# Patient Record
Sex: FEMALE | Race: WHITE | NOT HISPANIC OR LATINO | Employment: FULL TIME | URBAN - METROPOLITAN AREA
[De-identification: names, ages, dates, MRNs, and addresses within clinical notes are randomized per-mention and may not be internally consistent; named-entity substitution may affect disease eponyms.]

---

## 2017-07-05 ENCOUNTER — ALLSCRIPTS OFFICE VISIT (OUTPATIENT)
Dept: OTHER | Facility: OTHER | Age: 27
End: 2017-07-05

## 2017-07-07 LAB — PAP (HISTORICAL): NORMAL

## 2017-11-14 ENCOUNTER — ALLSCRIPTS OFFICE VISIT (OUTPATIENT)
Dept: OTHER | Facility: OTHER | Age: 27
End: 2017-11-14

## 2017-11-14 DIAGNOSIS — N92.6 IRREGULAR MENSTRUATION: ICD-10-CM

## 2017-11-21 ENCOUNTER — LAB CONVERSION - ENCOUNTER (OUTPATIENT)
Dept: OTHER | Facility: OTHER | Age: 27
End: 2017-11-21

## 2017-11-21 LAB
ESTRADIOL LEVEL (HISTORICAL): 93 PG/ML
PROGESTERONE LEVEL (HISTORICAL): 7.9 NG/ML

## 2017-11-21 NOTE — PROGRESS NOTES
Assessment  1  Irregular menses (626 4) (N92 6)    Plan  Irregular menses    · (1) ANTI-MULLERIAN HORMONE; Status:Active; Requested DOS:[id number];    Perform:[facility]; SZR:[id number]; Ordered; For:Irregular menses; Ordered By:Missy Townsend;   · (1) CBC/PLT/DIFF; Status:Active; Requested OBO:[id number];    Perform:[facility]; SFC:[id number]; Ordered; For:Irregular menses; Ordered By:Missy Townsend;   · (1) ESTRADIOL; Status:Active; Requested NIX:[id number];    Perform:[facility]; BUA:[id number]; Ordered; For:Irregular menses; Ordered By:Missy Townsend;   · (1) ESTROGEN; Status:Active; Requested MFE:[id number];    Perform:[facility]; IBI:[id number]; Ordered; For:Irregular menses; Ordered By:Missy Townsend;   · (1) [address]; Status:Active; Requested KPE:[id number];    Perform:[facility]; MRL:[id number]; Ordered; For:Irregular menses; Ordered By:Missy Townsend;   · (1) LH (LEUTINIZING HORMONE); Status:Active; Requested CIK:[id number];    Perform:[facility]; YHC:[id number]; Ordered; For:Irregular menses; Ordered By:Missy Townsend;   · (1) PROGESTERONE; Status:Active; Requested GQP:[id number];    Perform:[facility]; DQC:[id number]; Ordered; For:Irregular menses; Ordered By:Missy Townsend;   · (1) PROGESTERONE; Status:Active; Requested CDR:[id number];    Perform:[facility]; NPF:[id number]; Ordered; For:Irregular menses; Ordered By:Missy Townsend;   · (1) PROLACTIN; Status:Active; Requested VIX:[id number];    Perform:[facility]; TPB:[id number]; Ordered; For:Irregular menses; Ordered By:Missy Townsend;   · (1) T4, FREE; Status:Active; Requested FSR:[id number];    Perform:[facility]; IRH:[id number]; Ordered; For:Irregular menses; Ordered By:Missy Townsend;   · (1) TSH; Status:Active; Requested BOK:[id number];    Perform:[facility]; [id number]; Ordered;menses; Ordered By:Missy Townsend;     Discussion/Summary  Discussion Summary:   Pt will plan day 21 and day 3 labs to eval due to some flucuations in her cycle  If still no pregnancy by Abdias Dawn of next year will then plan [facility] PNV  Counseling Documentation With Imm: total time of encounter was 20 minutes-- and-- 100 minutes was spent counseling  GYN Discussion and Summary:                    Infertility--  Goals and Barriers: The patient has the current Goals: Pregnancy  The patent has the current Barriers: None  Patient's Capacity to Self-Care: Patient is able to Self-Care  Chief Complaint  Chief Complaint Free Text Note Form: Pt for discussion of fertility      History of Present Illness  HPI: Pt for discussion of pregnancy  Has been trying for pregnancy since  and has not yet been successful  Since birth of child has not been using any BC at all  Periods have been pretty normal and in the last few months seem slightly more irregular, coming closer to q 30-31 days as opposed to q 28 days  Bleeds x 7 days  Having IC regularly using alla to track cycles  Does seem to have signs of ovulation w thicker mucous  Has IC every other day from day 7 through second week have pregnancy w  w last delivery  2 mths to conceive w last pregnancy  Review of Systems   Female ROS: no pelvic pain,-- no pelvic pressure,-- no dysmenorrhea,-- no menorrhagia,-- periods are regular,-- regular length of periods,-- no dysuria,-- no bladder pain,-- urine not cloudy-- and-- no urinary loss of control  Focused-Female:  Constitutional: no fever-- and-- no chills  Cardiovascular: no chest pain-- and-- no palpitations  Respiratory: no shortness of breath  Breasts: no breast pain-- and-- no breast lump  Gastrointestinal: no abdominal pain-- and-- no constipation  Genitourinary: no dysuria  Integumentary: no rashes  Active Problems  1  Obesity (278 00) (E66 9)   2  Visit for routine gyn exam () (Z01 419)    Past Medical History    1   History of 1st trimester screening (V28 89) (Z36 9)   2  History of Abnormal placenta, antepartum (656 73) (O43 109)   3  History of Encounter for fetal anatomic survey (V28 81) (Z36 89)   4  History of Encounter for screening for risk of pre-term labor (V28 82) (Z36 86)   5  History of depression (V11 8) (Z86 59)   6  History of Maternal morbid obesity, antepartum (649 13,278 01) (O99 210,E66 01)   7  History of Obesity complicating pregnancy, childbirth, or puerperium, antepartum (649 13,278 00) (O99 210,E66 9)    Surgical History  1  History of  Section    Family History  Mother    1  No pertinent family history  Father    2  No pertinent family history    Social History   · Feels safe at home   ·    · Never a smoker   · No illicit drug use   · Social alcohol use (Z78 9)    Current Meds   1  No Reported Medications Recorded    Allergies  1  Doxycycline Hyclate CAPS  2  No Known Environmental Allergies   3  No Known Food Allergies    Vitals  Vital Signs    Recorded: [id number] 46:19KK   Systolic 417   Diastolic 80   BP CUFF SIZE Large   Height 6 ft    Weight 290 lb    BMI Calculated 39 33   BSA Calculated 2 49       Physical Exam   Constitutional  General appearance: No acute distress, well appearing and well nourished  Skin  Palpation of skin and subcutaneous tissue: Normal    Psychiatric  Orientation to person, place, and time: Normal    Mood and affect: Normal        Attending Note  Collaborating Physician Note: Collaborating Physician: I discussed the case with the Advanced Practitioner and reviewed the note,-- I supervised the Advanced Practitioner-- and-- I agree with the Advanced Practitioner note        Signatures   Electronically signed by : Anthony Nichols, [facility]; 2017  2:01PM EST                       (Author)    Electronically signed by : ERUM Garcia ; 2017  7:00PM EST                       (Author)

## 2017-11-29 ENCOUNTER — LAB CONVERSION - ENCOUNTER (OUTPATIENT)
Dept: OTHER | Facility: OTHER | Age: 27
End: 2017-11-29

## 2017-11-29 LAB
ESTRADIOL LEVEL (HISTORICAL): 39 PG/ML
LUTEINIZING HORMONE (HISTORICAL): 4.6 MIU/ML

## 2017-12-01 ENCOUNTER — LAB CONVERSION - ENCOUNTER (OUTPATIENT)
Dept: OTHER | Facility: OTHER | Age: 27
End: 2017-12-01

## 2017-12-01 LAB
ANTI-MULLERIAN HORMONE (AMH) (HISTORICAL): 0.48 NG/ML
BASOPHILS # BLD AUTO: 0.5 %
BASOPHILS # BLD AUTO: 42 CELLS/UL (ref 0–200)
DEPRECATED RDW RBC AUTO: 12.7 % (ref 11–15)
EOSINOPHIL # BLD AUTO: 183 CELLS/UL (ref 15–500)
EOSINOPHIL # BLD AUTO: 2.2 %
ESTRADIOL LEVEL (HISTORICAL): 39 PG/ML
ESTROGEN LEVEL (HISTORICAL): 179.2 PG/ML
FSH (HISTORICAL): 9.7 MIU/ML
HCT VFR BLD AUTO: 37.8 % (ref 35–45)
HGB BLD-MCNC: 12.5 G/DL (ref 11.7–15.5)
LUTEINIZING HORMONE (HISTORICAL): 4.6 MIU/ML
LYMPHOCYTES # BLD AUTO: 2473 CELLS/UL (ref 850–3900)
LYMPHOCYTES # BLD AUTO: 29.8 %
MCH RBC QN AUTO: 28.6 PG (ref 27–33)
MCHC RBC AUTO-ENTMCNC: 33.1 G/DL (ref 32–36)
MCV RBC AUTO: 86.5 FL (ref 80–100)
MONOCYTES # BLD AUTO: 523 CELLS/UL (ref 200–950)
MONOCYTES (HISTORICAL): 6.3 %
NEUTROPHILS # BLD AUTO: 5080 CELLS/UL (ref 1500–7800)
NEUTROPHILS # BLD AUTO: 61.2 %
PLATELET # BLD AUTO: 311 THOUSAND/UL (ref 140–400)
PMV BLD AUTO: 11.6 FL (ref 7.5–12.5)
RBC # BLD AUTO: 4.37 MILLION/UL (ref 3.8–5.1)
WBC # BLD AUTO: 8.3 THOUSAND/UL (ref 3.8–10.8)

## 2018-01-12 VITALS
SYSTOLIC BLOOD PRESSURE: 118 MMHG | DIASTOLIC BLOOD PRESSURE: 80 MMHG | HEIGHT: 72 IN | WEIGHT: 290 LBS | BODY MASS INDEX: 39.28 KG/M2

## 2018-01-14 VITALS
SYSTOLIC BLOOD PRESSURE: 130 MMHG | DIASTOLIC BLOOD PRESSURE: 72 MMHG | BODY MASS INDEX: 39.55 KG/M2 | HEIGHT: 72 IN | WEIGHT: 292 LBS

## 2018-03-21 ENCOUNTER — TELEPHONE (OUTPATIENT)
Dept: OBGYN CLINIC | Facility: CLINIC | Age: 28
End: 2018-03-21

## 2018-04-09 ENCOUNTER — TELEPHONE (OUTPATIENT)
Dept: OBGYN CLINIC | Facility: CLINIC | Age: 28
End: 2018-04-09

## 2018-12-11 ENCOUNTER — ANNUAL EXAM (OUTPATIENT)
Dept: OBGYN CLINIC | Facility: CLINIC | Age: 28
End: 2018-12-11
Payer: COMMERCIAL

## 2018-12-11 VITALS
DIASTOLIC BLOOD PRESSURE: 80 MMHG | WEIGHT: 276 LBS | BODY MASS INDEX: 37.38 KG/M2 | SYSTOLIC BLOOD PRESSURE: 130 MMHG | HEIGHT: 72 IN

## 2018-12-11 DIAGNOSIS — Z01.419 ENCOUNTER FOR ANNUAL ROUTINE GYNECOLOGICAL EXAMINATION: Primary | ICD-10-CM

## 2018-12-11 PROCEDURE — 99395 PREV VISIT EST AGE 18-39: CPT | Performed by: OBSTETRICS & GYNECOLOGY

## 2018-12-11 NOTE — PROGRESS NOTES
Assessment/Plan   Diagnoses and all orders for this visit:    Encounter for annual routine gynecological examination   No pap done since normal pap last year    Discussion    Reviewed with patient normal exam today  Reviewed monthly SBEs  Encourage safe sexual practices; STD testing done today  Contraception  - pt attempting pregnancy - discussed starting PNV  All questions have been answered to her satisfaction  RTO for annul or sooner if needed    Subjective     Patient is here for her annual exam   She is doing well without complaints  She is having Q monthly menses for 28 days lasting 7 days with normal flow  Her last Pap smear was in 2017 which was normal   She is currently attempting pregnancy and not using anything for birth control  She is in a monogamous relationship and declines any STD testing  Review of Systems   Constitutional: Negative for activity change and appetite change  Gastrointestinal: Negative for abdominal pain  Genitourinary: Negative for pelvic pain  All other systems reviewed and are negative  OB History      Para Term  AB Living    1 1            SAB TAB Ectopic Multiple Live Births                       Past Medical History:   Diagnosis Date    Abnormal placenta, antepartum     resolved 17    Depression     Maternal morbid obesity, antepartum ([address] )     resolved 22 obeity complicating pregnancy, child birth or puerperium, antepartum       Past Surgical History:   Procedure Laterality Date     SECTION  2015       Family History   Problem Relation Age of Onset    No Known Problems Mother     No Known Problems Father        Social History     Social History    Marital status: /Civil Union     Spouse name: N/A    Number of children: N/A    Years of education: N/A     Occupational History    Not on file       Social History Main Topics    Smoking status: Never Smoker    Smokeless tobacco: Never Used    Alcohol use Yes      Comment: social    Drug use: No    Sexual activity: Not on file     Other Topics Concern    Not on file     Social History Narrative    Feels safe at home           No current outpatient prescriptions on file  Allergies   Allergen Reactions    Doxycycline        Objective   Vitals:    12/11/18 1604   BP: 130/80   Weight: 125 kg (276 lb)   Height: 6' (1 829 m)     Physical Exam   Constitutional: She is oriented to person, place, and time  She appears well-developed and well-nourished  HENT:   Head: Normocephalic and atraumatic  Neck: Normal range of motion  Neck supple  Cardiovascular: Normal rate, regular rhythm and normal heart sounds  No murmur heard  Pulmonary/Chest: Effort normal and breath sounds normal  No respiratory distress  She has no wheezes  Right breast exhibits no inverted nipple, no mass, no nipple discharge, no skin change and no tenderness  Left breast exhibits no inverted nipple, no mass, no nipple discharge, no skin change and no tenderness  Breasts are symmetrical    Abdominal: Soft  Bowel sounds are normal  She exhibits no distension  There is no tenderness  There is no rebound and no guarding  Genitourinary: Vagina normal  There is no rash on the right labia  There is no rash on the left labia  Uterus is not enlarged and not tender  Cervix exhibits no motion tenderness and no discharge  Right adnexum displays no mass, no tenderness and no fullness  Left adnexum displays no mass, no tenderness and no fullness  No vaginal discharge found  Musculoskeletal: Normal range of motion  Neurological: She is alert and oriented to person, place, and time  She has normal reflexes  Skin: Skin is warm and dry  Psychiatric: She has a normal mood and affect   Her behavior is normal  Judgment and thought content normal

## 2018-12-15 LAB — THIN PREP CVX: NORMAL

## 2019-01-12 ENCOUNTER — OFFICE VISIT (OUTPATIENT)
Dept: OBGYN CLINIC | Facility: CLINIC | Age: 29
End: 2019-01-12

## 2019-01-12 VITALS
BODY MASS INDEX: 36.84 KG/M2 | SYSTOLIC BLOOD PRESSURE: 122 MMHG | DIASTOLIC BLOOD PRESSURE: 80 MMHG | HEIGHT: 72 IN | WEIGHT: 272 LBS

## 2019-01-12 DIAGNOSIS — R87.615 ENCOUNTER FOR REPEAT PAPANICOLAOU SMEAR OF CERVIX DUE TO PREVIOUS UNSATISFACTORY RESULTS: Primary | ICD-10-CM

## 2019-01-12 PROCEDURE — REPAP: Performed by: PHYSICIAN ASSISTANT

## 2019-01-12 RX ORDER — ASCORBIC ACID, CHOLECALCIFEROL, .ALPHA.-TOCOPHEROL ACETATE, DL-, PYRIDOXINE, FOLIC ACID, CYANOCOBALAMIN, CALCIUM, FERROUS FUMARATE, MAGNESIUM, DOCONEXENT 85; 200; 10; 25; 1; 12; 140; 27; 45; 300 [IU]/1; [IU]/1; [IU]/1; [IU]/1; MG/1; UG/1; MG/1; MG/1; MG/1; MG/1
CAPSULE, GELATIN COATED ORAL
COMMUNITY

## 2019-01-12 NOTE — PROGRESS NOTES
Assessment/Plan   Diagnoses and all orders for this visit:    Encounter for repeat Papanicolaou smear of cervix due to previous unsatisfactory results  -     GP PAP (RFLX HPV PLUS WHEN ASCUS)    Discussion  RTO for APE in 1 year or sooner if needed    Subjective     HPI   Jaxson Ziegler is a 33 y o  female who presents for a repap no charge  Patient had an APE with 18 - at that time it was documented between the patient and provider that a pap was not indicated, however, a pap results came back unsatisfactory since it was likely not done at APE  When called to review, patient desired pap to be done due to history of HRHPV so appointment scheduled  LMP - ; Periods are reg q 28 days and last 7 days; Last Pap - 17 - WNL  History of abnormal Pap smear: yes h/o HRHPV    Review of Systems    The following portions of the patient's history were reviewed and updated as appropriate: allergies, current medications, past family history, past medical history, past social history, past surgical history and problem list          OB History      Para Term  AB Living    1 1            SAB TAB Ectopic Multiple Live Births                       Past Medical History:   Diagnosis Date    Abnormal placenta, antepartum     resolved 17    Depression     Maternal morbid obesity, antepartum ([address] )     resolved  obeity complicating pregnancy, child birth or puerperium, antepartum       Past Surgical History:   Procedure Laterality Date     SECTION  2015       Family History   Problem Relation Age of Onset    No Known Problems Mother     No Known Problems Father        Social History     Social History    Marital status: /Civil Union     Spouse name: N/A    Number of children: N/A    Years of education: N/A     Occupational History    Not on file       Social History Main Topics    Smoking status: Never Smoker    Smokeless tobacco: Never Used    Alcohol use Yes Comment: social    Drug use: No    Sexual activity: Not on file     Other Topics Concern    Not on file     Social History Narrative    Feels safe at home             Current Outpatient Prescriptions:     Prenat w/o Y-UT-Xssvlis-FA-DHA (PNV-DHA) 27-0 6-0 4-300 MG CAPS, Take by mouth, Disp: , Rfl:     Allergies   Allergen Reactions    Doxycycline        Objective   Vitals:    01/12/19 1119   BP: 122/80   BP Location: Left arm   Patient Position: Sitting   Cuff Size: Large   Weight: 123 kg (272 lb)   Height: 6' (1 829 m)     Physical Exam   Constitutional: She appears well-developed and well-nourished  No distress  Genitourinary: Vagina normal  There is no rash, tenderness or lesion on the right labia  There is no rash, tenderness or lesion on the left labia  Cervix exhibits no discharge and no friability  No erythema, tenderness or bleeding in the vagina  No foreign body in the vagina  No vaginal discharge found  Skin: She is not diaphoretic  Psychiatric: She has a normal mood and affect  Her behavior is normal    Vitals reviewed

## 2019-01-16 LAB — THIN PREP CVX: NORMAL

## 2019-02-16 ENCOUNTER — OFFICE VISIT (OUTPATIENT)
Dept: URGENT CARE | Facility: CLINIC | Age: 29
End: 2019-02-16
Payer: COMMERCIAL

## 2019-02-16 VITALS
HEART RATE: 85 BPM | TEMPERATURE: 98.9 F | DIASTOLIC BLOOD PRESSURE: 79 MMHG | SYSTOLIC BLOOD PRESSURE: 134 MMHG | WEIGHT: 277 LBS | RESPIRATION RATE: 16 BRPM | HEIGHT: 72 IN | BODY MASS INDEX: 37.52 KG/M2

## 2019-02-16 DIAGNOSIS — J02.0 STREP PHARYNGITIS: Primary | ICD-10-CM

## 2019-02-16 LAB — S PYO AG THROAT QL: NEGATIVE

## 2019-02-16 PROCEDURE — 87070 CULTURE OTHR SPECIMN AEROBIC: CPT | Performed by: PHYSICIAN ASSISTANT

## 2019-02-16 PROCEDURE — G0382 LEV 3 HOSP TYPE B ED VISIT: HCPCS | Performed by: PHYSICIAN ASSISTANT

## 2019-02-16 RX ORDER — AMOXICILLIN 500 MG/1
500 TABLET, FILM COATED ORAL 2 TIMES DAILY
Qty: 20 TABLET | Refills: 0 | Status: SHIPPED | OUTPATIENT
Start: 2019-02-16 | End: 2019-02-26

## 2019-02-16 NOTE — PROGRESS NOTES
[facility]        NAME: Trey Hightower is a 26 y o  female  : 1990    MRN: [id number]  DATE: 2019  TIME: 3:36 PM    Assessment and Plan   Strep pharyngitis [J02 0]  1  Strep pharyngitis  POCT rapid strepA    amoxicillin (AMOXIL) 500 MG tablet         Patient Instructions   Rapid strep performed in office-  Will send specimen for cultures  Prescription sent to the pharmacy for amoxicillin-use as directed  History and physical exam findings consistent with strep pharyngitis  Saltwater gargles a saline nasal spray, cool mist humidifier, Tylenol/ibuprofen as needed for fever or pain  Follow up with PCP in 3-5 days  Proceed to  ER if symptoms worsen  Chief Complaint     Chief Complaint   Patient presents with    Sore Throat     started  2 days ago, laryngitis, PND         History of Present Illness   The patient is 79-year-old female who presents with a sore throat for approximately 3-4 days  Positive sore throat with difficulty swallowing  Negative lip or tongue swelling  Positive nasal and sinus congestion without sinus pressure pain  Negative cough  Negative shortness of breath or wheezing  Low-grade fever without chills  Negative abdominal pain, nausea, vomiting or diarrhea  Negative myalgias  HPI    Review of Systems   Review of Systems   Constitutional: Positive for fever  Negative for activity change, chills and fatigue  HENT: Positive for congestion, sinus pressure and sore throat  Negative for ear discharge, ear pain, facial swelling, mouth sores, postnasal drip, rhinorrhea, sinus pain, sneezing and trouble swallowing  Eyes: Negative for pain, discharge, redness and itching  Respiratory: Negative for apnea, cough, chest tightness, shortness of breath, wheezing and stridor  Cardiovascular: Negative for chest pain  Gastrointestinal: Negative for abdominal distention, abdominal pain, diarrhea, nausea and vomiting     Genitourinary: Negative for difficulty urinating  Musculoskeletal: Negative for arthralgias and myalgias  Skin: Negative for pallor and rash  Allergic/Immunologic: Negative  Neurological: Negative for dizziness, light-headedness and headaches  Hematological: Negative  Psychiatric/Behavioral: Negative  All other systems reviewed and are negative  Current Medications       Current Outpatient Medications:     Prenat w/o W-HE-Ybjvygr-FA-DHA (PNV-DHA) 27-0 6-0 4-300 MG CAPS, Take by mouth, Disp: , Rfl:     amoxicillin (AMOXIL) 500 MG tablet, Take 1 tablet (500 mg total) by mouth 2 (two) times a day for 10 days, Disp: 20 tablet, Rfl: 0    Current Allergies     Allergies as of 2019 - Reviewed 2019   Allergen Reaction Noted    Doxycycline  2015            The following portions of the patient's history were reviewed and updated as appropriate: allergies, current medications, past family history, past medical history, past social history, past surgical history and problem list      Past Medical History:   Diagnosis Date    Abnormal placenta, antepartum     resolved 17    Depression     Maternal morbid obesity, antepartum [address] [address]     resolved  obeity complicating pregnancy, child birth or puerperium, antepartum       Past Surgical History:   Procedure Laterality Date     SECTION  2015       Family History   Problem Relation Age of Onset    No Known Problems Mother     No Known Problems Father          Medications have been verified  Objective   /79 (Patient Position: Sitting)   Pulse 85   Temp 98 9 °F (37 2 °C) (Temporal)   Resp 16   Ht 6' (1 829 m)   Wt 126 kg (277 lb)   BMI 37 57 kg/m²        Physical Exam     Physical Exam   Constitutional: She is oriented to person, place, and time  She appears well-developed and well-nourished  She appears ill  No distress  HENT:   Head: Normocephalic and atraumatic     Right Ear: Hearing, tympanic membrane, external ear and ear canal normal  No drainage or tenderness  Tympanic membrane is not perforated, not erythematous, not retracted and not bulging  No middle ear effusion  No decreased hearing is noted  Left Ear: Hearing, tympanic membrane, external ear and ear canal normal  No drainage or tenderness  Tympanic membrane is not perforated, not erythematous, not retracted and not bulging  No middle ear effusion  No decreased hearing is noted  Nose: Rhinorrhea present  No mucosal edema or septal deviation  Right sinus exhibits no maxillary sinus tenderness and no frontal sinus tenderness  Left sinus exhibits no maxillary sinus tenderness and no frontal sinus tenderness  Mouth/Throat: Uvula is midline and mucous membranes are normal  No oral lesions  No dental abscesses or uvula swelling  Oropharyngeal exudate, posterior oropharyngeal edema and posterior oropharyngeal erythema present  No tonsillar abscesses  Eyes: Pupils are equal, round, and reactive to light  Conjunctivae and EOM are normal    Neck: Trachea normal, normal range of motion and full passive range of motion without pain  Neck supple  No JVD present  No edema and no erythema present  No thyromegaly present  Cardiovascular: Normal rate, regular rhythm, S1 normal, S2 normal, normal heart sounds, intact distal pulses and normal pulses  No murmur heard  Pulmonary/Chest: Effort normal and breath sounds normal  No accessory muscle usage or stridor  No respiratory distress  She has no decreased breath sounds  She has no wheezes  Abdominal: Soft  Normal appearance and bowel sounds are normal  She exhibits no distension  There is no hepatosplenomegaly  There is no tenderness  Musculoskeletal: Normal range of motion  She exhibits no edema  Neurological: She is alert and oriented to person, place, and time  Skin: Skin is warm, dry and intact  No abrasion, no lesion and no rash noted  She is not diaphoretic  No cyanosis or erythema  Nails show no clubbing  Psychiatric: She has a normal mood and affect  Her speech is normal and behavior is normal    Nursing note and vitals reviewed

## 2019-02-16 NOTE — PATIENT INSTRUCTIONS
Rapid strep performed in office-  Will send specimen for cultures  Prescription sent to the pharmacy for amoxicillin-use as directed  History and physical exam findings consistent with strep pharyngitis  Saltwater gargles a saline nasal spray, cool mist humidifier, Tylenol/ibuprofen as needed for fever or pain  Follow up with PCP in 3-5 days  Proceed to  ER if symptoms worsen  Strep Throat   AMBULATORY CARE:   Strep throat  is a throat infection caused by bacteria  It is easily spread from person to person  Common symptoms include the following:   · Sore, red, and swollen throat    · Fever and headache     · Upset stomach, abdominal pain, or vomiting    · White or yellow patches or blisters in the back of your throat    · Tender, swollen lumps on the sides of your neck or jaw    · Throat pain when you swallow  Call 911 for any of the following:   · You have trouble breathing  Seek care immediately if:   · You have new symptoms like a bad headache, stiff neck, chest pain, or vomiting  · You are drooling because you cannot swallow your spit  Contact your healthcare provider if:   · You have a fever  · You have a rash or ear pain  · You have green, yellow-brown, or bloody mucus when you cough or blow your nose  · You are unable to drink anything  · You have questions or concerns about your condition or care  Treatment for strep throat  may include antibiotic medicine to treat your strep throat  You should feel better within 2 to 3 days after you start antibiotics  You may return to work or school 24 hours after you start antibiotics  Manage strep throat:   · Use lozenges, ice, soft foods, or popsicles  to soothe your throat  · Drink juice, milk shakes, or soup  if your throat is too sore to eat solid food  Drinking liquids can also help prevent dehydration  · Gargle with salt water  Mix ¼ teaspoon salt in a glass of warm water and gargle   This may help reduce swelling in your throat  · Do not smoke  Nicotine and other chemicals in cigarettes and cigars can cause lung damage and make your symptoms worse  Ask your healthcare provider for information if you currently smoke and need help to quit  E-cigarettes or smokeless tobacco still contain nicotine  Talk to your healthcare provider before you use these products  Prevent the spread of strep throat:   · Wash your hands often  Use soap and water  Wash your hands after you use the bathroom, change a child's diapers, or sneeze  Wash your hands before you prepare or eat food  · Do not share food or drinks  Replace your toothbrush after you have taken antibiotics for 24 hours  Follow up with your healthcare provider as directed:  Write down your questions so you remember to ask them during your visits  © 2017 [address] [employer] Information is for End User's use only and may not be sold, redistributed or otherwise used for commercial purposes  All illustrations and images included in CareNotes® are the copyrighted property of [employer]  or Adithya Cordero  The above information is an  only  It is not intended as medical advice for individual conditions or treatments  Talk to your doctor, nurse or pharmacist before following any medical regimen to see if it is safe and effective for you

## 2019-02-18 LAB — BACTERIA THROAT CULT: NORMAL

## 2019-05-08 ENCOUNTER — TELEPHONE (OUTPATIENT)
Dept: OBGYN CLINIC | Facility: CLINIC | Age: 29
End: 2019-05-08

## 2019-05-30 ENCOUNTER — TELEPHONE (OUTPATIENT)
Dept: OBGYN CLINIC | Facility: CLINIC | Age: 29
End: 2019-05-30

## 2019-06-07 ENCOUNTER — INITIAL PRENATAL (OUTPATIENT)
Dept: OBGYN CLINIC | Facility: CLINIC | Age: 29
End: 2019-06-07

## 2019-06-07 VITALS
WEIGHT: 291 LBS | BODY MASS INDEX: 39.42 KG/M2 | SYSTOLIC BLOOD PRESSURE: 128 MMHG | DIASTOLIC BLOOD PRESSURE: 72 MMHG | HEIGHT: 72 IN

## 2019-06-07 DIAGNOSIS — Z34.81 PRENATAL CARE, SUBSEQUENT PREGNANCY, FIRST TRIMESTER: Primary | ICD-10-CM

## 2019-06-07 PROCEDURE — PNV: Performed by: PHYSICIAN ASSISTANT

## 2019-06-07 RX ORDER — MULTIVIT-MIN/IRON/FOLIC ACID/K 18-600-40
CAPSULE ORAL
COMMUNITY

## 2019-06-28 DIAGNOSIS — Z3A.11 11 WEEKS GESTATION OF PREGNANCY: Primary | ICD-10-CM

## 2019-06-30 LAB — GLUCOSE 1H P 50 G GLC PO SERPL-MCNC: 96 MG/DL

## 2019-07-08 ENCOUNTER — ROUTINE PRENATAL (OUTPATIENT)
Dept: OBGYN CLINIC | Facility: CLINIC | Age: 29
End: 2019-07-08

## 2019-07-08 VITALS — SYSTOLIC BLOOD PRESSURE: 112 MMHG | DIASTOLIC BLOOD PRESSURE: 68 MMHG | BODY MASS INDEX: 39.3 KG/M2 | WEIGHT: 289.8 LBS

## 2019-07-08 DIAGNOSIS — Z3A.12 PREGNANCY WITH 12 COMPLETED WEEKS GESTATION: Primary | ICD-10-CM

## 2019-07-08 PROCEDURE — PNV: Performed by: OBSTETRICS & GYNECOLOGY

## 2019-07-08 NOTE — PROGRESS NOTES
Patient reports no fm, no n/v, headache, cramping, bleeding, loss of fluid, edema, dom violence, or smoking  sun pnv has pnc Wednesday, rubella and varicella immune, those are only labs seen from fertility, will order ob panel without those  Return in 4 weeks or sooner as needed

## 2019-07-09 ENCOUNTER — DOCUMENTATION (OUTPATIENT)
Dept: PERINATAL CARE | Facility: CLINIC | Age: 29
End: 2019-07-09

## 2019-07-10 ENCOUNTER — ROUTINE PRENATAL (OUTPATIENT)
Dept: PERINATAL CARE | Facility: CLINIC | Age: 29
End: 2019-07-10
Payer: COMMERCIAL

## 2019-07-10 VITALS
HEIGHT: 72 IN | BODY MASS INDEX: 39.68 KG/M2 | HEART RATE: 93 BPM | SYSTOLIC BLOOD PRESSURE: 134 MMHG | WEIGHT: 293 LBS | DIASTOLIC BLOOD PRESSURE: 86 MMHG

## 2019-07-10 DIAGNOSIS — O34.211 MATERNAL CARE DUE TO LOW TRANSVERSE UTERINE SCAR FROM PREVIOUS CESAREAN DELIVERY: Primary | ICD-10-CM

## 2019-07-10 DIAGNOSIS — Z34.81 PRENATAL CARE, SUBSEQUENT PREGNANCY, FIRST TRIMESTER: ICD-10-CM

## 2019-07-10 DIAGNOSIS — Z3A.13 13 WEEKS GESTATION OF PREGNANCY: ICD-10-CM

## 2019-07-10 DIAGNOSIS — Z36.82 ENCOUNTER FOR NUCHAL TRANSLUCENCY TESTING: ICD-10-CM

## 2019-07-10 DIAGNOSIS — O99.210 OBESITY AFFECTING PREGNANCY, ANTEPARTUM: ICD-10-CM

## 2019-07-10 PROCEDURE — 76813 OB US NUCHAL MEAS 1 GEST: CPT | Performed by: OBSTETRICS & GYNECOLOGY

## 2019-07-10 PROCEDURE — 76817 TRANSVAGINAL US OBSTETRIC: CPT | Performed by: OBSTETRICS & GYNECOLOGY

## 2019-07-10 PROCEDURE — 99201 PR OFFICE OUTPATIENT NEW 10 MINUTES: CPT | Performed by: OBSTETRICS & GYNECOLOGY

## 2019-07-10 NOTE — PROGRESS NOTES
A transvaginal ultrasound was performed  Sonographer note on use of High Level Disinfection Process (Trophon) for transvaginal probe# 20used, serial [id number]    Laurel Randolph RDMS

## 2019-07-12 ENCOUNTER — ROUTINE PRENATAL (OUTPATIENT)
Dept: OBGYN CLINIC | Facility: CLINIC | Age: 29
End: 2019-07-12

## 2019-07-12 ENCOUNTER — HOSPITAL ENCOUNTER (EMERGENCY)
Facility: HOSPITAL | Age: 29
Discharge: HOME | End: 2019-07-12
Attending: EMERGENCY MEDICINE | Admitting: EMERGENCY MEDICINE
Payer: COMMERCIAL

## 2019-07-12 VITALS
BODY MASS INDEX: 38.74 KG/M2 | WEIGHT: 286 LBS | OXYGEN SATURATION: 97 % | RESPIRATION RATE: 18 BRPM | TEMPERATURE: 97.6 F | HEART RATE: 100 BPM | DIASTOLIC BLOOD PRESSURE: 84 MMHG | SYSTOLIC BLOOD PRESSURE: 162 MMHG | HEIGHT: 72 IN

## 2019-07-12 VITALS — SYSTOLIC BLOOD PRESSURE: 128 MMHG | BODY MASS INDEX: 39.33 KG/M2 | WEIGHT: 290 LBS | DIASTOLIC BLOOD PRESSURE: 64 MMHG

## 2019-07-12 DIAGNOSIS — Z34.81 PRENATAL CARE, SUBSEQUENT PREGNANCY, FIRST TRIMESTER: Primary | ICD-10-CM

## 2019-07-12 DIAGNOSIS — O46.90 VAGINAL BLEEDING DURING PREGNANCY: Primary | ICD-10-CM

## 2019-07-12 LAB
BACTERIA UR QL AUTO: ABNORMAL /HPF
BILIRUB UR QL STRIP: NEGATIVE
CLARITY UR: CLEAR
COLOR UR: YELLOW
COLOR, POC: NORMAL
GLUCOSE UR STRIP-MCNC: NEGATIVE MG/DL
HGB UR QL STRIP.AUTO: ABNORMAL
KETONES UR STRIP-MCNC: NEGATIVE MG/DL
LEUKOCYTE ESTERASE UR QL STRIP: NEGATIVE
NITRITE UR QL STRIP: NEGATIVE
NON-SQ EPI CELLS URNS QL MICRO: ABNORMAL /HPF
PH UR STRIP.AUTO: 7 [PH] (ref 4.5–8)
PROT UR STRIP-MCNC: NEGATIVE MG/DL
RBC #/AREA URNS AUTO: ABNORMAL /HPF
SP GR UR STRIP.AUTO: 1.01 (ref 1–1.03)
UROBILINOGEN UR QL STRIP.AUTO: 0.2 E.U./DL
WBC #/AREA URNS AUTO: ABNORMAL /HPF

## 2019-07-12 PROCEDURE — 87086 URINE CULTURE/COLONY COUNT: CPT

## 2019-07-12 PROCEDURE — 99283 EMERGENCY DEPT VISIT LOW MDM: CPT

## 2019-07-12 PROCEDURE — 87077 CULTURE AEROBIC IDENTIFY: CPT

## 2019-07-12 PROCEDURE — PNV: Performed by: PHYSICIAN ASSISTANT

## 2019-07-12 PROCEDURE — 99283 EMERGENCY DEPT VISIT LOW MDM: CPT | Performed by: EMERGENCY MEDICINE

## 2019-07-12 PROCEDURE — 87186 SC STD MICRODIL/AGAR DIL: CPT

## 2019-07-12 PROCEDURE — 81001 URINALYSIS AUTO W/SCOPE: CPT

## 2019-07-12 NOTE — PROGRESS NOTES
Problem Visit: Patient seen follow up from ER this AM  Reports this morning noticed bright red bleeding when she wiped that was continuing  Went to ER where continued to have small amount of bleeding  Reports FHR was 127 in ED  Patient concerned with how low heart rate is  Reports mild cramping  Patient had ultrasound at [facility] on Tuesday which revealed no signs of [facility]  Did state they had difficulty visualizing and had to do transvaginal ultrasound  No FM, N/V, HA, LOF, edema, domestic violence, or smoking  Tolerating PNV  Speculum exam: Small amount of brown discharge in vaginal vault  No active bleeding at cervix, cervix closed  FHR: 131, Good fetal movement seen on ultrasound  Reassured patient normal -160  Reviewed with patient bleeding likely secondary to transvaginal ultrasound  Continue to monitor call office if continues or becomes bright red again  Continue routine prenatal care  Return to office for scheduled ob check

## 2019-07-12 NOTE — ED ATTENDING ATTESTATION
Vira Wade MD, saw and evaluated the patient  All available labs and X-rays were ordered by me or the resident and have been reviewed by myself  I discussed the patient with the resident / non-physician and agree with the resident's / non-physician practitioner's findings and plan as documented in the resident's / non-physician practicitioner's note, except where noted  At this point, I agree with the current assessment done in the ED  I was present during key portions of all procedures performed unless otherwise stated  Chief Complaint   Patient presents with    Vaginal Bleeding - Pregnant     13 weeks pregnant, experiencing spotting, no clots or cramping      this is a 76-year-old female who is  at approximately 13 weeks and 3 days gestation  She states that she was doing okay until today when she started to have spotting  Denies any fevers chills nausea vomiting chest pain shortness of breath urinary symptoms including burning itching pain blood frequency  Denies diarrhea or constipation  No sick contacts  No complications during the pregnancy so far  She has had a normal ultrasound as well in the past   She has an IUP already confirm  Denies AV lifting  No blood thinners  She is O-positive  PE:  Vitals:    19 0439   BP: 162/84   BP Location: Right arm   Pulse: 100   Resp: 18   Temp: 97 6 °F (36 4 °C)   TempSrc: Oral   SpO2: 97%   Weight: 130 kg (286 lb)   Height: 6' (1 829 m)   General: VSS, NAD, awake, alert  Well-nourished, well-developed  Appears stated age  Speaking normally in full sentences  Head: Normocephalic, atraumatic, nontender  Eyes: PERRL, EOM-I  No diplopia  No hyphema  No subconjunctival hemorrhages  Symmetrical lids  ENT: Atraumatic external nose and ears  MMM  No malocclusion  No stridor  Normal phonation  No drooling  Normal swallowing  Neck: Symmetric, trachea midline  No JVD    CV: RRR  +S1/S2  No murmurs or gallops  Peripheral pulses +2 throughout  No chest wall tenderness  Lungs:   Unlabored No retractions  CTAB, lungs sounds equal bilateral    No tachypnea  Abd: +BS, soft, NT/ND    MSK:   FROM   Back:   No rashes  Skin: Dry, intact  Neuro: AAOx3, GCS 15, CN II-XII grossly intact  Motor grossly intact  Psychiatric/Behavioral: Appropriate mood and affect   Exam: deferred  A:  - Vaginal bleeding  P:  -  I did a bedside ultrasound with the resident  There is an IUP present  Active fetal movement  Gestational heart rate approximately 128  Will check a urine for infection  Likely discharge home if negative  Reassurance  Pelvic rest   Follow up Ob  - 13 point ROS was performed and all are normal unless stated in the history above  - Nursing note reviewed  Vitals reviewed  - Orders placed by myself and/or advanced practitioner / resident     - Previous chart was reviewed  - No language barrier    - History obtained from patient  - There are no limitations to the history obtained  - Critical care time: Not applicable for this patient  Addendum:  - patient seen by OBGYN resident at her insistence  - OBGYN in agreement with my workup + plan + RTER precautions  - DC-ed without any problems  - patient refused pelvic exam      Final Diagnosis:  1   Vaginal bleeding during pregnancy           Medications - No data to display  No orders to display     Orders Placed This Encounter   Procedures    Urine culture    Urine Microscopic    POCT urinalysis dipstick     Labs Reviewed   ED URINE MACROSCOPIC - Abnormal       Result Value Ref Range Status    Color, UA Yellow   Final    Clarity, UA Clear   Final    pH, UA 7 0  4 5 - 8 0 Final    Leukocytes, UA Negative  Negative Final    Nitrite, UA Negative  Negative Final    Protein, UA Negative  Negative mg/dl Final    Glucose, UA Negative  Negative mg/dl Final    Ketones, UA Negative  Negative mg/dl Final    Urobilinogen, UA 0 2  0 2, 1 0 E U /dl E U /dl Final    Bilirubin, UA Negative Negative Final    Blood, UA Large (*) Negative Final    Specific [place], UA 1 015  1 003 - 1 030 Final    Narrative:     CLINITEK RESULT   POCT URINALYSIS DIPSTICK - Normal    Color, UA See chart   Final   URINE CULTURE   URINE MICROSCOPIC     Time reflects when diagnosis was documented in both MDM as applicable and the Disposition within this note     Time User Action Codes Description Comment    7/12/2019  5:59 AM Moris Sanon Add [O46 90] Vaginal bleeding during pregnancy       ED Disposition     ED Disposition Condition Date/Time Comment    Discharge Good Fri Jul 12, 2019  5:59 AM Girish Lieevelyn discharge to home/self care  Follow-up Information     Follow up With Specialties Details Why Contact Info Additional Information    David Cat MD Obstetrics and Gynecology, Obstetrics, Gynecology Call  For [facility] follow-up as scheduled [facility] 99 [id number]       [address] [place] [address] Emergency Department Emergency Medicine Go to  If symptoms worsen [address]  [phone]  ED, [address] [place] [address], [phone]        Patient's Medications   Discharge Prescriptions    No medications on file     No discharge procedures on file  Prior to Admission Medications   Prescriptions Last Dose Informant Patient Reported? Taking? Cholecalciferol (VITAMIN D) 2000 units CAPS 7/11/2019 at Unknown time Self Yes Yes   Sig: Take by mouth   Prenat w/o G-OU-Emvvrhj-FA-DHA (PNV-DHA) 27-0 6-0 4-300 MG CAPS 7/11/2019 at Unknown time Self Yes Yes   Sig: Take by mouth      Facility-Administered Medications: None       Portions of the record may have been created with voice recognition software  Occasional wrong word or "sound a like" substitutions may have occurred due to the inherent limitations of voice recognition software  Read the chart carefully and recognize, using context, where substitutions have occurred      Electronically signed by:  Ayden Garcias

## 2019-07-12 NOTE — ED PROVIDER NOTES
History  Chief Complaint   Patient presents with    Vaginal Bleeding - Pregnant     13 weeks pregnant, experiencing spotting, no clots or cramping     HPI   This is a 59-year-old woman  who is 13 weeks pregnant and presents to the emergency department for vaginal bleeding  Patient has had a small amount of vaginal spotting beginning tonight  She does not have any amanda blood accumulating on her pads, but has noticed quarter-sized spots of blood on toilet paper with wiping  She is not having any abdominal or pelvic pain  She already has a documented intrauterine pregnancy by ultrasound  She has no fever, dysuria, increased urinary frequency  No dizziness or lightheadedness  There were no complications with her previous pregnancy other than an unplanned   She has had 2 other episodes of similar spotting during this pregnancy but otherwise no complications  She is blood type O-positive  Prior to Admission Medications   Prescriptions Last Dose Informant Patient Reported? Taking? Cholecalciferol (VITAMIN D) 2000 units CAPS 2019 at Unknown time Self Yes Yes   Sig: Take by mouth   Prenat w/o K-NQ-Hsxrxud-FA-DHA (PNV-DHA) 27-0 6-0 4-300 MG CAPS 2019 at Unknown time Self Yes Yes   Sig: Take by mouth      Facility-Administered Medications: None       Past Medical History:   Diagnosis Date    Abnormal placenta, antepartum     resolved 17    Depression     Maternal morbid obesity, antepartum ([address] )     resolved 8/3/65 obeity complicating pregnancy, child birth or puerperium, antepartum    Varicella     varicella vaccine       Past Surgical History:   Procedure Laterality Date     SECTION  2015       Family History   Problem Relation Age of Onset    No Known Problems Mother     Hypertension Father     Celiac disease Sister      I have reviewed and agree with the history as documented      Social History     Tobacco Use    Smoking status: Never Smoker    Smokeless tobacco: Never Used   Substance Use Topics    Alcohol use: Not Currently     Comment: social    Drug use: No        Review of Systems   Constitutional: Negative for chills and fever  Respiratory: Negative for shortness of breath  Cardiovascular: Negative for chest pain  Gastrointestinal: Negative for abdominal pain, nausea and vomiting  Genitourinary: Positive for vaginal bleeding  Negative for dysuria, pelvic pain, vaginal discharge and vaginal pain  Neurological: Negative for dizziness and light-headedness  All other systems reviewed and are negative  Physical Exam  ED Triage Vitals   Temperature Pulse Respirations Blood Pressure SpO2   07/12/19 0439 07/12/19 0439 07/12/19 0439 07/12/19 0439 07/12/19 0439   97 6 °F (36 4 °C) 100 18 162/84 97 %      Temp Source Heart Rate Source Patient Position - Orthostatic VS BP Location FiO2 (%)   07/12/19 0439 07/12/19 0439 07/12/19 0439 07/12/19 0439 --   Oral Monitor Lying Right arm       Pain Score       07/12/19 0440       No Pain             Orthostatic Vital Signs  Vitals:    07/12/19 0439   BP: 162/84   Pulse: 100   Patient Position - Orthostatic VS: Lying       Physical Exam   Constitutional: She is oriented to person, place, and time  She appears well-developed and well-nourished  No distress  HENT:   Head: Normocephalic and atraumatic  Eyes: Pupils are equal, round, and reactive to light  Conjunctivae and EOM are normal  No scleral icterus  Neck: Normal range of motion  Neck supple  Cardiovascular: Normal rate and regular rhythm  Exam reveals no gallop and no friction rub  No murmur heard  Pulmonary/Chest: Breath sounds normal  She has no wheezes  She has no rales  Abdominal: Soft  She exhibits no distension  There is no tenderness  There is no rebound and no guarding  Musculoskeletal: Normal range of motion  She exhibits no edema or tenderness  Lymphadenopathy:     She has no cervical adenopathy     Neurological: She is alert and oriented to person, place, and time  No cranial nerve deficit or sensory deficit  She exhibits normal muscle tone  Skin: Skin is warm and dry  She is not diaphoretic  No erythema  No pallor  Psychiatric: She has a normal mood and affect  Her behavior is normal    Nursing note and vitals reviewed  Offered patient pelvic exam, but declined    ED Medications  Medications - No data to display    Diagnostic Studies  Results Reviewed     Procedure Component Value Units Date/Time    Urine Microscopic [[id number]] Collected:  07/12/19 0522    Lab Status: In process Specimen:  Urine, Clean Catch Updated:  07/12/19 0525    Urine culture [[id number]] Collected:  07/12/19 0522    Lab Status:   In process Specimen:  Urine, Clean Catch Updated:  07/12/19 0525    POCT urinalysis dipstick [[id number]]  (Normal) Resulted:  07/12/19 0518    Lab Status:  Final result Updated:  07/12/19 0523     Color, UA See chart    ED Urine Macroscopic [[id number]]  (Abnormal) Collected:  07/12/19 0522    Lab Status:  Final result Specimen:  Urine Updated:  07/12/19 0518     Color, UA Yellow     Clarity, UA Clear     pH, UA 7 0     Leukocytes, UA Negative     Nitrite, UA Negative     Protein, UA Negative mg/dl      Glucose, UA Negative mg/dl      Ketones, UA Negative mg/dl      Urobilinogen, UA 0 2 E U /dl      Bilirubin, UA Negative     Blood, UA Large     Specific [place], UA 1 015    Narrative:       CLINITEK RESULT                 No orders to display         Procedures  Procedures        ED Course         MDM  Number of Diagnoses or Management Options  Vaginal bleeding during pregnancy: new and requires workup     Amount and/or Complexity of Data Reviewed  Discussion of test results with the performing providers: yes  Decide to obtain previous medical records or to obtain history from someone other than the patient: yes  Obtain history from someone other than the patient: yes  Review and summarize past medical records: yes  Discuss the patient with other providers: yes  Independent visualization of images, tracings, or specimens: yes    Patient Progress  Patient progress: resolved     57-year-old woman presents for vaginal spotting 13 weeks pregnant  Patient has normal vital signs, no significant findings on physical exam   Bedside ultrasound demonstrates intrauterine pregnancy with positive fetal movement and heart rate approximately 130  Will check urine to rule out infection  Patient was discussed with and evaluated by the OB resident Dr Corey Andres for discharge  Return precautions  Follow up with Ob as scheduled  Disposition  Final diagnoses:   Vaginal bleeding during pregnancy     Time reflects when diagnosis was documented in both MDM as applicable and the Disposition within this note     Time User Action Codes Description Comment    7/12/2019  5:59 AM Regulo Guerra Add [O46 90] Vaginal bleeding during pregnancy       ED Disposition     ED Disposition Condition Date/Time Comment    Discharge Good Fri Jul 12, 2019  5:59 AM Ziggy aSms discharge to home/self care  Follow-up Information     Follow up With Specialties Details Why Contact Info Additional Information    Ceefrino Rodriguez MD Obstetrics and Gynecology, Obstetrics, Gynecology Call  For [facility] follow-up as scheduled [address] [place] [phone]       [address] [place] [address] Emergency Department Emergency Medicine Go to  If symptoms worsen [address]  [phone]  ED, [address] [place] [address], [phone]          Patient's Medications   Discharge Prescriptions    No medications on file     No discharge procedures on file  ED Provider  Attending physically available and evaluated Ziggy Sams I managed the patient along with the ED Attending      Electronically Signed by         Gianni Schmitt MD  07/12/19 7007

## 2019-07-15 LAB
BACTERIA UR CULT: ABNORMAL

## 2019-07-18 ENCOUNTER — TELEPHONE (OUTPATIENT)
Dept: OBGYN CLINIC | Facility: CLINIC | Age: 29
End: 2019-07-18

## 2019-07-18 ENCOUNTER — TELEPHONE (OUTPATIENT)
Dept: PERINATAL CARE | Facility: CLINIC | Age: 29
End: 2019-07-18

## 2019-07-18 ENCOUNTER — HOSPITAL ENCOUNTER (EMERGENCY)
Facility: HOSPITAL | Age: 29
Discharge: HOME | End: 2019-07-18
Attending: EMERGENCY MEDICINE | Admitting: EMERGENCY MEDICINE
Payer: COMMERCIAL

## 2019-07-18 VITALS
DIASTOLIC BLOOD PRESSURE: 83 MMHG | OXYGEN SATURATION: 100 % | RESPIRATION RATE: 18 BRPM | SYSTOLIC BLOOD PRESSURE: 135 MMHG | TEMPERATURE: 98.9 F | WEIGHT: 293 LBS | HEART RATE: 83 BPM | BODY MASS INDEX: 40.25 KG/M2

## 2019-07-18 DIAGNOSIS — R82.90 URINE ABNORMALITY: Primary | ICD-10-CM

## 2019-07-18 LAB
ALBUMIN SERPL BCP-MCNC: 3.8 G/DL (ref 3.5–5)
ALP SERPL-CCNC: 46 U/L (ref 46–116)
ALT SERPL W P-5'-P-CCNC: 17 U/L (ref 12–78)
ANION GAP SERPL CALCULATED.3IONS-SCNC: 5 MMOL/L (ref 4–13)
AST SERPL W P-5'-P-CCNC: 10 U/L (ref 5–45)
BACTERIA UR QL AUTO: ABNORMAL /HPF
BASOPHILS # BLD AUTO: 0.03 THOUSANDS/ΜL (ref 0–0.1)
BASOPHILS NFR BLD AUTO: 0 % (ref 0–1)
BILIRUB SERPL-MCNC: 0.48 MG/DL (ref 0.2–1)
BILIRUB UR QL STRIP: NEGATIVE
BUN SERPL-MCNC: 6 MG/DL (ref 5–25)
CALCIUM SERPL-MCNC: 8.9 MG/DL (ref 8.3–10.1)
CHLORIDE SERPL-SCNC: 106 MMOL/L (ref 100–108)
CLARITY UR: CLEAR
CO2 SERPL-SCNC: 24 MMOL/L (ref 21–32)
COLOR UR: YELLOW
COLOR, POC: NORMAL
CREAT SERPL-MCNC: 0.48 MG/DL (ref 0.6–1.3)
EOSINOPHIL # BLD AUTO: 0.16 THOUSAND/ΜL (ref 0–0.61)
EOSINOPHIL NFR BLD AUTO: 2 % (ref 0–6)
ERYTHROCYTE [DISTWIDTH] IN BLOOD BY AUTOMATED COUNT: 12.3 % (ref 11.6–15.1)
GFR SERPL CREATININE-BSD FRML MDRD: 132 ML/MIN/1.73SQ M
GLUCOSE SERPL-MCNC: 84 MG/DL (ref 65–140)
GLUCOSE UR STRIP-MCNC: NEGATIVE MG/DL
HCT VFR BLD AUTO: 37.6 % (ref 34.8–46.1)
HGB BLD-MCNC: 13 G/DL (ref 11.5–15.4)
HGB UR QL STRIP.AUTO: ABNORMAL
HYALINE CASTS #/AREA URNS LPF: ABNORMAL /LPF
IMM GRANULOCYTES # BLD AUTO: 0.05 THOUSAND/UL (ref 0–0.2)
IMM GRANULOCYTES NFR BLD AUTO: 1 % (ref 0–2)
KETONES UR STRIP-MCNC: NEGATIVE MG/DL
LEUKOCYTE ESTERASE UR QL STRIP: NEGATIVE
LYMPHOCYTES # BLD AUTO: 2.11 THOUSANDS/ΜL (ref 0.6–4.47)
LYMPHOCYTES NFR BLD AUTO: 25 % (ref 14–44)
MCH RBC QN AUTO: 29.6 PG (ref 26.8–34.3)
MCHC RBC AUTO-ENTMCNC: 34.6 G/DL (ref 31.4–37.4)
MCV RBC AUTO: 86 FL (ref 82–98)
MONOCYTES # BLD AUTO: 0.59 THOUSAND/ΜL (ref 0.17–1.22)
MONOCYTES NFR BLD AUTO: 7 % (ref 4–12)
NEUTROPHILS # BLD AUTO: 5.41 THOUSANDS/ΜL (ref 1.85–7.62)
NEUTS SEG NFR BLD AUTO: 65 % (ref 43–75)
NITRITE UR QL STRIP: NEGATIVE
NON-SQ EPI CELLS URNS QL MICRO: ABNORMAL /HPF
NRBC BLD AUTO-RTO: 0 /100 WBCS
PH UR STRIP.AUTO: 7 [PH] (ref 4.5–8)
PLATELET # BLD AUTO: 243 THOUSANDS/UL (ref 149–390)
PMV BLD AUTO: 10.8 FL (ref 8.9–12.7)
POTASSIUM SERPL-SCNC: 3.4 MMOL/L (ref 3.5–5.3)
PROT SERPL-MCNC: 7.5 G/DL (ref 6.4–8.2)
PROT UR STRIP-MCNC: NEGATIVE MG/DL
RBC # BLD AUTO: 4.39 MILLION/UL (ref 3.81–5.12)
RBC #/AREA URNS AUTO: ABNORMAL /HPF
SODIUM SERPL-SCNC: 135 MMOL/L (ref 136–145)
SP GR UR STRIP.AUTO: 1.01 (ref 1–1.03)
UROBILINOGEN UR QL STRIP.AUTO: 0.2 E.U./DL
WBC # BLD AUTO: 8.35 THOUSAND/UL (ref 4.31–10.16)
WBC #/AREA URNS AUTO: ABNORMAL /HPF

## 2019-07-18 PROCEDURE — 36415 COLL VENOUS BLD VENIPUNCTURE: CPT | Performed by: EMERGENCY MEDICINE

## 2019-07-18 PROCEDURE — 87181 SC STD AGAR DILUTION PER AGT: CPT | Performed by: EMERGENCY MEDICINE

## 2019-07-18 PROCEDURE — 87186 SC STD MICRODIL/AGAR DIL: CPT | Performed by: EMERGENCY MEDICINE

## 2019-07-18 PROCEDURE — 87086 URINE CULTURE/COLONY COUNT: CPT | Performed by: EMERGENCY MEDICINE

## 2019-07-18 PROCEDURE — 85025 COMPLETE CBC W/AUTO DIFF WBC: CPT | Performed by: EMERGENCY MEDICINE

## 2019-07-18 PROCEDURE — 99283 EMERGENCY DEPT VISIT LOW MDM: CPT

## 2019-07-18 PROCEDURE — 81001 URINALYSIS AUTO W/SCOPE: CPT

## 2019-07-18 PROCEDURE — 87077 CULTURE AEROBIC IDENTIFY: CPT | Performed by: EMERGENCY MEDICINE

## 2019-07-18 PROCEDURE — 99283 EMERGENCY DEPT VISIT LOW MDM: CPT | Performed by: EMERGENCY MEDICINE

## 2019-07-18 PROCEDURE — 80053 COMPREHEN METABOLIC PANEL: CPT | Performed by: EMERGENCY MEDICINE

## 2019-07-18 NOTE — TELEPHONE ENCOUNTER
----- Message from Arabella Dudley RN sent at 7/17/2019 11:50 AM EDT -----  Regarding: FW: Non-Urgent Medical Question  Contact: [phone]      ----- Message -----  From: Disha Parisi  Sent: 7/17/2019  10:44 AM EDT  To: [address] Clinical  Subject: Non-Urgent Medical Question                      I have a question about EXTERNAL ORDER PANEL resulted on 7/16/19, 10:11 AM     What is the website to find the test results

## 2019-07-18 NOTE — TELEPHONE ENCOUNTER
Spoke with pt concerning her question about results on my chart   Pt receptive and verbalizes understanding

## 2019-07-18 NOTE — DISCHARGE INSTRUCTIONS
Return to the emergency department if you develop any fevers chills burning upon urination or any new or worrisome symptoms  Please call your OBGYN for follow-up next week

## 2019-07-18 NOTE — TELEPHONE ENCOUNTER
Spoke with pt and provided her with the results of the part 1 Sequential Screen  Part 2 explained, pt receptive and declines questions  TRF mailed

## 2019-07-18 NOTE — TELEPHONE ENCOUNTER
Pt called crying, was contacted by hospital, has pseudomonas in urine  No medication available that is safe in pregnancy outside of hospital  Hospital would like Gladys Gimenez to return to have outpatient treatment, in hospital  Pt just wanted to be reassured this is correct treatment, I spoke with Yohan Anna, that this bacteria will not harm baby and its common to have treated this way, to follow hospital instruction and return to register in ER  Patient feels better knowing this and will call us with update

## 2019-07-18 NOTE — RESULT ENCOUNTER NOTE
Called patient and left message  Jhonatan advise pt to return as she is pregnant and has pseudomonas in urine  No outpatient antibiotics available to treat with while pregnant

## 2019-07-18 NOTE — TELEPHONE ENCOUNTER
----- Message from Adrianne Pugh MD sent at 2019 11:00 AM EDT -----  Vahe Urias  RN staff, I've reviewed this Sequential Part 1 result which is normal, can you call her regarding this result? Thank you    Adrianne Pugh MD

## 2019-07-18 NOTE — LETTER
07/18/19  Geradine Riser  1990    Thank you for completing Part 1 of your Sequential Screen  To obtain a complete test result, please complete blood work for Part 2 Sequential Screen between the weeks of 7/29/2019 to 8/12/2019  Based on your insurance coverage, please use one of the following locations  Call our office for any questions at [phone]      [address]   [address] [facility], [facility], [address]    Phone: [address] [facility] [address]  [address] [facility], [address]    Phone: [address] [place], [address] [facility]  Phone: [phone] [facility] [facility], [address]   Phone: [phone] (*ask for lab)    [address]  [address] [facility], [facility], [address] [place] [address]  Phone: [phone]  Hours: Monday-Friday 6a-6p, Saturday 7a-12p    [address] [facility],[address]  [address]  [facility] [address], [address] [facility]; [place], [address]   Phone: [address]  [address] [facility] [address]   Phone: [phone]    Sincerely,    Quyen Henriquez RN

## 2019-07-18 NOTE — RESULT ENCOUNTER NOTE
Pt returned call  Advised to return to ED for treatment for pseudomonas in urine while 14 weeks pregnant

## 2019-07-19 ENCOUNTER — TELEPHONE (OUTPATIENT)
Dept: OBGYN CLINIC | Facility: CLINIC | Age: 29
End: 2019-07-19

## 2019-07-19 ENCOUNTER — HOSPITAL ENCOUNTER (INPATIENT)
Facility: HOSPITAL | Age: 29
LOS: 5 days | Discharge: HOME | DRG: 833 | End: 2019-07-24
Attending: OBSTETRICS & GYNECOLOGY | Admitting: OBSTETRICS & GYNECOLOGY
Payer: COMMERCIAL

## 2019-07-19 DIAGNOSIS — N39.0 PSEUDOMONAS URINARY TRACT INFECTION: Primary | ICD-10-CM

## 2019-07-19 DIAGNOSIS — B96.5 PSEUDOMONAS URINARY TRACT INFECTION: Primary | ICD-10-CM

## 2019-07-19 DIAGNOSIS — Z3A.13 13 WEEKS GESTATION OF PREGNANCY: ICD-10-CM

## 2019-07-19 DIAGNOSIS — N30.01 ACUTE CYSTITIS WITH HEMATURIA: Primary | ICD-10-CM

## 2019-07-19 PROBLEM — Z3A.14 14 WEEKS GESTATION OF PREGNANCY: Status: ACTIVE | Noted: 2019-07-10

## 2019-07-19 LAB
BASOPHILS # BLD AUTO: 0.04 THOUSANDS/ΜL (ref 0–0.1)
BASOPHILS NFR BLD AUTO: 0 % (ref 0–1)
EOSINOPHIL # BLD AUTO: 0.19 THOUSAND/ΜL (ref 0–0.61)
EOSINOPHIL NFR BLD AUTO: 2 % (ref 0–6)
ERYTHROCYTE [DISTWIDTH] IN BLOOD BY AUTOMATED COUNT: 12.3 % (ref 11.6–15.1)
HCT VFR BLD AUTO: 33.8 % (ref 34.8–46.1)
HGB BLD-MCNC: 11.5 G/DL (ref 11.5–15.4)
IMM GRANULOCYTES # BLD AUTO: 0.06 THOUSAND/UL (ref 0–0.2)
IMM GRANULOCYTES NFR BLD AUTO: 1 % (ref 0–2)
LYMPHOCYTES # BLD AUTO: 2.51 THOUSANDS/ΜL (ref 0.6–4.47)
LYMPHOCYTES NFR BLD AUTO: 27 % (ref 14–44)
MCH RBC QN AUTO: 29.3 PG (ref 26.8–34.3)
MCHC RBC AUTO-ENTMCNC: 34 G/DL (ref 31.4–37.4)
MCV RBC AUTO: 86 FL (ref 82–98)
MONOCYTES # BLD AUTO: 0.66 THOUSAND/ΜL (ref 0.17–1.22)
MONOCYTES NFR BLD AUTO: 7 % (ref 4–12)
NEUTROPHILS # BLD AUTO: 5.79 THOUSANDS/ΜL (ref 1.85–7.62)
NEUTS SEG NFR BLD AUTO: 63 % (ref 43–75)
NRBC BLD AUTO-RTO: 0 /100 WBCS
PLATELET # BLD AUTO: 215 THOUSANDS/UL (ref 149–390)
PMV BLD AUTO: 11 FL (ref 8.9–12.7)
RBC # BLD AUTO: 3.93 MILLION/UL (ref 3.81–5.12)
WBC # BLD AUTO: 9.25 THOUSAND/UL (ref 4.31–10.16)

## 2019-07-19 PROCEDURE — 85025 COMPLETE CBC W/AUTO DIFF WBC: CPT | Performed by: OBSTETRICS & GYNECOLOGY

## 2019-07-19 PROCEDURE — 99218 PR INITIAL OBSERVATION CARE/DAY 30 MINUTES: CPT | Performed by: OBSTETRICS & GYNECOLOGY

## 2019-07-19 RX ORDER — ACETAMINOPHEN 325 MG/1
650 TABLET ORAL EVERY 6 HOURS PRN
Status: DISCONTINUED | OUTPATIENT
Start: 2019-07-19 | End: 2019-07-24 | Stop reason: HOSPADM

## 2019-07-19 RX ORDER — SODIUM CHLORIDE, SODIUM LACTATE, POTASSIUM CHLORIDE, CALCIUM CHLORIDE 600; 310; 30; 20 MG/100ML; MG/100ML; MG/100ML; MG/100ML
125 INJECTION, SOLUTION INTRAVENOUS CONTINUOUS
Status: DISCONTINUED | OUTPATIENT
Start: 2019-07-19 | End: 2019-07-20

## 2019-07-19 RX ORDER — MELATONIN
2000 DAILY
Status: DISCONTINUED | OUTPATIENT
Start: 2019-07-20 | End: 2019-07-24 | Stop reason: HOSPADM

## 2019-07-19 RX ADMIN — SODIUM CHLORIDE, SODIUM LACTATE, POTASSIUM CHLORIDE, AND CALCIUM CHLORIDE 125 ML/HR: .6; .31; .03; .02 INJECTION, SOLUTION INTRAVENOUS at 18:03

## 2019-07-19 RX ADMIN — CEFEPIME HYDROCHLORIDE 2000 MG: 2 INJECTION, POWDER, FOR SOLUTION INTRAVENOUS at 20:16

## 2019-07-19 NOTE — TELEPHONE ENCOUNTER
Pt's mother called to update us on daughter  ER doctor is going to incubate bacteria prior to treating  ER dr is going to update patient on Monday, if bacteria level is higher than 5000, they will keep her over night and treat her intravenously

## 2019-07-19 NOTE — ED PROVIDER NOTES
History  Chief Complaint   Patient presents with    Possible UTI     pt was intructed to come to ED for treatment of urine infection during pregnancy       26year old female 14 weeks p/w abnormal urine culture  Was seen 5 days ago for vb which abated  Urinalysis showed + pseudomonas/proteus  Asymptomatic but because pregnancy brought back in to treat  Denies f/c dysuria  Maybe increased frequency but probably more related to pregnancy  No other c/o  Denies n/v/d  A/P:  Bacteruria  Discussed with OB (brii) and ID (Toby) recommend because 39 cfu of pseudomonas and proteus and asymptomatic, and mixed contaminatts recommend recheck clean catch and if positive brign back to treat  Patient agreeable to plan  Return precautions for sx of UTI  Prior to Admission Medications   Prescriptions Last Dose Informant Patient Reported? Taking? Cholecalciferol (VITAMIN D) 2000 units CAPS 2019 at Unknown time Self Yes Yes   Sig: Take by mouth   Prenat w/o A-IT-Llxauqs-FA-DHA (PNV-DHA) 27-0 6-0 4-300 MG CAPS 2019 at Unknown time Self Yes Yes   Sig: Take by mouth      Facility-Administered Medications: None       Past Medical History:   Diagnosis Date    Abnormal placenta, antepartum     resolved 17    Depression     Maternal morbid obesity, antepartum ([address] )     resolved 19 obeity complicating pregnancy, child birth or puerperium, antepartum    Varicella     varicella vaccine       Past Surgical History:   Procedure Laterality Date     SECTION  2015       Family History   Problem Relation Age of Onset    No Known Problems Mother     Hypertension Father     Celiac disease Sister      I have reviewed and agree with the history as documented      Social History     Tobacco Use    Smoking status: Never Smoker    Smokeless tobacco: Never Used   Substance Use Topics    Alcohol use: Not Currently     Comment: social    Drug use: No        Review of Systems    Physical Exam  Physical Exam   Constitutional: She is oriented to person, place, and time  She appears well-developed and well-nourished  No distress  HENT:   Head: Normocephalic and atraumatic  Nose: Nose normal    Eyes: Pupils are equal, round, and reactive to light  Conjunctivae and EOM are normal  No scleral icterus  Neck: Normal range of motion  Neck supple  No JVD present  No tracheal deviation present  No thyromegaly present  Cardiovascular: Normal rate, regular rhythm, normal heart sounds and intact distal pulses  Exam reveals no gallop and no friction rub  Pulmonary/Chest: Effort normal and breath sounds normal  No respiratory distress  She has no wheezes  She has no rales  She exhibits no tenderness  Abdominal: Soft  Bowel sounds are normal  She exhibits no distension and no mass  There is no tenderness  There is no rebound and no guarding  No hernia  Musculoskeletal: Normal range of motion  She exhibits no edema, tenderness or deformity  Neurological: She is alert and oriented to person, place, and time  She has normal reflexes  No cranial nerve deficit  Coordination normal    Skin: Skin is warm and dry  She is not diaphoretic  No erythema  Psychiatric: She has a normal mood and affect  Her behavior is normal    Nursing note and vitals reviewed        Vital Signs  ED Triage Vitals   Temperature Pulse Respirations Blood Pressure SpO2   07/18/19 1121 07/18/19 1113 07/18/19 1113 07/18/19 1113 07/18/19 1113   98 9 °F (37 2 °C) 83 18 135/83 100 %      Temp Source Heart Rate Source Patient Position - Orthostatic VS BP Location FiO2 (%)   07/18/19 1121 07/18/19 1113 07/18/19 1113 07/18/19 1113 --   Oral Monitor Sitting Left arm       Pain Score       07/18/19 1113       No Pain           Vitals:    07/18/19 1113   BP: 135/83   Pulse: 83   Patient Position - Orthostatic VS: Sitting         Visual Acuity      ED Medications  Medications - No data to display    Diagnostic Studies  Results Reviewed Procedure Component Value Units Date/Time    Urine culture [[id number]]  (Abnormal) Collected:  07/18/19 1256    Lab Status:  Preliminary result Specimen:  Urine, Clean Catch Updated:  07/19/19 1355     Urine Culture 80,000-89,000 cfu/ml Pseudomonas aeruginosa      <10,000 cfu/ml     Urine Microscopic [[id number]]  (Abnormal) Collected:  07/18/19 1256    Lab Status:  Final result Specimen:  Urine, Clean Catch Updated:  07/18/19 1320     RBC, UA 2-4 /hpf      WBC, UA None Seen /hpf      Epithelial Cells None Seen /hpf      Bacteria, UA Occasional /hpf      Hyaline Casts, UA None Seen /lpf     POCT urinalysis dipstick [[id number]]  (Normal) Resulted:  07/18/19 1251    Lab Status:  Final result Specimen:  Urine Updated:  07/18/19 1257     Color, UA -    ED Urine Macroscopic [[id number]]  (Abnormal) Collected:  07/18/19 1256    Lab Status:  Final result Specimen:  Urine Updated:  07/18/19 1251     Color, UA Yellow     Clarity, UA Clear     pH, UA 7 0     Leukocytes, UA Negative     Nitrite, UA Negative     Protein, UA Negative mg/dl      Glucose, UA Negative mg/dl      Ketones, UA Negative mg/dl      Urobilinogen, UA 0 2 E U /dl      Bilirubin, UA Negative     Blood, UA Small     Specific Tina, UA 1 010    Narrative:       CLINITEK RESULT    Comprehensive metabolic panel [[id number]]  (Abnormal) Collected:  07/18/19 1140    Lab Status:  Final result Specimen:  Blood from Arm, Right Updated:  07/18/19 1209     Sodium 135 mmol/L      Potassium 3 4 mmol/L      Chloride 106 mmol/L      CO2 24 mmol/L      ANION GAP 5 mmol/L      BUN 6 mg/dL      Creatinine 0 48 mg/dL      Glucose 84 mg/dL      Calcium 8 9 mg/dL      AST 10 U/L      ALT 17 U/L      Alkaline Phosphatase 46 U/L      Total Protein 7 5 g/dL      Albumin 3 8 g/dL      Total Bilirubin 0 48 mg/dL      eGFR 132 ml/min/1 73sq m     Narrative:       [facility] guidelines for Chronic Kidney Disease (CKD):     Stage 1 with normal or high GFR (GFR > 90 mL/min/1 73 square meters)    Stage 2 Mild CKD (GFR = 60-89 mL/min/1 73 square meters)    Stage 3A Moderate CKD (GFR = 45-59 mL/min/1 73 square meters)    Stage 3B Moderate CKD (GFR = 30-44 mL/min/1 73 square meters)    Stage 4 Severe CKD (GFR = 15-29 mL/min/1 73 square meters)    Stage 5 End Stage CKD (GFR <15 mL/min/1 73 square meters)  Note: GFR calculation is accurate only with a steady state creatinine    CBC and differential [[id number]] Collected:  07/18/19 1140    Lab Status:  Final result Specimen:  Blood from Arm, Right Updated:  07/18/19 1154     WBC 8 35 Thousand/uL      RBC 4 39 Million/uL      Hemoglobin 13 0 g/dL      Hematocrit 37 6 %      MCV 86 fL      MCH 29 6 pg      MCHC 34 6 g/dL      RDW 12 3 %      MPV 10 8 fL      Platelets 013 Thousands/uL      nRBC 0 /100 WBCs      Neutrophils Relative 65 %      Immat GRANS % 1 %      Lymphocytes Relative 25 %      Monocytes Relative 7 %      Eosinophils Relative 2 %      Basophils Relative 0 %      Neutrophils Absolute 5 41 Thousands/µL      Immature Grans Absolute 0 05 Thousand/uL      Lymphocytes Absolute 2 11 Thousands/µL      Monocytes Absolute 0 59 Thousand/µL      Eosinophils Absolute 0 16 Thousand/µL      Basophils Absolute 0 03 Thousands/µL                  No orders to display              Procedures  Procedures       ED Course                               MDM  Number of Diagnoses or Management Options  Urine abnormality: new and requires workup     Amount and/or Complexity of Data Reviewed  Clinical lab tests: reviewed and ordered  Tests in the medicine section of CPT®: reviewed and ordered  Discuss the patient with other providers: yes        Disposition  Final diagnoses:   Urine abnormality     Time reflects when diagnosis was documented in both MDM as applicable and the Disposition within this note     Time User Action Codes Description Comment    7/18/2019 12:42 PM Brian Guzman Add [R82 90] Urine abnormality       ED Disposition ED Disposition Condition Date/Time Comment    Discharge Stable Thu Jul 18, 2019 12:42 PM Olya Paula discharge to home/self care  Follow-up Information    None         Discharge Medication List as of 7/18/2019 12:44 PM      CONTINUE these medications which have NOT CHANGED    Details   Cholecalciferol (VITAMIN D) 2000 units CAPS Take by mouth, Historical Med      Prenat w/o S-CQ-Njtjfow-FA-DHA (PNV-DHA) 27-0 6-0 4-300 MG CAPS Take by mouth, Historical Med           No discharge procedures on file      ED Provider  Electronically Signed by           Carlos Heard DO  07/19/19 4654

## 2019-07-19 NOTE — TELEPHONE ENCOUNTER
Patient went to ER yesterday  [facility] er called and have bacteria in urine  They want her to go to the ER for treatment  Patient wanted to make us aware

## 2019-07-19 NOTE — H&P
History & Physical - OB/GYN   Lola Pay 26 y o  female MRN: [id number]  Unit/Bed#: [facility] 915-01 Encounter: [id number]    19 y o   at 14w2d weeks by LMP  She is a patient of Bushra Moody and Qi    Chief complaint:  I'm upset that I'm here    HPI:  Contractions:  no  Fetal movement:  yes  Vaginal bleeding:   no  Leaking of fluid:  no    Deryl Beck is a 31yo  at 14w2d who presents today for inpatient treatment of a Pseudomonas urinary tract infection  Pt was evaluated for vaginal spotting in the emergency department on 19  At that time, a urine culture was drawn that revealed 30-39K colonies of Pseudomonas aeruginosa, as well as 70-79K colonies of Proteus mirabilis  Pt was notified and instructed to return for repeat urine culture  Pt represented on  for repeat urine culture, which was positive for 80-89K colonies of Pseudomonas aeruginosa  Pt was notified of these results, and was instructed to present to [facility] for inpatient antibiotic administration  Currently, pt is tearful but otherwise reports feeling well  She is concerned about antibiotic management, and what that means for her pregnancy  ROS:  She denies fevers, chills, night sweats  Denies CP, SOB, cough, palpitations  Denies nausea, emesis, diarrhea, constipation  She has no obstetrical complaints including no further vaginal bleeding, no cramping or contractions, and no leaking of fluid  Reports good fetal movement despite this early gestational age  No changes in her discharge  Denies changes in her urinary habits  Denies changes in the color or odor of her urine  She does report urgency, but also states that this has been her baseline throughout pregnancy, and that when she does urinate, she voids moderate to large amounts  Denies dysuria, burning, or irritation      Pregnancy Complications:  Patient Active Problem List   Diagnosis    Obesity affecting pregnancy, antepartum    Maternal care due to low transverse uterine scar from previous  delivery    14 weeks gestation of pregnancy    Prenatal care, subsequent pregnancy, first trimester    Pseudomonas urinary tract infection       PMH:  Past Medical History:   Diagnosis Date    Abnormal placenta, antepartum     resolved 17    Depression     Maternal morbid obesity, antepartum ([address] )     resolved 89 obeity complicating pregnancy, child birth or puerperium, antepartum    Varicella     varicella vaccine       PSH:  Past Surgical History:   Procedure Laterality Date     SECTION  2015       Social Hx:  , presents today with her mother    OB Hx:  OB History    Para Term  AB Living   2 1 1 0 0 1   SAB TAB Ectopic Multiple Live Births   0 0 0 0 1      # Outcome Date GA Lbr Chan/2nd Weight Sex Delivery Anes PTL Lv   2 Current            1 Term 09/12/15 39w0d  3090 g (6 lb 13 oz) F CS-LTranv EPI  XAVIER      Complications: Fetal Intolerance      Name: Turon       Meds:  No current facility-administered medications on file prior to encounter  Current Outpatient Medications on File Prior to Encounter   Medication Sig Dispense Refill    Cholecalciferol (VITAMIN D) 2000 units CAPS Take by mouth      Prenat w/o K-OT-Vfxobuw-FA-DHA (PNV-DHA) 27-0 6-0 4-300 MG CAPS Take by mouth         Allergies: Allergies   Allergen Reactions    Doxycycline Hives       Physical Exam:  /90   Pulse 90   Resp 18   Ht 6' (1 829 m)   Wt 136 kg (300 lb)   LMP 04/10/2019   SpO2 99%   BMI 40 69 kg/m²       Gen: AaOx3, NAD but tearful upon arrival, pleasant, cooperative  Card: RRR, no murmurs, rubs, or gallops  Pulm: CTAB, no wheezes, rales, or rhonchi  Good inspiratory effort  Abd: Soft, nontender, nondistended  Obese  : No CVA tenderness     Extremities: No edema, nontender, Negative Sarah's bilaterally    Membranes: Intact    Assessment:   26 y o   at 14w2d weeks, with Pseudomonas urinary tract infection    Plan:   Pseudomonas UTI:  - Discussed briefly with infectious disease; will start Cefepime 2g IV q8hr with plan for 5 days of antibiotic therapy  - ID to see pt tomorrow  - WBC upon arrival 9 25  - Vital signs stable, cont to monitor    IUP at 14w:  - Cont prenatal vitamin    FEN:  - Regular diet  - LR @ 125cc/hr    DVT Ppx:   - SCDs bilaterally  - Encourage ambulation    Discussed with Dr Clarissa Mcadams,   OB/GYN, PGY4  7/19/2019, 9:39 PM

## 2019-07-20 PROCEDURE — 99223 1ST HOSP IP/OBS HIGH 75: CPT | Performed by: INTERNAL MEDICINE

## 2019-07-20 PROCEDURE — 99231 SBSQ HOSP IP/OBS SF/LOW 25: CPT | Performed by: OBSTETRICS & GYNECOLOGY

## 2019-07-20 RX ADMIN — CEFEPIME HYDROCHLORIDE 2000 MG: 2 INJECTION, POWDER, FOR SOLUTION INTRAVENOUS at 10:48

## 2019-07-20 RX ADMIN — VITAMIN D, TAB 1000IU (100/BT) 2000 UNITS: 25 TAB at 09:05

## 2019-07-20 RX ADMIN — CEFEPIME HYDROCHLORIDE 2000 MG: 2 INJECTION, POWDER, FOR SOLUTION INTRAVENOUS at 02:43

## 2019-07-20 RX ADMIN — CEFEPIME HYDROCHLORIDE 2000 MG: 2 INJECTION, POWDER, FOR SOLUTION INTRAVENOUS at 17:56

## 2019-07-20 RX ADMIN — PRENATAL VIT W/ FE FUMARATE-FA TAB 27-0.8 MG 1 TABLET: 27-0.8 TAB at 09:05

## 2019-07-20 RX ADMIN — SODIUM CHLORIDE, SODIUM LACTATE, POTASSIUM CHLORIDE, AND CALCIUM CHLORIDE 125 ML/HR: .6; .31; .03; .02 INJECTION, SOLUTION INTRAVENOUS at 02:43

## 2019-07-20 NOTE — SOCIAL WORK
Initial interview:     CM met with the patient to review the CM role and discuss possible dc needs  Pt lives with her  and their 3 yo Dtr in a 2 story home in [place], [place]  [address]  2nd floor bedroom and bathroom on each level  Pt is independent, works full-time and drives  No DME  No hx of VNA  or IP rehab  Pt denied drug, etoh or mental illness history  No POA or LW  Prescriptions are filled at [facility] in [facility]  Main contact:    Jessenia Mendiola [phone]  Mother Gordon Cheatham [phone], [phone]    DC Plan - home via family  Pt denied any other dc needs  CM reviewed d/c planning process including the following: identifying help at home, patient preference for d/c planning needs, Discharge Lounge, [facility] Meds to Bed program, availability of treatment team to discuss questions or concerns patient and/or family may have regarding understanding medications and recognizing signs and symptoms once discharged  CM also encouraged patient to follow up with all recommended appointments after discharge  Patient advised of importance for patient and family to participate in managing patients medical well being

## 2019-07-20 NOTE — QUICK NOTE
Reviewed with patient urine culture findings  Discussed possible treatment and consult with Infectious disease  Reviewed possible pass to go home for awhile tomorrow for family event  Patient doing well with pregnancy, no further bleeding  Some emotional sensitivity  Vitals:    07/19/19 1600   BP: 145/90   Pulse: 90   Resp: 18   SpO2: 99%     FHT by ultrasound 170  Good fetal movement  Await further information from ID as well as evaluation for any possible alternate or outpatient methods for treatment  Continue current care and iv cefapime

## 2019-07-20 NOTE — CONSULTS
Consultation - Infectious Disease   Carlos Hillman 26 y o  female MRN: [id number]  Unit/Bed#: [facility] 915-01 Encounter: [id number]      IMPRESSION & RECOMMENDATIONS:   1  Asymptomatic bacteriuria in pregnancy with Pseudomonas  Patient presents with repeating cultures positive for Pseudomonas and increasing colony count  She has no symptoms at this time  The patient unfortunately is 14 weeks pregnant and so is at increased risk for progressing upper urinary tract infection this is the presence of bacteriuria  Her vitals are stable and labs are stable  Susceptibilities noted  Continue patient on IV cefepime 2 g q 8 hours  Continue to trend fever curve/vitals  Repeat chemistry tomorrow  Will obtain fosfomycin susceptibilities  Additional care as per primary  Will plan to treat for total of 5 days  Oral antibiotic options limited based on this organism  2  Intrauterine pregnancy at 14 weeks  Patient appears to be doing well otherwise during the course of the 2nd pregnancy  Ongoing follow-up and care as per primary  3  Morbid obesity  Patient noted with BMI of 40  Ongoing care as per primary service  Above plan discussed in detail with the patient  Above plan discussed in detail with resident yesterday  ID consult service will continue to follow  HISTORY OF PRESENT ILLNESS:  Reason for Consult:  Asymptomatic bacteriuria in pregnancy    HPI: Carlos Hillman is a 26y o  year old female the patient is a 79-year-old female who is 14 weeks pregnant who presents for urine cultures that are persistently positive with increasing colony count for Pseudomonas  The patient has no symptoms to report  She denies any dysuria, back pain, nausea, vomiting, chest pain, shortness of breath  She denies any significant lower extremity edema  She denies any further issues during her pregnancy thus far  Patient at baseline is morbidly obese but has no other significant past medical history otherwise    She has had previous Caesarean section  She reports allergy to doxycycline  She has no other prosthetic material in her body  At this time she is currently tolerating cefepime without development of diarrhea or rash  She is tolerating p o  No other acute events were noted overnight on chart review  Patient is currently afebrile without leukocytosis  Urine cultures now with Pseudomonas as the predominant organism  No imaging obtained  Patient's other vitals are stable  No acute findings on labs  Patient has no other new complaints this morning  We are consulted at this time for further assistance in management given this patient's presentation of asymptomatic bacteriuria being 14 weeks pregnant  REVIEW OF SYSTEMS:  A complete 12 point system-based review of systems is negative other than that noted in the HPI      PAST MEDICAL HISTORY:  Past Medical History:   Diagnosis Date    Abnormal placenta, antepartum     resolved 17    Depression     Maternal morbid obesity, antepartum ([address] )     resolved 57 obeity complicating pregnancy, child birth or puerperium, antepartum    Varicella     varicella vaccine     Past Surgical History:   Procedure Laterality Date     SECTION  2015       FAMILY HISTORY:  Non-contributory    SOCIAL HISTORY:  Social History   Social History     Substance and Sexual Activity   Alcohol Use Not Currently    Comment: social     Social History     Substance and Sexual Activity   Drug Use No     Social History     Tobacco Use   Smoking Status Never Smoker   Smokeless Tobacco Never Used       ALLERGIES:  Allergies   Allergen Reactions    Doxycycline Hives       MEDICATIONS:  All current active medications have been reviewed    PHYSICAL EXAM:  Temp:  [98 6 °F (37 °C)] 98 6 °F (37 °C)  HR:  [73-93] 74  Resp:  [18-20] 20  BP: (136-145)/(80-90) 141/83  SpO2:  [98 %-99 %] 98 %  Temp (24hrs), Av 6 °F (37 °C), Min:98 6 °F (37 °C), Max:98 6 °F (37 °C)  Current: Temperature: 98 6 °F (37 °C)    Intake/Output Summary (Last 24 hours) at 7/20/2019 1028  Last data filed at 7/20/2019 0601  Gross per 24 hour   Intake 1591 66 ml   Output 2400 ml   Net -808  34 ml       General Appearance:  Appearing well, nontoxic, and in no distress   Head:  Normocephalic, without obvious abnormality, atraumatic   Eyes:  Conjunctiva pink and sclera anicteric, both eyes   Nose: Nares normal, mucosa normal, no drainage   Throat: Oropharynx moist without lesions   Neck: Supple, symmetrical, no adenopathy, no tenderness/mass/nodules   Back:   Symmetric, no curvature, ROM normal, no CVA tenderness; no spinal or paraspinal muscle tenderness to palpation   Lungs:   Clear to auscultation bilaterally, respirations unlabored   Chest Wall:  No tenderness or deformity   Heart:  RRR; no murmur, rub or gallop   Abdomen:   Soft, non-tender, non-distended, positive bowel sounds; specifically no suprapubic tenderness on exam      Extremities: No cyanosis, clubbing or edema   Skin: No rashes or lesions  No draining wounds noted  Lymph nodes: Cervical, supraclavicular nodes normal   Neurologic: Alert and oriented times 3, extremity strength 5/5 and symmetric       LABS, IMAGING, & OTHER STUDIES:  Lab Results:  I have personally reviewed pertinent labs  Results from last 7 days   Lab Units 07/19/19 2033 07/18/19  1140   WBC Thousand/uL 9 25 8 35   HEMOGLOBIN g/dL 11 5 13 0   PLATELETS Thousands/uL 215 243     Results from last 7 days   Lab Units 07/18/19  1140   POTASSIUM mmol/L 3 4*   CHLORIDE mmol/L 106   CO2 mmol/L 24   BUN mg/dL 6   CREATININE mg/dL 0 48*   EGFR ml/min/1 73sq m 132   CALCIUM mg/dL 8 9   AST U/L 10   ALT U/L 17   ALK PHOS U/L 46     Results from last 7 days   Lab Units 07/18/19  1256   URINE CULTURE  80,000-89,000 cfu/ml Pseudomonas aeruginosa*  <10,000 cfu/ml        Imaging Studies:   I have personally reviewed pertinent imaging study reports and images in PACS        Other Studies:   I have personally reviewed pertinent reports

## 2019-07-20 NOTE — UTILIZATION REVIEW
This is a Notification of Observation Care Status to our facility [place] [address]  Be advised that this patient was admitted to our facility under Observation Status  Please contact the Utilization Review Department where the patient is receiving care services for additional admission information  Place of Service Code: 25  Place of Service Name: [facility]  CPT Code for Observation: CPT Abida Stewart / CPT 02642    Presentation Date & Time: 7/19/2019  3:44 PM  Observation Admission Date & Time:   Discharge Date & Time: No discharge date for patient encounter  Discharge Disposition (if discharged): Home/Self Care  Attending Physician: Taniya Martinez [[id number]]   Attending Physician:  ERUM Martinez  Specialty- Obstetrics and Gynecology  [facility] ID- [id number]  [address] [place] [address], [address] [place] [address]  Phone 1: [phone]  Fax: [phone]  Admission Orders (From admission, onward)    Ordered        07/19/19 1757  Place in Observation  Once             Facility: [address])  Network Utilization Review Department  Phone: [phone]; Fax [phone]  [email or web address]  ATTENTION: Please call with any questions or concerns to [phone]  and carefully listen to the prompts so that you are directed to the right person  Send all requests for admission clinical reviews, approved or denied determinations and any other requests to fax [phone]   All voicemails are confidential

## 2019-07-20 NOTE — PLAN OF CARE
Problem: PAIN - ADULT  Goal: Verbalizes/displays adequate comfort level or baseline comfort level  Description  Interventions:  - Encourage patient to monitor pain and request assistance  - Assess pain using appropriate pain scale  - Administer analgesics based on type and severity of pain and evaluate response  - Implement non-pharmacological measures as appropriate and evaluate response  - Consider cultural and social influences on pain and pain management  - Notify physician/advanced practitioner if interventions unsuccessful or patient reports new pain  Outcome: Progressing     Problem: INFECTION - ADULT  Goal: Absence or prevention of progression during hospitalization  Description  INTERVENTIONS:  - Assess and monitor for signs and symptoms of infection  - Monitor lab/diagnostic results  - Monitor all insertion sites, i e  indwelling lines, tubes, and drains  - Monitor endotracheal (as able) and nasal secretions for changes in amount and color  - [place] appropriate cooling/warming therapies per order  - Administer medications as ordered  - Instruct and encourage patient and family to use good hand hygiene technique  - Identify and instruct in appropriate isolation precautions for identified infection/condition  Outcome: Progressing  Goal: Absence of fever/infection during neutropenic period  Description  INTERVENTIONS:  - Monitor WBC  - Implement neutropenic guidelines  Outcome: Progressing     Problem: SAFETY ADULT  Goal: Patient will remain free of falls  Description  INTERVENTIONS:  - Assess patient frequently for physical needs  -  Identify cognitive and physical deficits and behaviors that affect risk of falls    -  [place] fall precautions as indicated by assessment   - Educate patient/family on patient safety including physical limitations  - Instruct patient to call for assistance with activity based on assessment  - Modify environment to reduce risk of injury  - Consider OT/PT consult to assist with strengthening/mobility  Outcome: Progressing  Goal: Maintain or return to baseline ADL function  Description  INTERVENTIONS:  -  Assess patient's ability to carry out ADLs; assess patient's baseline for ADL function and identify physical deficits which impact ability to perform ADLs (bathing, care of mouth/teeth, toileting, grooming, dressing, etc )  - Assess/evaluate cause of self-care deficits   - Assess range of motion  - Assess patient's mobility; develop plan if impaired  - Assess patient's need for assistive devices and provide as appropriate  - Encourage maximum independence but intervene and supervise when necessary  ¯ Involve family in performance of ADLs  ¯ Assess for home care needs following discharge   ¯ Request OT consult to assist with ADL evaluation and planning for discharge  ¯ Provide patient education as appropriate  Outcome: Progressing  Goal: Maintain or return mobility status to optimal level  Description  INTERVENTIONS:  - Assess patient's baseline mobility status (ambulation, transfers, stairs, etc )    - Identify cognitive and physical deficits and behaviors that affect mobility  - Identify mobility aids required to assist with transfers and/or ambulation (gait belt, sit-to-stand, lift, walker, cane, etc )  - [place] fall precautions as indicated by assessment  - Record patient progress and toleration of activity level on Mobility SBAR; progress patient to next Phase/Stage  - Instruct patient to call for assistance with activity based on assessment  - Request Rehabilitation consult to assist with strengthening/weightbearing, etc   Outcome: Progressing     Problem: DISCHARGE PLANNING  Goal: Discharge to home or other facility with appropriate resources  Description  INTERVENTIONS:  - Identify barriers to discharge w/patient and caregiver  - Arrange for needed discharge resources and transportation as appropriate  - Identify discharge learning needs (meds, wound care, etc )  - Arrange for interpretive services to assist at discharge as needed  - Refer to Case Management Department for coordinating discharge planning if the patient needs post-hospital services based on physician/advanced practitioner order or complex needs related to functional status, cognitive ability, or social support system  Outcome: Progressing     Problem: Knowledge Deficit  Goal: Patient/family/caregiver demonstrates understanding of disease process, treatment plan, medications, and discharge instructions  Description  Complete learning assessment and assess knowledge base    Interventions:  - Provide teaching at level of understanding  - Provide teaching via preferred learning methods  Outcome: Progressing

## 2019-07-20 NOTE — UTILIZATION REVIEW
Initial Clinical Review    Admission: Date/Time/Statement: 19 @ 1757  Orders Placed This Encounter   Procedures    Place in Observation     Standing Status:   Standing     Number of Occurrences:   1     Order Specific Question:   Admitting Physician     Answer:   Blanca Mondragon [[id number]]     Order Specific Question:   Level of Care     Answer:   Med Surg [[id number]]       Assessment/Plan: 26year old female, presented as a direct medical admission from home via car  Admitted as OBSERVATION due to need for treatment of a Pseudomonas urinary tract infection   at 14w2d  Pt was evaluated for vaginal spotting in the emergency department on 19  At that time, a urine culture was drawn that revealed 30-39K colonies of Pseudomonas aeruginosa, as well as 70-79K colonies of Proteus mirabilis  Pt was notified and instructed to return for repeat urine culture     Pt represented on  for repeat urine culture, which was positive for 80-89K colonies of Pseudomonas aeruginosa  Pt was notified of these results, and was instructed to present to [facility] for inpatient antibiotic administration  Plan:   Discussed briefly with infectious disease; will start Cefepime 2g IV q8hr with plan for 5 days of antibiotic therapy  ID to see pt tomorrow  WBC upon arrival 9 25  Vital signs stable, cont to monitor      2019  UTI: continue Cefepime 2 g IV q8hr x 5 days, follow-up ID consultation  Routine obstetric care  LR IVF, clear liquid diet  DVT PPx: SCDs  Encouraged ambulation as tolerated  2019  Consult ID:  Asymptomatic bacteriuria in pregnancy with Pseudomonas  Patient presents with repeating cultures positive for Pseudomonas and increasing colony count  She has no symptoms at this time  The patient unfortunately is 14 weeks pregnant and so is at increased risk for progressing upper urinary tract infection this is the presence of bacteriuria  Her vitals are stable and labs are stable  Susceptibilities noted  Continue patient on IV cefepime 2 g q 8 hours  Continue to trend fever curve/vitals  Repeat chemistry tomorrow  Will obtain fosfomycin susceptibilities  Additional care as per primary  Will plan to treat for total of 5 days  Oral antibiotic options limited based on this organism       Triage Vitals   Temperature Pulse Respirations Blood Pressure SpO2   07/19/19 2158 07/19/19 1600 07/19/19 1600 07/19/19 1600 07/19/19 1600   98 6 °F (37 °C) 90 18 145/90 99 %      Temp src Heart Rate Source Patient Position - Orthostatic VS BP Location FiO2 (%)   -- -- -- -- --             Pain Score       07/20/19 0106       No Pain        Wt Readings from Last 1 Encounters:   07/19/19 136 kg (300 lb)     Additional Vital Signs: NA    Pertinent Labs/Diagnostic Test Results:   Results from last 7 days   Lab Units 07/19/19  2033 07/18/19  1140   WBC Thousand/uL 9 25 8 35   HEMOGLOBIN g/dL 11 5 13 0   HEMATOCRIT % 33 8* 37 6   PLATELETS Thousands/uL 215 243   NEUTROS ABS Thousands/µL 5 79 5 41     Results from last 7 days   Lab Units 07/18/19  1140   SODIUM mmol/L 135*   POTASSIUM mmol/L 3 4*   CHLORIDE mmol/L 106   CO2 mmol/L 24   ANION GAP mmol/L 5   BUN mg/dL 6   CREATININE mg/dL 0 48*   EGFR ml/min/1 73sq m 132   CALCIUM mg/dL 8 9     Results from last 7 days   Lab Units 07/18/19  1140   AST U/L 10   ALT U/L 17   ALK PHOS U/L 46   TOTAL PROTEIN g/dL 7 5   ALBUMIN g/dL 3 8   TOTAL BILIRUBIN mg/dL 0 48     Results from last 7 days   Lab Units 07/18/19  1140   GLUCOSE RANDOM mg/dL 84     Results from last 7 days   Lab Units 07/18/19  1256 07/18/19  1251   CLARITY UA  Clear  --    COLOR UA  Yellow -   SPEC GRAV UA  1 010  --    PH UA  7 0  --    GLUCOSE UA mg/dl Negative  --    KETONES UA mg/dl Negative  --    BLOOD UA  Small*  --    PROTEIN UA mg/dl Negative  --    NITRITE UA  Negative  --    BILIRUBIN UA  Negative  --    UROBILINOGEN UA E U /dl 0 2  --    LEUKOCYTES UA  Negative  --    WBC UA /hpf None Seen  --    RBC UA /hpf 2-4*  --    BACTERIA UA /hpf Occasional  --    EPITHELIAL CELLS WET PREP /hpf None Seen  --      Results from last 7 days   Lab Units 07/18/19  1256   URINE CULTURE  80,000-89,000 cfu/ml Pseudomonas aeruginosa*  <10,000 cfu/ml        Past Medical History:   Diagnosis Date    Abnormal placenta, antepartum     resolved 7/5/17    Depression     Maternal morbid obesity, antepartum ([facility] [address] )     resolved 8/5/00 obeity complicating pregnancy, child birth or puerperium, antepartum    Varicella     varicella vaccine     Present on Admission:   Pseudomonas urinary tract infection    Admitting Diagnosis: Acute cystitis with hematuria [N30 01]  Age/Sex: 26 y o  female  Admission Orders:  Current Facility-Administered Medications:  acetaminophen 650 mg Oral Q6H PRN   cefepime 2,000 mg Intravenous Q8H   cholecalciferol 2,000 Units Oral Daily   lactated ringers 125 mL/hr Intravenous Continuous   prenatal multivitamin 1 tablet Oral Daily     [place] SCDs  IP CONSULT TO INFECTIOUS DISEASES    Network Utilization Review Department  Phone: [phone]; Fax [phone]  [email or web address]  ATTENTION: Please call with any questions or concerns to [phone]  and carefully listen to the prompts so that you are directed to the right person  Send all requests for admission clinical reviews, approved or denied determinations and any other requests to fax [phone]   All voicemails are confidential

## 2019-07-21 LAB
ANION GAP SERPL CALCULATED.3IONS-SCNC: 9 MMOL/L (ref 4–13)
BACTERIA UR CULT: ABNORMAL
BACTERIA UR CULT: ABNORMAL
BUN SERPL-MCNC: 5 MG/DL (ref 5–25)
CALCIUM SERPL-MCNC: 8.5 MG/DL (ref 8.3–10.1)
CHLORIDE SERPL-SCNC: 109 MMOL/L (ref 100–108)
CO2 SERPL-SCNC: 22 MMOL/L (ref 21–32)
CREAT SERPL-MCNC: 0.47 MG/DL (ref 0.6–1.3)
GFR SERPL CREATININE-BSD FRML MDRD: 133 ML/MIN/1.73SQ M
GLUCOSE SERPL-MCNC: 95 MG/DL (ref 65–140)
POTASSIUM SERPL-SCNC: 3.6 MMOL/L (ref 3.5–5.3)
SODIUM SERPL-SCNC: 140 MMOL/L (ref 136–145)

## 2019-07-21 PROCEDURE — 99232 SBSQ HOSP IP/OBS MODERATE 35: CPT | Performed by: INTERNAL MEDICINE

## 2019-07-21 PROCEDURE — 80048 BASIC METABOLIC PNL TOTAL CA: CPT | Performed by: INTERNAL MEDICINE

## 2019-07-21 PROCEDURE — NC001 PR NO CHARGE: Performed by: OBSTETRICS & GYNECOLOGY

## 2019-07-21 RX ADMIN — PRENATAL VIT W/ FE FUMARATE-FA TAB 27-0.8 MG 1 TABLET: 27-0.8 TAB at 08:19

## 2019-07-21 RX ADMIN — VITAMIN D, TAB 1000IU (100/BT) 2000 UNITS: 25 TAB at 08:19

## 2019-07-21 RX ADMIN — CEFEPIME HYDROCHLORIDE 2000 MG: 2 INJECTION, POWDER, FOR SOLUTION INTRAVENOUS at 18:20

## 2019-07-21 RX ADMIN — CEFEPIME HYDROCHLORIDE 2000 MG: 2 INJECTION, POWDER, FOR SOLUTION INTRAVENOUS at 09:58

## 2019-07-21 RX ADMIN — CEFEPIME HYDROCHLORIDE 2000 MG: 2 INJECTION, POWDER, FOR SOLUTION INTRAVENOUS at 02:50

## 2019-07-21 NOTE — PLAN OF CARE
Problem: PAIN - ADULT  Goal: Verbalizes/displays adequate comfort level or baseline comfort level  Description  Interventions:  - Encourage patient to monitor pain and request assistance  - Assess pain using appropriate pain scale  - Administer analgesics based on type and severity of pain and evaluate response  - Implement non-pharmacological measures as appropriate and evaluate response  - Consider cultural and social influences on pain and pain management  - Notify physician/advanced practitioner if interventions unsuccessful or patient reports new pain  Outcome: Progressing     Problem: INFECTION - ADULT  Goal: Absence or prevention of progression during hospitalization  Description  INTERVENTIONS:  - Assess and monitor for signs and symptoms of infection  - Monitor lab/diagnostic results  - Monitor all insertion sites, i e  indwelling lines, tubes, and drains  - Monitor endotracheal (as able) and nasal secretions for changes in amount and color  - [place] appropriate cooling/warming therapies per order  - Administer medications as ordered  - Instruct and encourage patient and family to use good hand hygiene technique  - Identify and instruct in appropriate isolation precautions for identified infection/condition  Outcome: Progressing  Goal: Absence of fever/infection during neutropenic period  Description  INTERVENTIONS:  - Monitor WBC  - Implement neutropenic guidelines  Outcome: Progressing     Problem: SAFETY ADULT  Goal: Patient will remain free of falls  Description  INTERVENTIONS:  - Assess patient frequently for physical needs  -  Identify cognitive and physical deficits and behaviors that affect risk of falls    -  [place] fall precautions as indicated by assessment   - Educate patient/family on patient safety including physical limitations  - Instruct patient to call for assistance with activity based on assessment  - Modify environment to reduce risk of injury  - Consider OT/PT consult to assist with strengthening/mobility  Outcome: Progressing  Goal: Maintain or return to baseline ADL function  Description  INTERVENTIONS:  -  Assess patient's ability to carry out ADLs; assess patient's baseline for ADL function and identify physical deficits which impact ability to perform ADLs (bathing, care of mouth/teeth, toileting, grooming, dressing, etc )  - Assess/evaluate cause of self-care deficits   - Assess range of motion  - Assess patient's mobility; develop plan if impaired  - Assess patient's need for assistive devices and provide as appropriate  - Encourage maximum independence but intervene and supervise when necessary  ¯ Involve family in performance of ADLs  ¯ Assess for home care needs following discharge   ¯ Request OT consult to assist with ADL evaluation and planning for discharge  ¯ Provide patient education as appropriate  Outcome: Progressing  Goal: Maintain or return mobility status to optimal level  Description  INTERVENTIONS:  - Assess patient's baseline mobility status (ambulation, transfers, stairs, etc )    - Identify cognitive and physical deficits and behaviors that affect mobility  - Identify mobility aids required to assist with transfers and/or ambulation (gait belt, sit-to-stand, lift, walker, cane, etc )  - [place] fall precautions as indicated by assessment  - Record patient progress and toleration of activity level on Mobility SBAR; progress patient to next Phase/Stage  - Instruct patient to call for assistance with activity based on assessment  - Request Rehabilitation consult to assist with strengthening/weightbearing, etc   Outcome: Progressing     Problem: DISCHARGE PLANNING  Goal: Discharge to home or other facility with appropriate resources  Description  INTERVENTIONS:  - Identify barriers to discharge w/patient and caregiver  - Arrange for needed discharge resources and transportation as appropriate  - Identify discharge learning needs (meds, wound care, etc )  - Arrange for interpretive services to assist at discharge as needed  - Refer to Case Management Department for coordinating discharge planning if the patient needs post-hospital services based on physician/advanced practitioner order or complex needs related to functional status, cognitive ability, or social support system  Outcome: Progressing     Problem: Knowledge Deficit  Goal: Patient/family/caregiver demonstrates understanding of disease process, treatment plan, medications, and discharge instructions  Description  Complete learning assessment and assess knowledge base  Interventions:  - Provide teaching at level of understanding  - Provide teaching via preferred learning methods  Outcome: Progressing     Problem: Potential for Falls  Goal: Patient will remain free of falls  Description  INTERVENTIONS:  - Assess patient frequently for physical needs  -  Identify cognitive and physical deficits and behaviors that affect risk of falls  -  [place] fall precautions as indicated by assessment   - Educate patient/family on patient safety including physical limitations  - Instruct patient to call for assistance with activity based on assessment  - Modify environment to reduce risk of injury  - Consider OT/PT consult to assist with strengthening/mobility  Outcome: Progressing     Problem: Nutrition/Hydration-ADULT  Goal: Nutrient/Hydration intake appropriate for improving, restoring or maintaining nutritional needs  Description  Monitor and assess patient's nutrition/hydration status for malnutrition (ex- brittle hair, bruises, dry skin, pale skin and conjunctiva, muscle wasting, smooth red tongue, and disorientation)  Collaborate with interdisciplinary team and initiate plan and interventions as ordered  Monitor patient's weight and dietary intake as ordered or per policy  Utilize nutrition screening tool and intervene per policy   Determine patient's food preferences and provide high-protein, high-caloric foods as appropriate       INTERVENTIONS:  - Monitor oral intake, urinary output, labs, and treatment plans  - Assess nutrition and hydration status and recommend course of action  - Evaluate amount of meals eaten  - Assist patient with eating if necessary   - Allow adequate time for meals  - Recommend/ encourage appropriate diets, oral nutritional supplements, and vitamin/mineral supplements  - Order, calculate, and assess calorie counts as needed  - Recommend, monitor, and adjust tube feedings and TPN/PPN based on assessed needs  - Assess need for intravenous fluids  - Provide specific nutrition/hydration education as appropriate  - Include patient/family/caregiver in decisions related to nutrition  Outcome: Progressing

## 2019-07-21 NOTE — UTILIZATION REVIEW
Notification of Inpatient Admission/Inpatient Authorization Request  This is a Notification of Inpatient Admission/Request for Inpatient Authorization for our facility [facility]  Be advised that this patient was admitted to our facility under Inpatient Status  Please contact the Utilization Review Department where the patient is receiving care services for additional admission information  Place of Service Code: 24   Place of Service Name: Inpatient Hospital  Presentation Date & Time: 7/19/2019  3:44 PM  Inpatient Admission Date & Time: 7/19/19 1544  Discharge Date & Time: No discharge date for patient encounter  Discharge Disposition (if discharged): Home/Self Care  Attending Physician: Phoebe Rainey, Taniya Cooper [[id number]]   Attending Physician:  ERUM Ramirez  Specialty- Obstetrics and Gynecology  [facility] ID- [id number]  [address] [place] [address]  Phone 1: [phone]  Fax: [phone]  Admission Orders (From admission, onward)    Ordered        07/20/19 1814  Inpatient Admission  Once         07/19/19 1757  Place in Observation  Once             Facility: [facility] [address] [place] [address] Utilization Review Department  Phone: [phone]; Fax [phone]  [email or web address]  ATTENTION: Please call with any questions or concerns to [phone]  and carefully listen to the prompts so that you are directed to the right person  Send all requests for admission clinical reviews, approved or denied determinations and any other requests to fax [phone]   All voicemails are confidential

## 2019-07-21 NOTE — PLAN OF CARE
Problem: PAIN - ADULT  Goal: Verbalizes/displays adequate comfort level or baseline comfort level  Description  Interventions:  - Encourage patient to monitor pain and request assistance  - Assess pain using appropriate pain scale  - Administer analgesics based on type and severity of pain and evaluate response  - Implement non-pharmacological measures as appropriate and evaluate response  - Consider cultural and social influences on pain and pain management  - Notify physician/advanced practitioner if interventions unsuccessful or patient reports new pain  Outcome: Progressing     Problem: INFECTION - ADULT  Goal: Absence or prevention of progression during hospitalization  Description  INTERVENTIONS:  - Assess and monitor for signs and symptoms of infection  - Monitor lab/diagnostic results  - Monitor all insertion sites, i e  indwelling lines, tubes, and drains  - Monitor endotracheal (as able) and nasal secretions for changes in amount and color  - [place] appropriate cooling/warming therapies per order  - Administer medications as ordered  - Instruct and encourage patient and family to use good hand hygiene technique  - Identify and instruct in appropriate isolation precautions for identified infection/condition  Outcome: Progressing  Goal: Absence of fever/infection during neutropenic period  Description  INTERVENTIONS:  - Monitor WBC  - Implement neutropenic guidelines  Outcome: Progressing     Problem: SAFETY ADULT  Goal: Patient will remain free of falls  Description  INTERVENTIONS:  - Assess patient frequently for physical needs  -  Identify cognitive and physical deficits and behaviors that affect risk of falls    -  [place] fall precautions as indicated by assessment   - Educate patient/family on patient safety including physical limitations  - Instruct patient to call for assistance with activity based on assessment  - Modify environment to reduce risk of injury  - Consider OT/PT consult to assist with strengthening/mobility  Outcome: Progressing  Goal: Maintain or return to baseline ADL function  Description  INTERVENTIONS:  -  Assess patient's ability to carry out ADLs; assess patient's baseline for ADL function and identify physical deficits which impact ability to perform ADLs (bathing, care of mouth/teeth, toileting, grooming, dressing, etc )  - Assess/evaluate cause of self-care deficits   - Assess range of motion  - Assess patient's mobility; develop plan if impaired  - Assess patient's need for assistive devices and provide as appropriate  - Encourage maximum independence but intervene and supervise when necessary  ¯ Involve family in performance of ADLs  ¯ Assess for home care needs following discharge   ¯ Request OT consult to assist with ADL evaluation and planning for discharge  ¯ Provide patient education as appropriate  Outcome: Progressing  Goal: Maintain or return mobility status to optimal level  Description  INTERVENTIONS:  - Assess patient's baseline mobility status (ambulation, transfers, stairs, etc )    - Identify cognitive and physical deficits and behaviors that affect mobility  - Identify mobility aids required to assist with transfers and/or ambulation (gait belt, sit-to-stand, lift, walker, cane, etc )  - [place] fall precautions as indicated by assessment  - Record patient progress and toleration of activity level on Mobility SBAR; progress patient to next Phase/Stage  - Instruct patient to call for assistance with activity based on assessment  - Request Rehabilitation consult to assist with strengthening/weightbearing, etc   Outcome: Progressing     Problem: DISCHARGE PLANNING  Goal: Discharge to home or other facility with appropriate resources  Description  INTERVENTIONS:  - Identify barriers to discharge w/patient and caregiver  - Arrange for needed discharge resources and transportation as appropriate  - Identify discharge learning needs (meds, wound care, etc )  - Arrange for interpretive services to assist at discharge as needed  - Refer to Case Management Department for coordinating discharge planning if the patient needs post-hospital services based on physician/advanced practitioner order or complex needs related to functional status, cognitive ability, or social support system  Outcome: Progressing     Problem: Knowledge Deficit  Goal: Patient/family/caregiver demonstrates understanding of disease process, treatment plan, medications, and discharge instructions  Description  Complete learning assessment and assess knowledge base  Interventions:  - Provide teaching at level of understanding  - Provide teaching via preferred learning methods  Outcome: Progressing     Problem: Potential for Falls  Goal: Patient will remain free of falls  Description  INTERVENTIONS:  - Assess patient frequently for physical needs  -  Identify cognitive and physical deficits and behaviors that affect risk of falls    -  [place] fall precautions as indicated by assessment   - Educate patient/family on patient safety including physical limitations  - Instruct patient to call for assistance with activity based on assessment  - Modify environment to reduce risk of injury  - Consider OT/PT consult to assist with strengthening/mobility  Outcome: Progressing

## 2019-07-21 NOTE — PROGRESS NOTES
Progress Note - Antepartum  Olya Paula 26 y o  female MRN: [id number]  Unit/Bed#: [facility] 915-01 Encounter: [id number]      Olya Paula continues to have no complaints  Optom[facility] about possible outpatient treatment  Remains without urinary complaints  /84   Pulse 89   Temp 99 3 °F (37 4 °C)   Resp 19   Ht 6' (1 829 m)   Wt 136 kg (300 lb)   LMP 04/10/2019   SpO2 98%   BMI 40 69 kg/m²     Physical Exam:   Physical Exam   Constitutional: She is oriented to person, place, and time  She appears well-developed and well-nourished  No distress  Cardiovascular: Normal rate, regular rhythm, normal heart sounds and intact distal pulses  Pulmonary/Chest: Effort normal and breath sounds normal  No stridor  No respiratory distress  Abdominal: Soft  Bowel sounds are normal  She exhibits no distension  There is no tenderness  Gravid      Neurological: She is alert and oriented to person, place, and time  Skin: Skin is warm and dry  She is not diaphoretic  Psychiatric: She has a normal mood and affect  Her behavior is normal             Lab Results   Component Value Date    WBC 9 25 07/19/2019    HGB 11 5 07/19/2019    HCT 33 8 (L) 07/19/2019     07/19/2019     Lab Results   Component Value Date     09/12/2015    K 3 6 07/21/2019     (H) 07/21/2019    CO2 22 07/21/2019    BUN 5 07/21/2019    CREATININE 0 47 (L) 07/21/2019    GLUCOSE 78 09/12/2015    AST 10 07/18/2019    ALT 17 07/18/2019       A/P: Olya Paula is a 26 y o  [facility]  at 14w4d admitted with psuedomans UTI  Currently in stable condition   Hospital Day: 3   1) UTI: continue cefepime 2 g IV q8h x 5 days, follow-up ID fosfomycin susceptibilities  2)  Routine obstetric crae  3) FEN: LR IVF, regular  4) DVT PPx: SCDs  5) Encouraged ambulation as tolerated  7) Dispo: inpatient    [place], DO

## 2019-07-21 NOTE — PROGRESS NOTES
Progress Note - Infectious Disease   Hope Line 26 y o  female MRN: [id number]  Unit/Bed#: [facility] 915-01 Encounter: [id number]      Impression/Plan:  1  Asymptomatic bacteriuria in pregnancy with Pseudomonas  Patient presents with repeating cultures positive for Pseudomonas and increasing colony count  She has no symptoms at this time  The patient is 14 weeks pregnant and so she is at increased risk for progressing upper urinary tract infection in the presence of bacteriuria  Her vitals are stable and labs are stable  Susceptibilities noted  Continue patient on IV cefepime 2 g q 8 hours  Continue to trend fever curve/vitals  Additional care as per primary  Will plan to treat for total of 5 days through 7/23  Last doses of antibiotics ordered  Patient unfortunately does not have an oral antibiotic option  Repeat urine cultures as per OB as an outpatient     2  Intrauterine pregnancy at 14 weeks  Patient appears to be doing well otherwise during the course of this 2nd pregnancy  Ongoing follow-up and care as per primary      3  Morbid obesity  Patient noted with BMI of 40  Ongoing care as per primary service      Above plan discussed in detail with the patient      Above plan discussed with resident      ID consult service will continue to follow  Antibiotics:  Cefepime    24 hour events:  Patient is currently afebrile  Her other vitals are stable  Creatinine is stable  No other acute events noted overnight on chart review  Urine cultures resistant to fosfomycin    Subjective:  Patient has no fever, chills, sweats; no nausea, vomiting, diarrhea; no cough, shortness of breath; no pain  No new symptoms  She is tolerating antibiotic without development of diarrhea or rash  She is aware that she has no oral antibiotic options      Objective:  Vitals:  Temp:  [99 °F (37 2 °C)-99 3 °F (37 4 °C)] 99 °F (37 2 °C)  HR:  [77-99] 77  Resp:  [19] 19  BP: (112-144)/(66-84) 112/66  SpO2:  [97 %-99 %] 97 %  Temp (24hrs), Av 1 °F (37 3 °C), Min:99 °F (37 2 °C), Max:99 3 °F (37 4 °C)  Current: Temperature: 99 °F (37 2 °C)    Physical Exam:   General Appearance:  Alert, interactive, nontoxic, no acute distress  Throat: Oropharynx moist without lesions  Lungs:   Clear to auscultation bilaterally; no wheezes, rhonchi or rales; respirations unlabored   Heart:  RRR; no murmur, rub or gallop   Abdomen:   Soft, non-tender, non-distended, positive bowel sounds  Extremities: No clubbing, cyanosis or edema   Skin: No new rashes or lesions  No new draining wounds noted         Labs, Imaging, & Other studies:   All pertinent labs and imaging studies were personally reviewed  Results from last 7 days   Lab Units 19  2033 19  1140   WBC Thousand/uL 9 25 8 35   HEMOGLOBIN g/dL 11 5 13 0   PLATELETS Thousands/uL 215 243     Results from last 7 days   Lab Units 19  0551 19  1140   POTASSIUM mmol/L 3 6 3 4*   CHLORIDE mmol/L 109* 106   CO2 mmol/L 22 24   BUN mg/dL 5 6   CREATININE mg/dL 0 47* 0 48*   EGFR ml/min/1 73sq m 133 132   CALCIUM mg/dL 8 5 8 9   AST U/L  --  10   ALT U/L  --  17   ALK PHOS U/L  --  46     Results from last 7 days   Lab Units 19  1256   URINE CULTURE  80,000-89,000 cfu/ml Pseudomonas aeruginosa*  <10,000 cfu/ml

## 2019-07-21 NOTE — UTILIZATION REVIEW
Initial Clinical Review     Admission: Date/Time/Statement: 19 @ 1757 UPGRADED TO INPATIENT  @ [address] OF UTI    19 1815  Inpatient Admission Once     Transfer Service: OB/GYN       Question Answer Comment   Admitting Physician Ulisses Caldera    Level of Care Med Surg        19 1814      Assessment/Plan: 26year old female, presented as a direct medical admission from home via car  Admitted as OBSERVATION due to need for treatment of a Pseudomonas urinary tract infection   at 14w2d  Pt was evaluated for vaginal spotting in the emergency department on 19  At that time, a urine culture was drawn that revealed 30-39K colonies of Pseudomonas aeruginosa, as well as 70-79K colonies of Proteus mirabilis  Pt was notified and instructed to return for repeat urine culture     Pt represented on  for repeat urine culture, which was positive for 80-89K colonies of Pseudomonas aeruginosa  Pt was notified of these results, and was instructed to present to [facility] for inpatient antibiotic administration  Plan:   Discussed briefly with infectious disease; will start Cefepime 2g IV q8hr with plan for 5 days of antibiotic therapy  ID to see pt tomorrow  WBC upon arrival 9 25  Vital signs stable, cont to monitor      2019  UTI: continue Cefepime 2 g IV q8hr x 5 days, follow-up ID consultation  Routine obstetric care  LR IVF, clear liquid diet  DVT PPx: SCDs  Encouraged ambulation as tolerated           2019  Consult ID:  Asymptomatic bacteriuria in pregnancy with Pseudomonas   Patient presents with repeating cultures positive for Pseudomonas and increasing colony count   She has no symptoms at this time   The patient unfortunately is 14 weeks pregnant and so is at increased risk for progressing upper urinary tract infection this is the presence of bacteriuria   Her vitals are stable and labs are stable   Susceptibilities noted    Continue patient on IV cefepime 2 g q 8 hours  Continue to trend fever curve/vitals  Repeat chemistry tomorrow  Will obtain fosfomycin susceptibilities  Additional care as per primary  Will plan to treat for total of 5 days    Oral antibiotic options limited based on this organism                Triage Vitals   Temperature Pulse Respirations Blood Pressure SpO2   07/19/19 2158 07/19/19 1600 07/19/19 1600 07/19/19 1600 07/19/19 1600   98 6 °F (37 °C) 90 18 145/90 99 %       Temp src Heart Rate Source Patient Position - Orthostatic VS BP Location FiO2 (%)   -- -- -- -- --                   Pain Score           07/20/19 0106           No Pain            Wt Readings from Last 1 Encounters:   07/19/19 136 kg (300 lb)      Additional Vital Signs:  Date/Time  Temp  Pulse  Resp  BP  SpO2  O2 Device   07/21/19 0845  --  --  --  --  --  None (Room air)   07/21/19 0726  --  77  --  --  97 %  --   07/21/19 0725  --  86  --  --  97 %  --   07/21/19 0724  99 °F (37 2 °C)  77  --  112/66  98 %  --   07/21/19 0723  --  79  --  --  97 %  --   07/20/19 2247  99 3 °F (37 4 °C)  89  --  144/84  98 %  --   07/20/19 2246  --  --  19  --  --  --   07/20/19 1516  --  94  --  --  98 %  --   07/20/19 1515  99 1 °F (37 3 °C)  86  --  132/73  99 %  --   07/20/19 1514  --  99  --  --  99 %  --   07/20/19 1513  --  93  --  --  98 %  --   07/20/19 0850  --  --  --  --  --  None (Room air)   07/20/19 0756  --  74  --  --  98 %  --   07/20/19 0755  --  73  --  --  98 %  --   07/20/19 0754  --  83  --  --  99 %  --   07/20/19 0753  --  93  --  141/83  99 %  --   07/19/19 2158  98 6 °F (37 °C)  82  20  136/80  98 %  --   07/19/19 1600  --  90  18  145/90  99 %  --       Pertinent Labs/Diagnostic Test Results:         Results from last 7 days   Lab Units 07/19/19 2033 07/18/19  1140   WBC Thousand/uL 9 25 8 35   HEMOGLOBIN g/dL 11 5 13 0   HEMATOCRIT % 33 8* 37 6   PLATELETS Thousands/uL 215 243   NEUTROS ABS Thousands/µL 5 79 5 41           Results from last 7 days   Lab Units 07/18/19  1140   SODIUM mmol/L 135*   POTASSIUM mmol/L 3 4*   CHLORIDE mmol/L 106   CO2 mmol/L 24   ANION GAP mmol/L 5   BUN mg/dL 6   CREATININE mg/dL 0 48*   EGFR ml/min/1 73sq m 132   CALCIUM mg/dL 8 9           Results from last 7 days   Lab Units 07/18/19  1140   AST U/L 10   ALT U/L 17   ALK PHOS U/L 46   TOTAL PROTEIN g/dL 7 5   ALBUMIN g/dL 3 8   TOTAL BILIRUBIN mg/dL 0 48           Results from last 7 days   Lab Units 07/18/19  1140   GLUCOSE RANDOM mg/dL 84            Results from last 7 days   Lab Units 07/18/19  1256 07/18/19  1251   CLARITY UA   Clear  --    COLOR UA   Yellow -   SPEC GRAV UA   1 010  --    PH UA   7 0  --    GLUCOSE UA mg/dl Negative  --    KETONES UA mg/dl Negative  --    BLOOD UA   Small*  --    PROTEIN UA mg/dl Negative  --    NITRITE UA   Negative  --    BILIRUBIN UA   Negative  --    UROBILINOGEN UA E U /dl 0 2  --    LEUKOCYTES UA   Negative  --    WBC UA /hpf None Seen  --    RBC UA /hpf 2-4*  --    BACTERIA UA /hpf Occasional  --    EPITHELIAL CELLS WET PREP /hpf None Seen  --            Results from last 7 days   Lab Units 07/18/19  1256   URINE CULTURE   80,000-89,000 cfu/ml Pseudomonas aeruginosa*  <10,000 cfu/ml               Past Medical History:   Diagnosis Date    Abnormal placenta, antepartum       resolved 7/5/17    Depression      Maternal morbid obesity, antepartum ([facility] [address] )       resolved 3/9/30 obeity complicating pregnancy, child birth or puerperium, antepartum    Varicella       varicella vaccine      Present on Admission:   Pseudomonas urinary tract infection     Admitting Diagnosis: Acute cystitis with hematuria [N30 01]  Age/Sex: 26 y o  female  Admission Orders:  Current Facility-Administered Medications:  acetaminophen 650 mg Oral Q6H PRN   cefepime 2,000 mg Intravenous Q8H   cholecalciferol 2,000 Units Oral Daily   lactated ringers 125 mL/hr Intravenous Continuous   prenatal multivitamin 1 tablet Oral Daily      Varun SCDs  IP CONSULT TO INFECTIOUS DISEASES     Network Utilization Review Department  Phone: [phone]; Fax [phone]  [email or web address]  ATTENTION: Please call with any questions or concerns to [phone]  and carefully listen to the prompts so that you are directed to the right person  Send all requests for admission clinical reviews, approved or denied determinations and any other requests to fax [phone]   All voicemails are confidential

## 2019-07-22 PROCEDURE — 99231 SBSQ HOSP IP/OBS SF/LOW 25: CPT | Performed by: OBSTETRICS & GYNECOLOGY

## 2019-07-22 PROCEDURE — 99232 SBSQ HOSP IP/OBS MODERATE 35: CPT | Performed by: INTERNAL MEDICINE

## 2019-07-22 RX ADMIN — CEFEPIME HYDROCHLORIDE 2000 MG: 2 INJECTION, POWDER, FOR SOLUTION INTRAVENOUS at 02:30

## 2019-07-22 RX ADMIN — CEFEPIME HYDROCHLORIDE 2000 MG: 2 INJECTION, POWDER, FOR SOLUTION INTRAVENOUS at 18:00

## 2019-07-22 RX ADMIN — PRENATAL VIT W/ FE FUMARATE-FA TAB 27-0.8 MG 1 TABLET: 27-0.8 TAB at 08:26

## 2019-07-22 RX ADMIN — VITAMIN D, TAB 1000IU (100/BT) 2000 UNITS: 25 TAB at 08:26

## 2019-07-22 RX ADMIN — CEFEPIME HYDROCHLORIDE 2000 MG: 2 INJECTION, POWDER, FOR SOLUTION INTRAVENOUS at 10:17

## 2019-07-22 NOTE — QUICK NOTE
On bedside TAUS, fetus is vertex with FHT in the 130s, frequent fetal movement noted      Onita Collegeorgina DO  PGY-3 OB/GYN   7/22/2019 3:04 PM

## 2019-07-22 NOTE — PROGRESS NOTES
Progress Note - Infectious Disease   Ziggy Sams 26 y o  female MRN: [id number]  Unit/Bed#: [facility] 915-01 Encounter: [id number]      Impression/Plan:  1  Asymptomatic bacteriuria in pregnancy with Pseudomonas   Patient presents with repeating cultures positive for Pseudomonas and increasing colony count   She has no symptoms at this time   The patient is 14 weeks pregnant and so she is at increased risk for progressing upper urinary tract infection in the presence of bacteriuria   Her vitals are stable and labs are stable   Susceptibilities noted  Continue patient on IV cefepime 2 g q 8 hours  Continue to trend fever curve/vitals  Additional care as per primary  Will plan to treat for total of 5 days through   Last doses of antibiotics ordered  Patient unfortunately does not have an oral antibiotic option  Repeat chemistry ordered for tomorrow  Repeat urine cultures as per OB as an outpatient     2  Intrauterine pregnancy at 14 weeks   Patient appears to be doing well otherwise during the course of this 2nd pregnancy   Ongoing follow-up and care as per primary      3  Morbid obesity   Patient noted with BMI of 40  Ongoing care as per primary service      Above plan discussed in detail with the patient      ID consult service will continue to follow  Antibiotics:  Cefepime    24 hour events:  No acute events noted overnight on chart review  No labs collected this morning  Patient is currently afebrile  Other vitals are stable    Subjective:  Patient has no fever, chills, sweats; no nausea, vomiting, diarrhea; no cough, shortness of breath; no pain  No new symptoms  She is currently tolerating antibiotic without development of rash      Objective:  Vitals:  Temp:  [98 4 °F (36 9 °C)-99 3 °F (37 4 °C)] 98 4 °F (36 9 °C)  HR:  [] 90  Resp:  [20] 20  BP: (104-156)/(64-79) 112/65  SpO2:  [96 %-98 %] 97 %  Temp (24hrs), Av 8 °F (37 1 °C), Min:98 4 °F (36 9 °C), Max:99 3 °F (37 4 °C)  Current: Temperature: 98 4 °F (36 9 °C)    Physical Exam:   General Appearance:  Alert, interactive, nontoxic, no acute distress  Throat: Oropharynx moist without lesions  Lungs:   Clear to auscultation bilaterally; no wheezes, rhonchi or rales; respirations unlabored   Heart:  RRR; no murmur, rub or gallop   Abdomen:   Soft, non-tender, non-distended, positive bowel sounds  Extremities: No clubbing, cyanosis or edema   Skin: No new rashes or lesions  No new draining wounds noted         Labs, Imaging, & Other studies:   All pertinent labs and imaging studies were personally reviewed  Results from last 7 days   Lab Units 07/19/19  2033 07/18/19  1140   WBC Thousand/uL 9 25 8 35   HEMOGLOBIN g/dL 11 5 13 0   PLATELETS Thousands/uL 215 243     Results from last 7 days   Lab Units 07/21/19  0551 07/18/19  1140   POTASSIUM mmol/L 3 6 3 4*   CHLORIDE mmol/L 109* 106   CO2 mmol/L 22 24   BUN mg/dL 5 6   CREATININE mg/dL 0 47* 0 48*   EGFR ml/min/1 73sq m 133 132   CALCIUM mg/dL 8 5 8 9   AST U/L  --  10   ALT U/L  --  17   ALK PHOS U/L  --  46     Results from last 7 days   Lab Units 07/18/19  1256   URINE CULTURE  80,000-89,000 cfu/ml Pseudomonas aeruginosa*  <10,000 cfu/ml

## 2019-07-22 NOTE — PROGRESS NOTES
Gynecology Progress note   Armando Canales 26 y o  female MRN: [id number]  Unit/Bed#: [facility] 915-01 Encounter: [id number]    Armando Canales has no current complaints  She denies complaints of pain  Patient is currently voiding  She is ambulating  Patient is currently passing flatus and has had bowel movements  She is tolerating PO, and denies nausea or vomiting  Patient denies fever, chills, chest pain, shortness of breath, or calf tenderness  She denies cramping, abdominal pain, vaginal bleeding or discharge  She denies urinary complaints or CVA tenderness  /64   Pulse 78   Temp 99 3 °F (37 4 °C)   Resp 20   Ht 6' (1 829 m)   Wt 136 kg (300 lb)   LMP 04/10/2019   SpO2 98%   BMI 40 69 kg/m²     I/O last 3 completed shifts: In: 1850 [P O :1800; IV Piggyback:50]  Out: 0   I/O this shift:  In: 278 3 [IV Piggyback:278 3]  Out: -     Lab Results   Component Value Date    WBC 9 25 07/19/2019    HGB 11 5 07/19/2019    HCT 33 8 (L) 07/19/2019    MCV 86 07/19/2019     07/19/2019       Lab Results   Component Value Date    GLUCOSE 78 09/12/2015    CALCIUM 8 5 07/21/2019     09/12/2015    K 3 6 07/21/2019    CO2 22 07/21/2019     (H) 07/21/2019    BUN 5 07/21/2019    CREATININE 0 47 (L) 07/21/2019       No results found for: POCGLU    Physical Exam  Physical Exam   Constitutional: She is oriented to person, place, and time  She appears well-developed and well-nourished  No distress  Cardiovascular: Normal rate, regular rhythm and normal heart sounds  Exam reveals no gallop and no friction rub  No murmur heard  Pulmonary/Chest: Effort normal and breath sounds normal  No respiratory distress  She has no wheezes  She has no rales  She exhibits no tenderness  Abdominal: Soft  There is no tenderness  There is no rebound and no guarding  Musculoskeletal: She exhibits no edema, tenderness or deformity  Neurological: She is alert and oriented to person, place, and time     Skin: Skin is warm and dry  She is not diaphoretic  Psychiatric: She has a normal mood and affect  Her behavior is normal        Assessment / Plan:   Girish Chen is a 26 y o  Winda Mews at 14w5d admitted with an asymptomatic bacteriuria with pseudomonas, doing well without complaints  Hospital Day #4     1) Asymptomatic bacteriuria with pseudomonas:    ID following: Continue Cefepime 2g IV Q8 hours x 5 days through 7/23  Appreciate continued ID recommendations    2) [facility] IUP at 14 weeks: Continue routine obstetric care      3) FEN: Regular diet    4) DVT PPx: SCDs, encouraged ambulation as tolerated    5) Disposition: Inpatient

## 2019-07-23 LAB
ANION GAP SERPL CALCULATED.3IONS-SCNC: 8 MMOL/L (ref 4–13)
BUN SERPL-MCNC: 9 MG/DL (ref 5–25)
CALCIUM SERPL-MCNC: 8.6 MG/DL (ref 8.3–10.1)
CHLORIDE SERPL-SCNC: 108 MMOL/L (ref 100–108)
CO2 SERPL-SCNC: 24 MMOL/L (ref 21–32)
CREAT SERPL-MCNC: 0.48 MG/DL (ref 0.6–1.3)
GFR SERPL CREATININE-BSD FRML MDRD: 132 ML/MIN/1.73SQ M
GLUCOSE SERPL-MCNC: 93 MG/DL (ref 65–140)
POTASSIUM SERPL-SCNC: 3.8 MMOL/L (ref 3.5–5.3)
SODIUM SERPL-SCNC: 140 MMOL/L (ref 136–145)

## 2019-07-23 PROCEDURE — 99231 SBSQ HOSP IP/OBS SF/LOW 25: CPT | Performed by: OBSTETRICS & GYNECOLOGY

## 2019-07-23 PROCEDURE — 80048 BASIC METABOLIC PNL TOTAL CA: CPT | Performed by: INTERNAL MEDICINE

## 2019-07-23 PROCEDURE — 99232 SBSQ HOSP IP/OBS MODERATE 35: CPT | Performed by: INTERNAL MEDICINE

## 2019-07-23 RX ADMIN — VITAMIN D, TAB 1000IU (100/BT) 2000 UNITS: 25 TAB at 08:21

## 2019-07-23 RX ADMIN — CEFEPIME HYDROCHLORIDE 2000 MG: 2 INJECTION, POWDER, FOR SOLUTION INTRAVENOUS at 10:59

## 2019-07-23 RX ADMIN — CEFEPIME HYDROCHLORIDE 2000 MG: 2 INJECTION, POWDER, FOR SOLUTION INTRAVENOUS at 02:13

## 2019-07-23 RX ADMIN — CEFEPIME HYDROCHLORIDE 2000 MG: 2 INJECTION, POWDER, FOR SOLUTION INTRAVENOUS at 19:07

## 2019-07-23 RX ADMIN — PRENATAL VIT W/ FE FUMARATE-FA TAB 27-0.8 MG 1 TABLET: 27-0.8 TAB at 08:21

## 2019-07-23 NOTE — PROGRESS NOTES
Progress Note - Infectious Disease   Marlene Mitchell 26 y o  female MRN: [id number]  Unit/Bed#: [facility] 915-01 Encounter: [id number]      Impression/Plan:  1  Asymptomatic bacteriuria in pregnancy with Pseudomonas   Patient presents with repeating cultures positive for Pseudomonas and increasing colony count   She has no symptoms at this time   The patient is 14 weeks pregnant and so she is at increased risk for progressing upper urinary tract infection in the presence of bacteriuria   Her vitals are stable and labs are stable   Susceptibilities noted  Continue patient on IV cefepime 2 g q 8 hours  Continue to trend fever curve/vitals  Additional care as per primary  Will plan to treat for total of 5 days through 7/23  Last doses of antibiotics ordered  Patient unfortunately does not have an oral antibiotic option  No further labs at this point  Repeat urine cultures as per OB as an outpatient  Patient is cleared from an ID standpoint for discharge after last dose of antibiotic tomorrow     2  Intrauterine pregnancy at 15 weeks   Patient appears to be doing well otherwise during the course of this 2nd pregnancy   Ongoing follow-up and care as per primary      3  Morbid obesity   Patient noted with BMI of 40  Ongoing care as per primary service      Above plan discussed in detail with the patient      ID consult service will continue to follow peripherally  Please call if any additional questions or concerns  Antibiotics:  Cefepime    24 hour events:  Patient is currently afebrile  No new culture data  Patient's creatinine is stable  Patient's other vitals are stable  No acute events noted overnight on chart review    Subjective:  Patient has no fever, chills, sweats; no nausea, vomiting, diarrhea; no cough, shortness of breath; no pain  No new symptoms  She is currently tolerating antibiotic without development of rash      Objective:  Vitals:  Temp:  [98 6 °F (37 °C)-98 8 °F (37 1 °C)] 98 8 °F (37 1 °C)  HR: [72-78] 72  Resp:  [18-20] 18  BP: (128-141)/(65-82) 128/65  SpO2:  [98 %-99 %] 98 %  Temp (24hrs), Av 7 °F (37 1 °C), Min:98 6 °F (37 °C), Max:98 8 °F (37 1 °C)  Current: Temperature: 98 8 °F (37 1 °C)    Physical Exam:   General Appearance:  Alert, interactive, nontoxic, no acute distress  Throat: Oropharynx moist without lesions  Lungs:   Clear to auscultation bilaterally; no wheezes, rhonchi or rales; respirations unlabored   Heart:  RRR; no murmur, rub or gallop   Abdomen:   Soft, non-tender, non-distended, positive bowel sounds  Extremities: No clubbing, cyanosis or edema   Skin: No new rashes or lesions  No new draining wounds noted  Labs, Imaging, & Other studies:   All pertinent labs and imaging studies were personally reviewed  Results from last 7 days   Lab Units 19  2033 19  1140   WBC Thousand/uL 9 25 8 35   HEMOGLOBIN g/dL 11 5 13 0   PLATELETS Thousands/uL 215 243     Results from last 7 days   Lab Units 19  0607  19  1140   POTASSIUM mmol/L 3 8   < > 3 4*   CHLORIDE mmol/L 108   < > 106   CO2 mmol/L 24   < > 24   BUN mg/dL 9   < > 6   CREATININE mg/dL 0 48*   < > 0 48*   EGFR ml/min/1 73sq m 132   < > 132   CALCIUM mg/dL 8 6   < > 8 9   AST U/L  --   --  10   ALT U/L  --   --  17   ALK PHOS U/L  --   --  46    < > = values in this interval not displayed       Results from last 7 days   Lab Units 19  1256   URINE CULTURE  80,000-89,000 cfu/ml Pseudomonas aeruginosa*  <10,000 cfu/ml

## 2019-07-23 NOTE — PROGRESS NOTES
Progress Note - OB/GYN   Benitez Phillips 26 y o  female MRN: [id number]  Unit/Bed#: [facility] 915-01 Encounter: [id number]    Assessment:  26 y o   at 14 weeks and 6 days with asymptomatic pseudomonas bacteriuria on Day 5 of antibiotics, doing well  (HD#5)    Plan:  1  Asymptomatic pseudomonas bacteriuria  - ID consulted: Continue IV cefepime 2g q8 hrs  - Will plan for repeat urine cultures in 3 weeks outpatient    2  IUP at 14 weeks  - FHTs 130 yesterday with good fetal movement on ultrasound  - Continue routine obstetric care    3  FEN: Regular diet    4  DVT PPX: SCDs, ambulation    5  Disposition: Inpatient with planned discharge tomorrow after last dose of antibiotic (15 total doses, last dose should be tomorrow at 1000)    Subjective/Objective   Subjective:     Pain: no  Tolerating PO: yes  Voiding: yes  Flatus: yes  BM: yes  Ambulating: yes  Chest pain: no  Shortness of breath: no  Leg pain: no  Fevers: no  Chills: no    She denies any urinary complaints and states that she is still asymptomatic  She denies CVA tenderness  She denies vaginal bleeding, loss of fluid, cramping  She reports that Dr Dong Leonard did a scan yesterday for her and the baby was moving well with appropriate heart tones  She is anxious for discharge tomorrow after her last dose of antibiotics  Objective:     Vitals: Blood pressure 141/82, pulse 78, temperature 98 6 °F (37 °C), resp  rate 20, height 6' (1 829 m), weight 136 kg (300 lb), last menstrual period 04/10/2019, SpO2 98 %  Physical Exam:     Physical Exam   Constitutional: She appears well-developed and well-nourished  Cardiovascular: Normal rate, regular rhythm and normal heart sounds  Exam reveals no gallop and no friction rub  No murmur heard  Pulmonary/Chest: Effort normal and breath sounds normal  No stridor  No respiratory distress  She has no wheezes  Abdominal: Soft  She exhibits no distension  There is no tenderness  There is no guarding     Skin: Skin is warm and dry  Psychiatric: She has a normal mood and affect  Her behavior is normal    Vitals reviewed        Lab Results   Component Value Date    WBC 9 25 07/19/2019    HGB 11 5 07/19/2019    HCT 33 8 (L) 07/19/2019    MCV 86 07/19/2019     07/19/2019       Katelin Vera MD  07/23/19

## 2019-07-24 VITALS
TEMPERATURE: 99.1 F | WEIGHT: 293 LBS | HEART RATE: 81 BPM | BODY MASS INDEX: 39.68 KG/M2 | RESPIRATION RATE: 20 BRPM | DIASTOLIC BLOOD PRESSURE: 75 MMHG | OXYGEN SATURATION: 98 % | HEIGHT: 72 IN | SYSTOLIC BLOOD PRESSURE: 120 MMHG

## 2019-07-24 DIAGNOSIS — N30.00 ACUTE CYSTITIS WITHOUT HEMATURIA: Primary | ICD-10-CM

## 2019-07-24 PROCEDURE — 99238 HOSP IP/OBS DSCHRG MGMT 30/<: CPT | Performed by: OBSTETRICS & GYNECOLOGY

## 2019-07-24 PROCEDURE — NC001 PR NO CHARGE: Performed by: OBSTETRICS & GYNECOLOGY

## 2019-07-24 RX ADMIN — CEFEPIME HYDROCHLORIDE 2000 MG: 2 INJECTION, POWDER, FOR SOLUTION INTRAVENOUS at 10:34

## 2019-07-24 RX ADMIN — PRENATAL VIT W/ FE FUMARATE-FA TAB 27-0.8 MG 1 TABLET: 27-0.8 TAB at 08:46

## 2019-07-24 RX ADMIN — VITAMIN D, TAB 1000IU (100/BT) 2000 UNITS: 25 TAB at 08:46

## 2019-07-24 RX ADMIN — CEFEPIME HYDROCHLORIDE 2000 MG: 2 INJECTION, POWDER, FOR SOLUTION INTRAVENOUS at 03:10

## 2019-07-24 NOTE — DISCHARGE SUMMARY
Discharge Summary - Gynecology  Aleena Rosenberg 26 y o  female MRN: [id number]  Unit/Bed#: [facility] 915-01 Encounter: [id number]    Admission Date: 7/19/2019   Discharge Date: 07/24/19    Attending Physician: Dr Rakesh Schmid and Dr Dilcia Lynch Physician(s): Infectious Disease    Admitting Diagnosis:   Acute cystitis with hematuria [N30 01]    Discharge Diagnosis: s/p Treatment for Asymptomatic Pseudomonas Bacteriuria     Hospital Course: The patient presented on day of admission for IV antibiotic therapy due to pseudomonas bacteriuria  She was unable to complete outpatient treatment as she is 14 weeks pregnant and resistant to Fosfomycin  She received 15 doses of cefepime and was asymptomatic and without complaint during her hospitalization  The patient was discharged home on HD#6 in stable condition  She was asked to follow up with [facility] in 3 weeks for a repeat urine culture and routine obstetric care  Lab Results:   Lab Results   Component Value Date    WBC 9 25 07/19/2019    HGB 11 5 07/19/2019    HCT 33 8 (L) 07/19/2019    MCV 86 07/19/2019     07/19/2019     Lab Results   Component Value Date    GLUCOSE 78 09/12/2015    CALCIUM 8 6 07/23/2019     09/12/2015    K 3 8 07/23/2019    CO2 24 07/23/2019     07/23/2019    BUN 9 07/23/2019    CREATININE 0 48 (L) 07/23/2019     No results found for: POCGLU  No results found for: PTT  No results found for: INR, PROTIME    Condition at Discharge: good     Discharge Medications: See after visit summary for reconciled discharge medications provided to patient and family  Discharge instructions/Information to patient and family: See after visit summary for information provided to patient and family  Provisions for Follow-Up Care: See after visit summary for information related to follow-up care and any pertinent home health orders  Disposition: Home    Planned Readmission: No    Valentina Nunes MD  OB/GYN  7/24/2019  6:42 AM

## 2019-07-24 NOTE — DISCHARGE INSTRUCTIONS
Pregnancy at 15 to 18 [address]:   Now that you are in your second trimester, you have more energy  You may also feel hungrier than usual  You may start to experience other symptoms, such as heartburn or dizziness  You may be gaining about ½ to 1 pound a week, and your pregnancy is beginning to show  You may need to start wearing maternity clothes  DISCHARGE INSTRUCTIONS:   Seek care immediately if:   · You have pain or cramping in your abdomen or low back  · You have heavy vaginal bleeding or clotting  · You pass material that looks like tissue or large clots  Collect the material and bring it with you  Contact your healthcare provider if:   · You cannot keep food or drinks down, and you are losing weight  · You have light bleeding  · You have chills or a fever  · You have vaginal itching, burning, or pain  · You have yellow, green, white, or foul-smelling vaginal discharge  · You have pain or burning when you urinate, less urine than usual, or pink or bloody urine  · You have questions or concerns about your condition or care  How to care for yourself at this stage of your pregnancy:   · Manage heartburn  by eating 4 or 5 small meals each day instead of large meals  Avoid spicy foods  Avoid eating right before bedtime  · Manage nausea and vomiting  Avoid fatty and spicy foods  Eat small meals throughout the day instead of large meals  Shilpa may help to decrease nausea  Ask your healthcare provider about other ways of decreasing nausea and vomiting  · Eat a variety of healthy foods  Healthy foods include fruits, vegetables, whole-grain breads, low-fat dairy foods, beans, lean meats, and fish  Drink liquids as directed  Ask how much liquid to drink each day and which liquids are best for you  Limit caffeine to less than 200 milligrams each day  Limit your intake of fish to 2 servings each week   Choose fish low in mercury such as canned light tuna, shrimp, salmon, cod, or tilapia  Do not  eat fish high in mercury such as swordfish, tilefish, david mackerel, and shark  · Take prenatal vitamins as directed  Your need for certain vitamins and minerals, such as folic acid, increases during pregnancy  Prenatal vitamins provide some of the extra vitamins and minerals you need  Prenatal vitamins may also help to decrease the risk of certain birth defects  · Do not smoke  If you smoke, it is never too late to quit  Smoking increases your risk of a miscarriage and other health problems during your pregnancy  Smoking can cause your baby to be born too early or weigh less at birth  Ask your healthcare provider for information if you need help quitting  · Do not drink alcohol  Alcohol passes from your body to your baby through the placenta  It can affect your baby's brain development and cause fetal alcohol syndrome (FAS)  FAS is a group of conditions that causes mental, behavior, and growth problems  · Talk to your healthcare provider before you take any medicines  Many medicines may harm your baby if you take them when you are pregnant  Do not take any medicines, vitamins, herbs, or supplements without first talking to your healthcare provider  Never use illegal or street drugs (such as marijuana or cocaine) while you are pregnant  Safety tips during pregnancy:   · Avoid hot tubs and saunas  Do not use a hot tub or sauna while you are pregnant, especially during your first trimester  Hot tubs and saunas may raise your baby's temperature and increase the risk of birth defects  · Avoid toxoplasmosis  This is an infection caused by eating raw meat or being around infected cat feces  It can cause birth defects, miscarriages, and other problems  Wash your hands after you touch raw meat  Make sure any meat is well-cooked before you eat it  Avoid raw eggs and unpasteurized milk   Use gloves or ask someone else to clean your cat's litter box while you are pregnant  Changes that are happening with your baby:  By 18 weeks, your baby may be about 6 inches long from the top of the head to the rump (baby's bottom)  Your baby may weighabout 11 ounces  You may be able to feel your baby's movement at about 18 weeks or later  The first movements may not be that noticeable  They may feel like a fluttering sensation  Your baby also makes sucking movements and can hear certain sounds  What you need to know about prenatal care:  During the first 28 weeks of your pregnancy, you will see your healthcare provider once a month  Your healthcare provider will check your blood pressure and weight  You may also need any of the following:  · A urine test  may also be done to check for sugar and protein  These can be signs of gestational diabetes or infection  · A blood test  may be done to check for anemia (low iron level)  · Fundal height check  is a measurement of your uterus to check your baby's growth  This number is usually the same as the number of weeks that you have been pregnant  · An ultrasound  may be done to check your baby's development  Your healthcare provider may be able to tell you what your baby's gender is during the ultrasound  · Your baby's heart rate  will be checked  © 2017 [employer] Information is for End User's use only and may not be sold, redistributed or otherwise used for commercial purposes  All illustrations and images included in CareNotes® are the copyrighted property of A [employer] A M [employer] Inc  or Adithya Cordero  The above information is an  only  It is not intended as medical advice for individual conditions or treatments  Talk to your doctor, nurse or pharmacist before following any medical regimen to see if it is safe and effective for you  Urinary Tract Infection in Pregnancy   WHAT YOU NEED TO KNOW:   A urinary tract infection (UTI) is caused by bacteria that get inside your urinary tract   The urinary tract includes your kidneys and bladder  UTIs are common in women, especially during pregnancy  This is because of changes in your immune system, hormones, and uterus  As your uterus grows, your bladder may not completely empty  Bacteria can grow in the urine left in your bladder and cause a UTI  UTIs during pregnancy can increase your risk for a kidney infection and  labor  DISCHARGE INSTRUCTIONS:   Seek care immediately if:   · You are urinating very little or not at all  · You have severe pain  · You have a fever and chills  Contact your healthcare provider if:   · You have pain in the sides of your back  · You do not feel better after 2 days of treatment  · You are vomiting  · You have questions or concerns about your condition or care  Medicines:   · Antibiotics  help fight a bacterial infection  · Medicines  may be given to decrease pain and burning when you urinate  They will also help decrease the feeling that you need to urinate often  These medicines will make your urine orange or red  · Take your medicine as directed  Contact your healthcare provider if you think your medicine is not helping or if you have side effects  Tell him or her if you are allergic to any medicine  Keep a list of the medicines, vitamins, and herbs you take  Include the amounts, and when and why you take them  Bring the list or the pill bottles to follow-up visits  Carry your medicine list with you in case of an emergency  Prevent another UTI:   · Urinate when you feel the urge  Do not hold your urine  Urinate as soon as needed  Always urinate after you have sex  This helps flush out bacteria passed during sex  · Drink liquids as directed  Ask how much liquid to drink each day and which liquids are best for you  You may need to drink more fluids than usual to help flush bacteria out of your urinary tract  Do not drink caffeine or carbonated liquids   These drinks can irritate your bladder  Your healthcare provider may recommend cranberry juice to help prevent a UTI  · Wipe from front to back after you urinate or have a bowel movement  This will help prevent germs from getting into your urinary tract through your urethra  · Do pelvic muscle exercises often  Pelvic muscle exercises may help you start and stop urinating  Strong pelvic muscles may help you empty your bladder easier  Squeeze these muscles tightly for 5 seconds like you are trying to hold back urine  Then relax for 5 seconds  Gradually work up to squeezing for 10 seconds  Do 3 sets of 15 repetitions a day, or as directed  Follow up with your healthcare provider as directed: You may need to return for more urine tests  Write down your questions so you remember to ask them during your visits  © 2017 [employer] Information is for End User's use only and may not be sold, redistributed or otherwise used for commercial purposes  All illustrations and images included in CareNotes® are the copyrighted property of A D A M , Inc  or Adithya Cordero  The above information is an  only  It is not intended as medical advice for individual conditions or treatments  Talk to your doctor, nurse or pharmacist before following any medical regimen to see if it is safe and effective for you

## 2019-07-24 NOTE — UTILIZATION REVIEW
Notification of Inpatient Admission/Inpatient Authorization Request  This is a Notification of Inpatient Admission/Request for Inpatient Authorization for our facility [facility]  Be advised that this patient was admitted to our facility under Inpatient Status  Please contact the Utilization Review Department where the patient is receiving care services for additional admission information  Place of Service Code: 24   Place of Service Name: Inpatient Hospital  Presentation Date & Time: 7/19/2019  3:44 PM  Inpatient Admission Date & Time: 7/19/19 1544  Discharge Date & Time: 7/24/2019 12:02 PM   Discharge Disposition (if discharged): Home/Self Care  Attending Physician: Taniya Crespo [[id number]]  Admission Orders (From admission, onward)    Ordered        07/20/19 1814  Inpatient Admission  Once         07/19/19 1757  Place in Observation  Once             Facility: [address])  [facility] Utilization Review Department  Phone: [phone]; Fax [phone]  [email or web address]  ATTENTION: Please call with any questions or concerns to [phone]  and carefully listen to the prompts so that you are directed to the right person  Send all requests for admission clinical reviews, approved or denied determinations and any other requests to fax [phone]   All voicemails are confidential

## 2019-07-24 NOTE — NURSING NOTE
D/c instructions reviewed  No questions at this time  IV taken out  White and grey masimo piece placed back in cradle

## 2019-07-24 NOTE — PROGRESS NOTES
Progress Note - OB/GYN   Monika Middleton 26 y o  female MRN: [id number]  Unit/Bed#: [facility] 915-01 Encounter: [id number]    Assessment:  26 y o   at 15 weeks and 0 days with asymptomatic pseudomonas bacteriuria on Day 6 of antibiotics, doing well  (HD#6)    Plan:  1  Asymptomatic pseudomonas bacteriuria  - ID consulted: Continue IV cefepime 2g q8 hrs (1 more dose required this morning)  - Will plan for repeat urine cultures in 3 weeks outpatient    2  IUP at 15 weeks: Continue routine obstetric care    3  FEN: Regular diet    4  DVT PPX: SCDs, ambulation    5  Disposition: Planned discharge today after last dose of antibiotic     Subjective/Objective   Subjective:     Pain: no  Tolerating PO: yes  Voiding: yes  Flatus: yes  BM: yes  Ambulating: yes  Chest pain: no  Shortness of breath: no  Leg pain: no  Fevers: no  Chills: no    She is still asymptomatic  No CVA tenderness and no urinary complaints  She denies vaginal bleeding, loss of fluid, cramping  She is excited to go home today  Objective:     Vitals: Blood pressure 115/72, pulse 81, temperature 98 8 °F (37 1 °C), resp  rate 16, height 6' (1 829 m), weight 136 kg (300 lb), last menstrual period 04/10/2019, SpO2 98 %  Physical Exam:     Physical Exam   Constitutional: She appears well-developed and well-nourished  Cardiovascular: Normal rate, regular rhythm and normal heart sounds  Exam reveals no gallop and no friction rub  No murmur heard  Pulmonary/Chest: Effort normal and breath sounds normal  No stridor  No respiratory distress  She has no wheezes  Abdominal: Soft  She exhibits no distension  There is no tenderness  There is no guarding  Skin: Skin is warm and dry  Psychiatric: She has a normal mood and affect  Her behavior is normal    Vitals reviewed        Lab Results   Component Value Date    WBC 9 25 2019    HGB 11 5 2019    HCT 33 8 (L) 2019    MCV 86 2019     2019       Ivis Jarrell MD  07/24/19

## 2019-07-24 NOTE — PLAN OF CARE
Problem: PAIN - ADULT  Goal: Verbalizes/displays adequate comfort level or baseline comfort level  Description  Interventions:  - Encourage patient to monitor pain and request assistance  - Assess pain using appropriate pain scale  - Administer analgesics based on type and severity of pain and evaluate response  - Implement non-pharmacological measures as appropriate and evaluate response  - Consider cultural and social influences on pain and pain management  - Notify physician/advanced practitioner if interventions unsuccessful or patient reports new pain  Outcome: Progressing     Problem: INFECTION - ADULT  Goal: Absence or prevention of progression during hospitalization  Description  INTERVENTIONS:  - Assess and monitor for signs and symptoms of infection  - Monitor lab/diagnostic results  - Monitor all insertion sites, i e  indwelling lines, tubes, and drains  - Monitor endotracheal (as able) and nasal secretions for changes in amount and color  - [place] appropriate cooling/warming therapies per order  - Administer medications as ordered  - Instruct and encourage patient and family to use good hand hygiene technique  - Identify and instruct in appropriate isolation precautions for identified infection/condition  Outcome: Progressing  Goal: Absence of fever/infection during neutropenic period  Description  INTERVENTIONS:  - Monitor WBC  - Implement neutropenic guidelines  Outcome: Progressing     Problem: SAFETY ADULT  Goal: Patient will remain free of falls  Description  INTERVENTIONS:  - Assess patient frequently for physical needs  -  Identify cognitive and physical deficits and behaviors that affect risk of falls    -  [place] fall precautions as indicated by assessment   - Educate patient/family on patient safety including physical limitations  - Instruct patient to call for assistance with activity based on assessment  - Modify environment to reduce risk of injury  - Consider OT/PT consult to assist with strengthening/mobility  Outcome: Progressing  Goal: Maintain or return to baseline ADL function  Description  INTERVENTIONS:  -  Assess patient's ability to carry out ADLs; assess patient's baseline for ADL function and identify physical deficits which impact ability to perform ADLs (bathing, care of mouth/teeth, toileting, grooming, dressing, etc )  - Assess/evaluate cause of self-care deficits   - Assess range of motion  - Assess patient's mobility; develop plan if impaired  - Assess patient's need for assistive devices and provide as appropriate  - Encourage maximum independence but intervene and supervise when necessary  ¯ Involve family in performance of ADLs  ¯ Assess for home care needs following discharge   ¯ Request OT consult to assist with ADL evaluation and planning for discharge  ¯ Provide patient education as appropriate  Outcome: Progressing  Goal: Maintain or return mobility status to optimal level  Description  INTERVENTIONS:  - Assess patient's baseline mobility status (ambulation, transfers, stairs, etc )    - Identify cognitive and physical deficits and behaviors that affect mobility  - Identify mobility aids required to assist with transfers and/or ambulation (gait belt, sit-to-stand, lift, walker, cane, etc )  - [place] fall precautions as indicated by assessment  - Record patient progress and toleration of activity level on Mobility SBAR; progress patient to next Phase/Stage  - Instruct patient to call for assistance with activity based on assessment  - Request Rehabilitation consult to assist with strengthening/weightbearing, etc   Outcome: Progressing     Problem: DISCHARGE PLANNING  Goal: Discharge to home or other facility with appropriate resources  Description  INTERVENTIONS:  - Identify barriers to discharge w/patient and caregiver  - Arrange for needed discharge resources and transportation as appropriate  - Identify discharge learning needs (meds, wound care, etc )  - Arrange for interpretive services to assist at discharge as needed  - Refer to Case Management Department for coordinating discharge planning if the patient needs post-hospital services based on physician/advanced practitioner order or complex needs related to functional status, cognitive ability, or social support system  Outcome: Progressing     Problem: Knowledge Deficit  Goal: Patient/family/caregiver demonstrates understanding of disease process, treatment plan, medications, and discharge instructions  Description  Complete learning assessment and assess knowledge base  Interventions:  - Provide teaching at level of understanding  - Provide teaching via preferred learning methods  Outcome: Progressing     Problem: Potential for Falls  Goal: Patient will remain free of falls  Description  INTERVENTIONS:  - Assess patient frequently for physical needs  -  Identify cognitive and physical deficits and behaviors that affect risk of falls  -  [place] fall precautions as indicated by assessment   - Educate patient/family on patient safety including physical limitations  - Instruct patient to call for assistance with activity based on assessment  - Modify environment to reduce risk of injury  - Consider OT/PT consult to assist with strengthening/mobility  Outcome: Progressing     Problem: Nutrition/Hydration-ADULT  Goal: Nutrient/Hydration intake appropriate for improving, restoring or maintaining nutritional needs  Description  Monitor and assess patient's nutrition/hydration status for malnutrition (ex- brittle hair, bruises, dry skin, pale skin and conjunctiva, muscle wasting, smooth red tongue, and disorientation)  Collaborate with interdisciplinary team and initiate plan and interventions as ordered  Monitor patient's weight and dietary intake as ordered or per policy  Utilize nutrition screening tool and intervene per policy   Determine patient's food preferences and provide high-protein, high-caloric foods as appropriate       INTERVENTIONS:  - Monitor oral intake, urinary output, labs, and treatment plans  - Assess nutrition and hydration status and recommend course of action  - Evaluate amount of meals eaten  - Assist patient with eating if necessary   - Allow adequate time for meals  - Recommend/ encourage appropriate diets, oral nutritional supplements, and vitamin/mineral supplements  - Order, calculate, and assess calorie counts as needed  - Recommend, monitor, and adjust tube feedings and TPN/PPN based on assessed needs  - Assess need for intravenous fluids  - Provide specific nutrition/hydration education as appropriate  - Include patient/family/caregiver in decisions related to nutrition  Outcome: Progressing

## 2019-08-07 ENCOUNTER — ROUTINE PRENATAL (OUTPATIENT)
Dept: OBGYN CLINIC | Facility: CLINIC | Age: 29
End: 2019-08-07

## 2019-08-07 ENCOUNTER — TRANSCRIBE ORDERS (OUTPATIENT)
Dept: LAB | Facility: CLINIC | Age: 29
End: 2019-08-07

## 2019-08-07 ENCOUNTER — APPOINTMENT (OUTPATIENT)
Dept: LAB | Facility: CLINIC | Age: 29
End: 2019-08-07
Payer: COMMERCIAL

## 2019-08-07 VITALS — SYSTOLIC BLOOD PRESSURE: 120 MMHG | DIASTOLIC BLOOD PRESSURE: 78 MMHG | BODY MASS INDEX: 40.28 KG/M2 | WEIGHT: 293 LBS

## 2019-08-07 DIAGNOSIS — Z36.9 UNSPECIFIED ANTENATAL SCREENING: ICD-10-CM

## 2019-08-07 DIAGNOSIS — Z33.1 PREGNANT STATE, INCIDENTAL: ICD-10-CM

## 2019-08-07 DIAGNOSIS — Z33.1 PREGNANT STATE, INCIDENTAL: Primary | ICD-10-CM

## 2019-08-07 DIAGNOSIS — Z34.82 PRENATAL CARE, SUBSEQUENT PREGNANCY, SECOND TRIMESTER: Primary | ICD-10-CM

## 2019-08-07 PROCEDURE — 36415 COLL VENOUS BLD VENIPUNCTURE: CPT

## 2019-08-07 PROCEDURE — PNV: Performed by: PHYSICIAN ASSISTANT

## 2019-08-07 NOTE — PROGRESS NOTES
Visit: Good FM, No N/V, HA, cramping, VB, LOF, edema, domestic violence, or smoking  Tolerating PNV  Recently admitted for IV antibiotics secondary to UTI with Pseudomonas  Patient asymptomatic  Having repeat clean catch in 3 weeks  Continue routine prenatal care  Return to office in 4 weeks for ob check

## 2019-08-08 LAB — SCAN RESULT: NORMAL

## 2019-08-12 ENCOUNTER — TELEPHONE (OUTPATIENT)
Dept: PERINATAL CARE | Facility: CLINIC | Age: 29
End: 2019-08-12

## 2019-08-12 NOTE — TELEPHONE ENCOUNTER
----- Message from Wojciech Horta MD sent at 8/12/2019  1:18 PM EDT -----  I have reviewed the results which are low risk  Please call patient and notify her of reassuring results if she has not viewed on [employer]  Thank you

## 2019-08-21 ENCOUNTER — APPOINTMENT (OUTPATIENT)
Dept: LAB | Facility: CLINIC | Age: 29
End: 2019-08-21
Payer: COMMERCIAL

## 2019-08-21 DIAGNOSIS — N30.00 ACUTE CYSTITIS WITHOUT HEMATURIA: ICD-10-CM

## 2019-08-21 LAB
BACTERIA UR QL AUTO: ABNORMAL /HPF
BILIRUB UR QL STRIP: NEGATIVE
CLARITY UR: CLEAR
COLOR UR: YELLOW
GLUCOSE UR STRIP-MCNC: NEGATIVE MG/DL
HGB UR QL STRIP.AUTO: ABNORMAL
KETONES UR STRIP-MCNC: NEGATIVE MG/DL
LEUKOCYTE ESTERASE UR QL STRIP: NEGATIVE
NITRITE UR QL STRIP: NEGATIVE
NON-SQ EPI CELLS URNS QL MICRO: ABNORMAL /HPF
PH UR STRIP.AUTO: 5.5 [PH]
PROT UR STRIP-MCNC: NEGATIVE MG/DL
RBC #/AREA URNS AUTO: ABNORMAL /HPF
SP GR UR STRIP.AUTO: 1.01 (ref 1–1.03)
UROBILINOGEN UR QL STRIP.AUTO: 0.2 E.U./DL
WBC #/AREA URNS AUTO: ABNORMAL /HPF

## 2019-08-21 PROCEDURE — 81001 URINALYSIS AUTO W/SCOPE: CPT

## 2019-08-21 PROCEDURE — 87086 URINE CULTURE/COLONY COUNT: CPT

## 2019-08-22 LAB — BACTERIA UR CULT: NORMAL

## 2019-08-29 ENCOUNTER — ROUTINE PRENATAL (OUTPATIENT)
Dept: PERINATAL CARE | Facility: CLINIC | Age: 29
End: 2019-08-29
Payer: COMMERCIAL

## 2019-08-29 VITALS
WEIGHT: 293 LBS | SYSTOLIC BLOOD PRESSURE: 154 MMHG | BODY MASS INDEX: 39.68 KG/M2 | HEART RATE: 86 BPM | HEIGHT: 72 IN | DIASTOLIC BLOOD PRESSURE: 86 MMHG

## 2019-08-29 DIAGNOSIS — O34.211 MATERNAL CARE DUE TO LOW TRANSVERSE UTERINE SCAR FROM PREVIOUS CESAREAN DELIVERY: ICD-10-CM

## 2019-08-29 DIAGNOSIS — Z3A.20 20 WEEKS GESTATION OF PREGNANCY: ICD-10-CM

## 2019-08-29 DIAGNOSIS — Z34.82 PRENATAL CARE, SUBSEQUENT PREGNANCY, SECOND TRIMESTER: Primary | ICD-10-CM

## 2019-08-29 DIAGNOSIS — O99.210 OBESITY AFFECTING PREGNANCY, ANTEPARTUM: ICD-10-CM

## 2019-08-29 PROCEDURE — 76811 OB US DETAILED SNGL FETUS: CPT | Performed by: OBSTETRICS & GYNECOLOGY

## 2019-08-29 PROCEDURE — 76817 TRANSVAGINAL US OBSTETRIC: CPT | Performed by: OBSTETRICS & GYNECOLOGY

## 2019-08-29 NOTE — PROGRESS NOTES
A transvaginal ultrasound was performed  Sonographer note on use of High Level Disinfection Process (Trophon) for transvaginal probe# 5 used, serial [id number]    Maricarmen Dasilva RDMS

## 2019-09-02 PROBLEM — Z3A.20 20 WEEKS GESTATION OF PREGNANCY: Status: ACTIVE | Noted: 2019-07-10

## 2019-09-04 ENCOUNTER — ROUTINE PRENATAL (OUTPATIENT)
Dept: OBGYN CLINIC | Facility: CLINIC | Age: 29
End: 2019-09-04

## 2019-09-04 VITALS
SYSTOLIC BLOOD PRESSURE: 128 MMHG | BODY MASS INDEX: 39.68 KG/M2 | HEIGHT: 72 IN | DIASTOLIC BLOOD PRESSURE: 82 MMHG | WEIGHT: 293 LBS

## 2019-09-04 DIAGNOSIS — B96.5 PSEUDOMONAS URINARY TRACT INFECTION: ICD-10-CM

## 2019-09-04 DIAGNOSIS — N39.0 PSEUDOMONAS URINARY TRACT INFECTION: ICD-10-CM

## 2019-09-04 DIAGNOSIS — Z3A.20 20 WEEKS GESTATION OF PREGNANCY: ICD-10-CM

## 2019-09-04 PROCEDURE — PNV: Performed by: PHYSICIAN ASSISTANT

## 2019-09-04 NOTE — PROGRESS NOTES
Pt feels well  Was on road trip to [place] this past week and had some increase in edema which has somewhat subsided  Advised fluids, rest, watching Na intake also  Had level 2 with some missed anaotmy so she has f/u planned in the end of the month  No n/v/ha, no lof/vb  All questions answered

## 2019-09-27 ENCOUNTER — ULTRASOUND (OUTPATIENT)
Dept: PERINATAL CARE | Facility: CLINIC | Age: 29
End: 2019-09-27
Payer: COMMERCIAL

## 2019-09-27 VITALS
HEART RATE: 81 BPM | BODY MASS INDEX: 39.68 KG/M2 | SYSTOLIC BLOOD PRESSURE: 123 MMHG | DIASTOLIC BLOOD PRESSURE: 80 MMHG | HEIGHT: 72 IN | WEIGHT: 293 LBS

## 2019-09-27 DIAGNOSIS — O99.210 OBESITY AFFECTING PREGNANCY, ANTEPARTUM: Primary | ICD-10-CM

## 2019-09-27 DIAGNOSIS — B96.5 PSEUDOMONAS URINARY TRACT INFECTION: ICD-10-CM

## 2019-09-27 DIAGNOSIS — N39.0 PSEUDOMONAS URINARY TRACT INFECTION: ICD-10-CM

## 2019-09-27 DIAGNOSIS — Z3A.24 24 WEEKS GESTATION OF PREGNANCY: ICD-10-CM

## 2019-09-27 PROBLEM — Z98.891 HISTORY OF CESAREAN DELIVERY: Status: ACTIVE | Noted: 2019-09-27

## 2019-09-27 PROBLEM — O34.211 MATERNAL CARE DUE TO LOW TRANSVERSE UTERINE SCAR FROM PREVIOUS CESAREAN DELIVERY: Status: RESOLVED | Noted: 2019-07-10 | Resolved: 2019-09-27

## 2019-09-27 PROBLEM — Z34.81 PRENATAL CARE, SUBSEQUENT PREGNANCY, FIRST TRIMESTER: Status: RESOLVED | Noted: 2019-07-12 | Resolved: 2019-09-27

## 2019-09-27 PROBLEM — O16.9 ELEVATED BLOOD PRESSURE AFFECTING PREGNANCY, ANTEPARTUM: Status: ACTIVE | Noted: 2019-09-27

## 2019-09-27 PROBLEM — Z34.82 PRENATAL CARE, SUBSEQUENT PREGNANCY, SECOND TRIMESTER: Status: RESOLVED | Noted: 2019-08-07 | Resolved: 2019-09-27

## 2019-09-27 PROCEDURE — 76815 OB US LIMITED FETUS(S): CPT | Performed by: OBSTETRICS & GYNECOLOGY

## 2019-09-27 PROCEDURE — 99212 OFFICE O/P EST SF 10 MIN: CPT | Performed by: OBSTETRICS & GYNECOLOGY

## 2019-09-27 NOTE — PROGRESS NOTES
[address]: Ms Maria T Rod was seen today at 24w2d for followup missed anatomy ultrasound  See ultrasound report under "OB Procedures" tab    Please don't hesitate to contact our office with any concerns or questions   -Mark Hernandez MD

## 2019-10-04 ENCOUNTER — ROUTINE PRENATAL (OUTPATIENT)
Dept: OBGYN CLINIC | Facility: CLINIC | Age: 29
End: 2019-10-04

## 2019-10-04 VITALS
BODY MASS INDEX: 39.68 KG/M2 | WEIGHT: 293 LBS | HEIGHT: 72 IN | SYSTOLIC BLOOD PRESSURE: 132 MMHG | DIASTOLIC BLOOD PRESSURE: 78 MMHG

## 2019-10-04 DIAGNOSIS — Z3A.25 PREGNANCY WITH 25 COMPLETED WEEKS GESTATION: Primary | ICD-10-CM

## 2019-10-04 PROCEDURE — PNV: Performed by: OBSTETRICS & GYNECOLOGY

## 2019-10-04 NOTE — PROGRESS NOTES
Patient reports good fm, no n/v, headache, cramping, bleeding, loss of fluid, edema, dom violence, or smoking  sun pnv given slip glucola cbc type and screen, hgsab, hep c ab, rpr, had 2 episodes of lightheadedness, did not seem related to bp/glucose, may have some congestion of pregnancy discussed claritin, watch turning and changing position quickly, call if increased episodes, urine neg/neg return in 4 weeks or sooner as needed   Has pnc follow up

## 2019-10-29 ENCOUNTER — APPOINTMENT (OUTPATIENT)
Dept: LAB | Facility: CLINIC | Age: 29
End: 2019-10-29
Payer: COMMERCIAL

## 2019-10-29 ENCOUNTER — TRANSCRIBE ORDERS (OUTPATIENT)
Dept: LAB | Facility: CLINIC | Age: 29
End: 2019-10-29

## 2019-10-29 DIAGNOSIS — Z3A.25 PREGNANCY WITH 25 COMPLETED WEEKS GESTATION: ICD-10-CM

## 2019-10-29 LAB
ABO GROUP BLD: NORMAL
ALBUMIN SERPL BCP-MCNC: 2.9 G/DL (ref 3.5–5)
ALP SERPL-CCNC: 58 U/L (ref 46–116)
ALT SERPL W P-5'-P-CCNC: 18 U/L (ref 12–78)
ANION GAP SERPL CALCULATED.3IONS-SCNC: 12 MMOL/L (ref 4–13)
AST SERPL W P-5'-P-CCNC: 14 U/L (ref 5–45)
BASOPHILS # BLD AUTO: 0.02 THOUSANDS/ΜL (ref 0–0.1)
BASOPHILS NFR BLD AUTO: 0 % (ref 0–1)
BILIRUB SERPL-MCNC: 0.4 MG/DL (ref 0.2–1)
BLD GP AB SCN SERPL QL: NEGATIVE
BUN SERPL-MCNC: 6 MG/DL (ref 5–25)
CALCIUM SERPL-MCNC: 8.5 MG/DL (ref 8.3–10.1)
CHLORIDE SERPL-SCNC: 104 MMOL/L (ref 100–108)
CO2 SERPL-SCNC: 21 MMOL/L (ref 21–32)
CREAT SERPL-MCNC: 0.63 MG/DL (ref 0.6–1.3)
EOSINOPHIL # BLD AUTO: 0.07 THOUSAND/ΜL (ref 0–0.61)
EOSINOPHIL NFR BLD AUTO: 1 % (ref 0–6)
ERYTHROCYTE [DISTWIDTH] IN BLOOD BY AUTOMATED COUNT: 12.6 % (ref 11.6–15.1)
GFR SERPL CREATININE-BSD FRML MDRD: 121 ML/MIN/1.73SQ M
GLUCOSE 1H P 50 G GLC PO SERPL-MCNC: 163 MG/DL
GLUCOSE SERPL-MCNC: 165 MG/DL (ref 65–140)
HBV SURFACE AG SER QL: NORMAL
HCT VFR BLD AUTO: 37 % (ref 34.8–46.1)
HCV AB SER QL: NORMAL
HGB BLD-MCNC: 12.4 G/DL (ref 11.5–15.4)
IMM GRANULOCYTES # BLD AUTO: 0.04 THOUSAND/UL (ref 0–0.2)
IMM GRANULOCYTES NFR BLD AUTO: 1 % (ref 0–2)
LYMPHOCYTES # BLD AUTO: 1.09 THOUSANDS/ΜL (ref 0.6–4.47)
LYMPHOCYTES NFR BLD AUTO: 19 % (ref 14–44)
MCH RBC QN AUTO: 29.7 PG (ref 26.8–34.3)
MCHC RBC AUTO-ENTMCNC: 33.5 G/DL (ref 31.4–37.4)
MCV RBC AUTO: 89 FL (ref 82–98)
MONOCYTES # BLD AUTO: 0.27 THOUSAND/ΜL (ref 0.17–1.22)
MONOCYTES NFR BLD AUTO: 5 % (ref 4–12)
NEUTROPHILS # BLD AUTO: 4.14 THOUSANDS/ΜL (ref 1.85–7.62)
NEUTS SEG NFR BLD AUTO: 74 % (ref 43–75)
NRBC BLD AUTO-RTO: 0 /100 WBCS
PLATELET # BLD AUTO: 185 THOUSANDS/UL (ref 149–390)
PMV BLD AUTO: 11.6 FL (ref 8.9–12.7)
POTASSIUM SERPL-SCNC: 3.6 MMOL/L (ref 3.5–5.3)
PROT SERPL-MCNC: 6.9 G/DL (ref 6.4–8.2)
RBC # BLD AUTO: 4.18 MILLION/UL (ref 3.81–5.12)
RH BLD: POSITIVE
RPR SER QL: NORMAL
SODIUM SERPL-SCNC: 137 MMOL/L (ref 136–145)
SPECIMEN EXPIRATION DATE: NORMAL
WBC # BLD AUTO: 5.63 THOUSAND/UL (ref 4.31–10.16)

## 2019-10-29 PROCEDURE — 86850 RBC ANTIBODY SCREEN: CPT

## 2019-10-29 PROCEDURE — 80053 COMPREHEN METABOLIC PANEL: CPT

## 2019-10-29 PROCEDURE — 86592 SYPHILIS TEST NON-TREP QUAL: CPT

## 2019-10-29 PROCEDURE — 85025 COMPLETE CBC W/AUTO DIFF WBC: CPT

## 2019-10-29 PROCEDURE — 36415 COLL VENOUS BLD VENIPUNCTURE: CPT

## 2019-10-29 PROCEDURE — 86803 HEPATITIS C AB TEST: CPT

## 2019-10-29 PROCEDURE — 86901 BLOOD TYPING SEROLOGIC RH(D): CPT

## 2019-10-29 PROCEDURE — 82950 GLUCOSE TEST: CPT

## 2019-10-29 PROCEDURE — 87340 HEPATITIS B SURFACE AG IA: CPT

## 2019-10-29 PROCEDURE — 86900 BLOOD TYPING SEROLOGIC ABO: CPT

## 2019-10-30 DIAGNOSIS — O99.810 ABNORMAL GLUCOSE TOLERANCE TEST (GTT) DURING PREGNANCY, ANTEPARTUM: Primary | ICD-10-CM

## 2019-10-31 ENCOUNTER — ROUTINE PRENATAL (OUTPATIENT)
Dept: OBGYN CLINIC | Facility: CLINIC | Age: 29
End: 2019-10-31

## 2019-10-31 VITALS — BODY MASS INDEX: 39.87 KG/M2 | WEIGHT: 293 LBS | SYSTOLIC BLOOD PRESSURE: 130 MMHG | DIASTOLIC BLOOD PRESSURE: 82 MMHG

## 2019-10-31 DIAGNOSIS — Z34.83 PRENATAL CARE, SUBSEQUENT PREGNANCY, THIRD TRIMESTER: Primary | ICD-10-CM

## 2019-10-31 PROCEDURE — PNV: Performed by: PHYSICIAN ASSISTANT

## 2019-10-31 NOTE — PROGRESS NOTES
Visit: Good Fm, No N/V, HA, cramping, VB, LOF, edema, domestic violence, or smoking  Tolerating PNV  Patient failed 1hr GTT, has script for 3 hr GTT  30 wk packet given today  Continue routine prenatal care  Return to office in 2 weeks for ob check

## 2019-11-04 ENCOUNTER — APPOINTMENT (OUTPATIENT)
Dept: LAB | Facility: CLINIC | Age: 29
End: 2019-11-04
Payer: COMMERCIAL

## 2019-11-04 ENCOUNTER — TELEPHONE (OUTPATIENT)
Dept: OBGYN CLINIC | Facility: CLINIC | Age: 29
End: 2019-11-04

## 2019-11-04 ENCOUNTER — TRANSCRIBE ORDERS (OUTPATIENT)
Dept: LAB | Facility: CLINIC | Age: 29
End: 2019-11-04

## 2019-11-04 DIAGNOSIS — O99.810 ABNORMAL GLUCOSE TOLERANCE TEST (GTT) DURING PREGNANCY, ANTEPARTUM: ICD-10-CM

## 2019-11-04 LAB
GLUCOSE 1H P 100 G GLC PO SERPL-MCNC: 169 MG/DL (ref 65–179)
GLUCOSE 2H P 100 G GLC PO SERPL-MCNC: 102 MG/DL (ref 65–154)
GLUCOSE 3H P 100 G GLC PO SERPL-MCNC: 72 MG/DL (ref 65–139)
GLUCOSE P FAST SERPL-MCNC: 92 MG/DL (ref 65–99)

## 2019-11-04 PROCEDURE — 36415 COLL VENOUS BLD VENIPUNCTURE: CPT

## 2019-11-04 PROCEDURE — 82951 GLUCOSE TOLERANCE TEST (GTT): CPT

## 2019-11-04 PROCEDURE — 82952 GTT-ADDED SAMPLES: CPT

## 2019-11-07 ENCOUNTER — ULTRASOUND (OUTPATIENT)
Dept: PERINATAL CARE | Facility: CLINIC | Age: 29
End: 2019-11-07
Payer: COMMERCIAL

## 2019-11-07 VITALS
BODY MASS INDEX: 39.68 KG/M2 | DIASTOLIC BLOOD PRESSURE: 86 MMHG | SYSTOLIC BLOOD PRESSURE: 120 MMHG | HEART RATE: 75 BPM | HEIGHT: 72 IN | WEIGHT: 293 LBS

## 2019-11-07 DIAGNOSIS — E66.01 MATERNAL MORBID OBESITY IN THIRD TRIMESTER, ANTEPARTUM (HCC): Primary | ICD-10-CM

## 2019-11-07 DIAGNOSIS — Z3A.30 30 WEEKS GESTATION OF PREGNANCY: ICD-10-CM

## 2019-11-07 DIAGNOSIS — O99.213 MATERNAL MORBID OBESITY IN THIRD TRIMESTER, ANTEPARTUM (HCC): Primary | ICD-10-CM

## 2019-11-07 PROCEDURE — 76816 OB US FOLLOW-UP PER FETUS: CPT | Performed by: OBSTETRICS & GYNECOLOGY

## 2019-11-07 PROCEDURE — 99212 OFFICE O/P EST SF 10 MIN: CPT | Performed by: OBSTETRICS & GYNECOLOGY

## 2019-11-07 NOTE — PATIENT INSTRUCTIONS
Kick Counts in Pregnancy   WHAT YOU NEED TO KNOW:   Kick counts measure how much your baby is moving in your womb  A kick from your baby can be felt as a twist, turn, swish, roll, or jab  It is common to feel your baby kicking at 26 to 28 weeks of pregnancy  You may feel your baby kick as early as 20 weeks of pregnancy  DISCHARGE INSTRUCTIONS:   Return to the emergency department if:   · You feel your baby kick less as the day goes on      · You do not feel any kicks in a day  Contact your healthcare provider if:   · You feel a change in the number of kicks or movements of your baby  · You feel fewer than 10 kicks within 2 hours after counting twice  · You have questions or concerns about your baby's movements  Why measure kick counts:  Your baby's movement may provide information about your baby's health  He may move less, or not at all, if there are problems  He may move less if he does not have enough room to grow in your uterus (womb)  He may also move less if he is not getting enough oxygen or nutrition from the placenta  Tell your healthcare provider as soon as you feel a change in your baby's movements  Problems that are found earlier are easier to treat  When to measure kick counts:   · Measure kick counts at the same time every day  · Measure kick counts when your baby is awake and most active  Your baby may be most active in the evening  · Measure kick counts after a meal or snack  Your baby may be more active after you eat  Wait 2 hours after you drink liquids that contain caffeine  Caffeine can make your baby more active than usual     · You should not smoke while you are pregnant  Smoking increases the risk of health problems for you and for your baby during your pregnancy  If you do smoke, wait 2 hours to measure kick counts  Nicotine can make your baby more active than usual   How to measure kick counts:  Check that your baby is awake before you measure kick counts   You can wake up your baby by lightly pushing on your belly, walking, or drinking something cold  Your healthcare provider may tell you different ways to measure kick counts  He may tell you to do the following:  · Use a chart or clock to keep track of the time you start and finish counting  · Sit in a chair or lie on your left side  · Place your hands on the largest part of your belly  · Count until you reach 10 kicks  Write down how much time it takes to count 10 kicks  · It may take 30 minutes to 2 hours to count 10 kicks  It should not take more than 2 hours to count 10 kicks  · If you do not feel 10 kicks within 2 hours, wait 1 hour and count again  Your baby can sleep for up to 40 minutes at one time  Follow up with your healthcare provider as directed:  Write down your questions so you remember to ask them during your visits  © 2017 [employer] Information is for End User's use only and may not be sold, redistributed or otherwise used for commercial purposes  All illustrations and images included in CareNotes® are the copyrighted property of [employer]  or Adithya Cordero  The above information is an  only  It is not intended as medical advice for individual conditions or treatments  Talk to your doctor, nurse or pharmacist before following any medical regimen to see if it is safe and effective for you

## 2019-11-07 NOTE — LETTER
November 7, 2019     Han Goldberg MD  [address]    Patient: Sheila Shaw   YOB: 1990   Date of Visit: 11/7/2019       Dear Dr Kiara Torres: Thank you for referring Sheila Shaw to me for evaluation  Below are my notes for this consultation  If you have questions, please do not hesitate to call me  I look forward to following your patient along with you  Sincerely,        Tilman Opitz, MD        CC: No Recipients  Tilman Opitz, MD  11/7/2019  3:36 PM  Sign at close encounter  Please refer to the [facility] ultrasound report in Ob Procedures for additional information regarding the visit to the [facility]  today

## 2019-11-07 NOTE — PROGRESS NOTES
Please refer to the [facility] ultrasound report in Ob Procedures for additional information regarding the visit to the [facility]  today

## 2019-11-12 ENCOUNTER — TELEPHONE (OUTPATIENT)
Dept: OBGYN CLINIC | Facility: CLINIC | Age: 29
End: 2019-11-12

## 2019-11-12 NOTE — TELEPHONE ENCOUNTER
Patients mother damaso Stoner Bon called letting us know she was feeling under the weather with a head cold/ congestion/ sore throat  Reviewed approved medications

## 2019-11-15 ENCOUNTER — ROUTINE PRENATAL (OUTPATIENT)
Dept: OBGYN CLINIC | Facility: CLINIC | Age: 29
End: 2019-11-15
Payer: COMMERCIAL

## 2019-11-15 VITALS
BODY MASS INDEX: 39.68 KG/M2 | SYSTOLIC BLOOD PRESSURE: 136 MMHG | HEIGHT: 72 IN | WEIGHT: 293 LBS | DIASTOLIC BLOOD PRESSURE: 84 MMHG

## 2019-11-15 DIAGNOSIS — Z23 NEED FOR TDAP VACCINATION: ICD-10-CM

## 2019-11-15 DIAGNOSIS — Z3A.31 PREGNANCY WITH 31 COMPLETED WEEKS GESTATION: Primary | ICD-10-CM

## 2019-11-15 PROCEDURE — PNV: Performed by: OBSTETRICS & GYNECOLOGY

## 2019-11-15 PROCEDURE — 90471 IMMUNIZATION ADMIN: CPT

## 2019-11-15 PROCEDURE — 90715 TDAP VACCINE 7 YRS/> IM: CPT

## 2019-11-15 RX ORDER — CALCIUM CARBONATE 200(500)MG
750 TABLET,CHEWABLE ORAL DAILY
COMMUNITY
End: 2020-01-04 | Stop reason: HOSPADM

## 2019-11-15 NOTE — PROGRESS NOTES
Patient reports good fm, no n/v, cramping, bleeding, loss of fluid, edema, dom violence, or smoking  sun pnv some sinus symptoms reviewed nasal rinse and claritin, a lot of reflux also advised meds, urine neg/neg return in 2 weeks or sooner as needed getting tdap today  Has pnc follow up, was breech and on bigger side

## 2019-11-29 ENCOUNTER — ROUTINE PRENATAL (OUTPATIENT)
Dept: OBGYN CLINIC | Facility: CLINIC | Age: 29
End: 2019-11-29

## 2019-11-29 VITALS — BODY MASS INDEX: 40.82 KG/M2 | SYSTOLIC BLOOD PRESSURE: 128 MMHG | DIASTOLIC BLOOD PRESSURE: 84 MMHG | WEIGHT: 293 LBS

## 2019-11-29 DIAGNOSIS — Z34.83 PRENATAL CARE, SUBSEQUENT PREGNANCY, THIRD TRIMESTER: Primary | ICD-10-CM

## 2019-11-29 PROCEDURE — PNV: Performed by: PHYSICIAN ASSISTANT

## 2019-11-29 RX ORDER — OMEPRAZOLE 20 MG/1
20 TABLET, DELAYED RELEASE ORAL DAILY
COMMUNITY
End: 2020-06-30

## 2019-11-29 NOTE — PROGRESS NOTES
Visit: Good Fm, No N/V, HA, cramping, VB, LOF, edema, domestic violence, or smoking  Tolerating PNV  Patient is planning for repeat , will send task to schedule  Continue routine prenatal care  Return to office in 2 weeks for ob check

## 2019-12-03 ENCOUNTER — TELEPHONE (OUTPATIENT)
Dept: PERINATAL CARE | Facility: CLINIC | Age: 29
End: 2019-12-03

## 2019-12-03 NOTE — TELEPHONE ENCOUNTER
Pt called office today at [phone] questioning if she also had an ultrasound appointment on 12/19/2019 with her NST  Returned patients call and informed her she did have an appointment for both an ultrasound and NST  Pt receptive and declines further needs at this time

## 2019-12-13 ENCOUNTER — ROUTINE PRENATAL (OUTPATIENT)
Dept: OBGYN CLINIC | Facility: CLINIC | Age: 29
End: 2019-12-13

## 2019-12-13 VITALS
BODY MASS INDEX: 39.68 KG/M2 | HEIGHT: 72 IN | DIASTOLIC BLOOD PRESSURE: 84 MMHG | SYSTOLIC BLOOD PRESSURE: 126 MMHG | WEIGHT: 293 LBS

## 2019-12-13 DIAGNOSIS — Z3A.35 PREGNANCY WITH 35 COMPLETED WEEKS GESTATION: Primary | ICD-10-CM

## 2019-12-13 PROCEDURE — PNV: Performed by: OBSTETRICS & GYNECOLOGY

## 2019-12-13 NOTE — PROGRESS NOTES
Patient reports good fm, no n/v, headache, cramping, bleeding, loss of fluid, dom violence, or smoking  sun pnv urine neg/neg has pnc follow up given wash booklet  Return in 1 week or sooner as needed, gbs next visit

## 2019-12-15 NOTE — PROGRESS NOTES
Progress Note - Antepartum  Armando Canales 26 y o  female MRN: [id number]  Unit/Bed#: [facility] 915-01 Encounter: [id number]      Armando Canales has no current complaints and without obstetric complaints  In good spirits while receiving treatment  Without urinary complaints  /80   Pulse 82   Temp 98 6 °F (37 °C)   Resp 20   Ht 6' (1 829 m)   Wt 136 kg (300 lb)   LMP 04/10/2019   SpO2 98%   BMI 40 69 kg/m²     Physical Exam:   Physical Exam   Constitutional: She is oriented to person, place, and time  She appears well-developed and well-nourished  No distress  Cardiovascular: Normal rate, regular rhythm, normal heart sounds and intact distal pulses  Pulmonary/Chest: Effort normal and breath sounds normal  No stridor  No respiratory distress  Abdominal: Soft  Bowel sounds are normal  She exhibits no distension  There is no tenderness  Gravid    Neurological: She is alert and oriented to person, place, and time  Skin: Skin is warm and dry  She is not diaphoretic  Psychiatric: She has a normal mood and affect  Her behavior is normal          FHT:     TOCO:        Lab Results   Component Value Date    WBC 9 25 07/19/2019    HGB 11 5 07/19/2019    HCT 33 8 (L) 07/19/2019     07/19/2019     Lab Results   Component Value Date     09/12/2015    K 3 4 (L) 07/18/2019     07/18/2019    CO2 24 07/18/2019    BUN 6 07/18/2019    CREATININE 0 48 (L) 07/18/2019    GLUCOSE 78 09/12/2015    AST 10 07/18/2019    ALT 17 07/18/2019       A/P: Armando Canales is a 26 y o  [place] Age at 14w3d admitted with pseudomonas urinary tract infection  Currently in stable condition   Hospital Day: 2   1) UTI: continue Cefepime 2 g IV q8hr x 5 days, follow-up ID consultation  2) Routine obstetric care  3) FEN: LR IVF, clear liquid diet  4) DVT PPx: SCDs  5) Encouraged ambulation as tolerated  7) Dispo: inpatient     Aziza Purl, DO - - -

## 2019-12-18 NOTE — PATIENT INSTRUCTIONS
Thank you for choosing us for your  care today  If you have any questions about your ultrasound or care, please do not hesitate to contact us or your primary obstetrician  Please consider purchasing an automated blood pressure cuff to check your blood pressure at home daily  Write down the top number (systolic) and bottom number (diastolic)  This will help your providers follow your blood pressure and decide if any additional testing or monitoring is necessary  A top number (systolic) of at or above 445PBII OR a bottom number (diastolic) of at or above 90 mmHg is considered elevated  Report any elevations to your doctors or call Labor and Delivery  Some general instructions for your pregnancy are:     Exercise: we encourage most pregnant women to get regular physical activity in pregnancy  Exercise has been shown to reduce the risk of several pregnancy-related complications  Unless instructed otherwise, you can aim for 22 minutes per day (150 minutes per week! )   Nutrition: aim for calcium-rich and iron-rich foods as well as healthy sources of protein   Weight: ask your doctor what is the appropriate amount of weight for you to gain in pregnancy  We have nutritionists here if you would like to meet with them   Protection from influenza: get yourself and your entire household vaccinated against influenza  Tell your partner to get vaccinated as well  Good hand hygiene can reduce the spread of this potentially deadly virus  Insist that everyone who is going to hold or be around your baby get vaccinated   Educate yourself about preeclampsia: preeclampsia is a common, serious complication in pregnancy  A blood pressure of 140mmHg (top number or systolic) OR 19USHS (bottom number or diastolic) is elevated and needs evaluation by your doctor  Ask your doctor early in pregnancy if you should take aspirin (not motrin or tylenol) to prevent preeclampsia    If you were advised to take aspirin to prevent preeclampsia, a daily dose of 162mg or 81mg is advised  One resource to learn more is www  preeclampsia org    If you smoke, try to reduce how many cigarettes you smoke or quit completely  Do not vape   Other warning signs to watch out for in pregnancy or postpartum: chest pain, obstructed breathing or shortness of breath, seizures, thoughts of hurting yourself or your baby, bleeding, a painful or swollen leg, fever, or headache ([facility] POST-BIRTH Warning Signs campaign)  If these happen call 911  Itching is also not normal in pregnancy and if you experience this, especially over your hands and feet, potentially worse at night, notify your doctors

## 2019-12-19 ENCOUNTER — ULTRASOUND (OUTPATIENT)
Dept: PERINATAL CARE | Facility: CLINIC | Age: 29
End: 2019-12-19
Payer: COMMERCIAL

## 2019-12-19 ENCOUNTER — APPOINTMENT (OUTPATIENT)
Dept: PERINATAL CARE | Facility: CLINIC | Age: 29
End: 2019-12-19
Payer: COMMERCIAL

## 2019-12-19 ENCOUNTER — HOSPITAL ENCOUNTER (OUTPATIENT)
Facility: HOSPITAL | Age: 29
Discharge: HOME | End: 2019-12-19
Attending: OBSTETRICS & GYNECOLOGY | Admitting: OBSTETRICS & GYNECOLOGY
Payer: COMMERCIAL

## 2019-12-19 VITALS
HEART RATE: 90 BPM | DIASTOLIC BLOOD PRESSURE: 88 MMHG | BODY MASS INDEX: 39.68 KG/M2 | HEIGHT: 72 IN | WEIGHT: 293 LBS | SYSTOLIC BLOOD PRESSURE: 134 MMHG

## 2019-12-19 VITALS
DIASTOLIC BLOOD PRESSURE: 83 MMHG | HEART RATE: 83 BPM | HEIGHT: 72 IN | TEMPERATURE: 98.3 F | RESPIRATION RATE: 18 BRPM | WEIGHT: 293 LBS | SYSTOLIC BLOOD PRESSURE: 134 MMHG | BODY MASS INDEX: 39.68 KG/M2

## 2019-12-19 DIAGNOSIS — Z3A.36 36 WEEKS GESTATION OF PREGNANCY: ICD-10-CM

## 2019-12-19 DIAGNOSIS — O99.210 OBESITY AFFECTING PREGNANCY, ANTEPARTUM: Primary | ICD-10-CM

## 2019-12-19 DIAGNOSIS — B96.5 PSEUDOMONAS URINARY TRACT INFECTION: ICD-10-CM

## 2019-12-19 DIAGNOSIS — N39.0 PSEUDOMONAS URINARY TRACT INFECTION: ICD-10-CM

## 2019-12-19 DIAGNOSIS — IMO0002 EVALUATE ANATOMY NOT SEEN ON PRIOR SONOGRAM: ICD-10-CM

## 2019-12-19 DIAGNOSIS — Z36.89 ENCOUNTER FOR ULTRASOUND TO CHECK FETAL GROWTH: ICD-10-CM

## 2019-12-19 LAB
ALBUMIN SERPL BCP-MCNC: 2.9 G/DL (ref 3.5–5)
ALP SERPL-CCNC: 95 U/L (ref 46–116)
ALT SERPL W P-5'-P-CCNC: 18 U/L (ref 12–78)
ANION GAP SERPL CALCULATED.3IONS-SCNC: 7 MMOL/L (ref 4–13)
AST SERPL W P-5'-P-CCNC: 13 U/L (ref 5–45)
BILIRUB SERPL-MCNC: 0.42 MG/DL (ref 0.2–1)
BUN SERPL-MCNC: 11 MG/DL (ref 5–25)
CALCIUM SERPL-MCNC: 9.4 MG/DL (ref 8.3–10.1)
CHLORIDE SERPL-SCNC: 105 MMOL/L (ref 100–108)
CO2 SERPL-SCNC: 26 MMOL/L (ref 21–32)
CREAT SERPL-MCNC: 0.66 MG/DL (ref 0.6–1.3)
CREAT UR-MCNC: 74.3 MG/DL
ERYTHROCYTE [DISTWIDTH] IN BLOOD BY AUTOMATED COUNT: 12.7 % (ref 11.6–15.1)
GFR SERPL CREATININE-BSD FRML MDRD: 119 ML/MIN/1.73SQ M
GLUCOSE SERPL-MCNC: 79 MG/DL (ref 65–140)
HCT VFR BLD AUTO: 36.8 % (ref 34.8–46.1)
HGB BLD-MCNC: 12.4 G/DL (ref 11.5–15.4)
MCH RBC QN AUTO: 29.1 PG (ref 26.8–34.3)
MCHC RBC AUTO-ENTMCNC: 33.7 G/DL (ref 31.4–37.4)
MCV RBC AUTO: 86 FL (ref 82–98)
PLATELET # BLD AUTO: 209 THOUSANDS/UL (ref 149–390)
PMV BLD AUTO: 12.6 FL (ref 8.9–12.7)
POTASSIUM SERPL-SCNC: 3.4 MMOL/L (ref 3.5–5.3)
PROT SERPL-MCNC: 7.1 G/DL (ref 6.4–8.2)
PROT UR-MCNC: 7 MG/DL
PROT/CREAT UR: 0.09 MG/G{CREAT} (ref 0–0.1)
RBC # BLD AUTO: 4.26 MILLION/UL (ref 3.81–5.12)
SODIUM SERPL-SCNC: 138 MMOL/L (ref 136–145)
WBC # BLD AUTO: 8.86 THOUSAND/UL (ref 4.31–10.16)

## 2019-12-19 PROCEDURE — 59025 FETAL NON-STRESS TEST: CPT | Performed by: OBSTETRICS & GYNECOLOGY

## 2019-12-19 PROCEDURE — 76816 OB US FOLLOW-UP PER FETUS: CPT | Performed by: OBSTETRICS & GYNECOLOGY

## 2019-12-19 PROCEDURE — 80053 COMPREHEN METABOLIC PANEL: CPT | Performed by: OBSTETRICS & GYNECOLOGY

## 2019-12-19 PROCEDURE — 85027 COMPLETE CBC AUTOMATED: CPT | Performed by: OBSTETRICS & GYNECOLOGY

## 2019-12-19 PROCEDURE — 99214 OFFICE O/P EST MOD 30 MIN: CPT

## 2019-12-19 PROCEDURE — 99212 OFFICE O/P EST SF 10 MIN: CPT | Performed by: OBSTETRICS & GYNECOLOGY

## 2019-12-19 PROCEDURE — NC001 PR NO CHARGE: Performed by: OBSTETRICS & GYNECOLOGY

## 2019-12-19 PROCEDURE — 84156 ASSAY OF PROTEIN URINE: CPT | Performed by: OBSTETRICS & GYNECOLOGY

## 2019-12-19 PROCEDURE — 82570 ASSAY OF URINE CREATININE: CPT | Performed by: OBSTETRICS & GYNECOLOGY

## 2019-12-19 NOTE — PROGRESS NOTES
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 MRN: [id number]  Unit/Bed#: LD Triage 2 Encounter: [id number]      Assessment/Plan:  26 y o  Kim Paulino at 36w1d with gestational hypertension vs pre-eclampsia without severe features  She initailly presented for extended monitoring after she was noted to have a deceleration on routine NST at [facility]  Preceding growth scan was normal, notable for AC>95%, BARNEY appropriate  Extended monitoring was reactive for several hours, with low baseline around 110, but she was noted to have elevated blood pressures and meet criteria for gestational hypertension, with blood pressures above 140/90 multiple times, without severe range blood pressures, lab abnormalities, or proteinuria (PC ratio 0 09)  She feels well  - Stable at this time for discharge home, discussed signs and symptoms of early labor as well as signs and symptoms of pre-eclampsia  Patient expressed understanding   - Consider changing date of  delivery to 37 weeks given new diagnosis of gestational hypertension  ______________________________________________________________________      Chief Compliant: "They sent me over to be monitored after my baby's heart rate had a dip"    Subjective:  26 y o  Kim Paulino at 36w1d who presents for extended monitoring after was noted to have a deceleration on routine NST at [facility]  Preceding growth scan showed growth at 88%, 7lbs 6oz, with AC >95%  BARNEY 19 6  She was also noted to have an elevated blood pressure 134/88    She feels well, has no concerns at this time, apart from stress at work today  She denies contractions, leakage of fluid, vaginal bleeding, or decreased fetal movement  She denies headache, visual changes, RUQ pain, or LE swelling   course is notable for elevated blood pressure at 20 weeks of 154/86  Noted to have blood pressure of 140/93 soon after presentation to triage         Objective:    Vitals:    19 2108 12/19/19 2121 12/19/19 2138 12/19/19 2150   BP: 134/80 133/82  134/83   Pulse: 70 79 83 83   Resp:       Temp:       TempSrc:       Weight:       Height:           FHT:  110 / Moderate 6 - 25 bpm / accelerations present, no decelerations  [place]: none    Lab Results   Component Value Date    WBC 8 86 12/19/2019    HGB 12 4 12/19/2019    HCT 36 8 12/19/2019    MCV 86 12/19/2019     12/19/2019     Lab Results   Component Value Date     09/12/2015    SODIUM 138 12/19/2019    K 3 4 (L) 12/19/2019     12/19/2019    CO2 26 12/19/2019    ANIONGAP 8 09/12/2015    AGAP 7 12/19/2019    BUN 11 12/19/2019    CREATININE 0 66 12/19/2019    GLUC 79 12/19/2019    GLUF 92 11/04/2019    CALCIUM 9 4 12/19/2019    AST 13 12/19/2019    ALT 18 12/19/2019    ALKPHOS 95 12/19/2019    PROT 6 7 09/12/2015    TP 7 1 12/19/2019    BILITOT 0 19 (L) 09/12/2015    TBILI 0 42 12/19/2019    EGFR 119 12/19/2019       Urine protein:creatinine ratio 0 09    Discussed with Dr Lurdes Rodriguez MD  12/19/2019 6:33 PM

## 2019-12-19 NOTE — PROGRESS NOTES
[address]: Ms Kendall Hardy was seen today at 36w1d for fetal growth and followup missed anatomy ultrasound  See ultrasound report under "OB Procedures" tab  NST is to follow  Please don't hesitate to contact our office with any concerns or questions    Waldron Dubin, MD

## 2019-12-19 NOTE — LETTER
NST sleeve cover sheet    Patient name: Kishan Figueroa  : 1990  MRN: [id number]    GISELL: Estimated Date of Delivery: 1/15/20    Obstetrician: ______[facility]_________________________    Reason(s) for testing:  _____________________BMI >40_____________________      Testing frequency:    ___ 2x/wk  __x_ 1x/wk  ___ Dopplers  ___ BPP?       Last growth scan: _______19___________________________________

## 2019-12-20 ENCOUNTER — ROUTINE PRENATAL (OUTPATIENT)
Dept: OBGYN CLINIC | Facility: CLINIC | Age: 29
End: 2019-12-20

## 2019-12-20 VITALS
SYSTOLIC BLOOD PRESSURE: 122 MMHG | DIASTOLIC BLOOD PRESSURE: 84 MMHG | BODY MASS INDEX: 39.68 KG/M2 | WEIGHT: 293 LBS | HEIGHT: 72 IN

## 2019-12-20 DIAGNOSIS — N39.0 PSEUDOMONAS URINARY TRACT INFECTION: ICD-10-CM

## 2019-12-20 DIAGNOSIS — Z34.83 PRENATAL CARE, SUBSEQUENT PREGNANCY, THIRD TRIMESTER: Primary | ICD-10-CM

## 2019-12-20 DIAGNOSIS — B96.5 PSEUDOMONAS URINARY TRACT INFECTION: ICD-10-CM

## 2019-12-20 PROCEDURE — PNV: Performed by: OBSTETRICS & GYNECOLOGY

## 2019-12-20 NOTE — DISCHARGE INSTRUCTIONS
Preeclampsia   WHAT YOU NEED TO KNOW:   What is preeclampsia? Preeclampsia is a condition that can develop during week 20 or later of your pregnancy  Preeclampsia means you have high blood pressure and may have protein in your urine  Preeclampsia can cause mild to life-threatening health problems for you and your unborn baby  What are the signs and symptoms of preeclampsia? You may not have any symptoms  Severe preeclampsia may cause any of the following symptoms:  · Swollen face and hands    · A sudden weight gain of 5 pounds or more    · Headache    · Spotted or blurred vision     · Pain in your upper abdomen  What increases my risk for preeclampsia? · First pregnancy    · Pregnant with twins or multiples    · Personal or family history of preeclampsia or eclampsia    · Overweight    · Diabetes, high blood pressure, or kidney disease    · Age older than 40 years  How is preeclampsia diagnosed? · A blood pressure  of 140/90 mmHg or more for at least 2 readings may mean you have preeclampsia  Your blood pressure will need to be checked 1 to 2 times a week until your baby is born  · Blood tests  are done to check your liver and kidney function  You may need blood tests every week while you are pregnant  · Urine tests  are used to check for protein  You may need to give healthcare providers a urine sample at each visit  You may also need to collect your urine every time you urinate for 24 hours  How will my unborn baby be monitored? You may need to keep track of how often your baby moves or kicks over a certain amount of time  Ask your healthcare provider how to do kick counts and how often to do them  You may also need the following tests at each visit until your baby is born:  · A fetal biophysical profile  combines a nonstress test and an ultrasound of your unborn baby  The nonstress test measures changes in your baby's heartbeat when he moves   The ultrasound will show your baby's movement, growth, and how his breathing muscles are working  Healthcare providers can check the amount of fluid around your baby  The ultrasound will also show how well your baby's lungs are working  · An umbilical cord Doppler  checks blood flow through the umbilical cord  How is preeclampsia treated? · Medicines  may be given to lower your blood pressure, protect your organs, or prevent seizures  Low doses of aspirin after 12 weeks of pregnancy may be recommended if you are at high risk for preeclampsia  Aspirin may help prevent preeclampsia or problems that can happen from preeclampsia  Do not take aspirin unless directed by your healthcare provider  · Rest  as directed  Your healthcare provider may tell you to rest more often if you have mild symptoms of preeclampsia  Lie on your left side as often as you can  You may need complete bedrest if you have more severe symptoms  You may need to be in the hospital if your condition worsens  · Delivery  usually stops preeclampsia  Healthcare providers may deliver your baby right away if he is full-term (37 weeks or more)  He may need to be delivered early if you or the baby has life-threatening symptoms  What are the risks of preeclampsia? Your baby may not grow as he should and may need to be delivered early  Placental abruption can occur if the placenta pulls away from the uterus too soon  This condition is life-threatening for your baby  High blood pressure that is not controlled can lead to blood clots, kidney or liver failure, or stroke  Severe preeclampsia can cause seizures or coma  This condition is called eclampsia  Eclampsia is a life-threatening condition for you and your unborn baby  Call 911 for any of the following:   · You have a seizure  · You have severe abdominal pain with nausea and vomiting  When should I seek immediate care? · You develop a severe headache that does not go away      · You have blurred or spotted vision that does not go away     · You are bleeding from your vagina  · You have new or increased swelling in your face or hands  · You are urinating little or not at all  When should I contact my healthcare provider? · You are urinating less than usual      · You do not feel your baby's movements as often as usual     · You have questions or concerns about your condition or care  CARE AGREEMENT:   You have the right to help plan your care  Learn about your health condition and how it may be treated  Discuss treatment options with your caregivers to decide what care you want to receive  You always have the right to refuse treatment  The above information is an  only  It is not intended as medical advice for individual conditions or treatments  Talk to your doctor, nurse or pharmacist before following any medical regimen to see if it is safe and effective for you  © 2017 [employer] [employer] Information is for End User's use only and may not be sold, redistributed or otherwise used for commercial purposes  All illustrations and images included in CareNotes® are the copyrighted property of A D A M , Inc  or Adithya Cordero

## 2019-12-20 NOTE — PROGRESS NOTES
After reviewing NST (which occurred after ultrasound) advised evaluation on L&D for extended monitoring given variable deceleration and contractions    Mercedes Ribeiro MD

## 2019-12-23 ENCOUNTER — ULTRASOUND (OUTPATIENT)
Dept: PERINATAL CARE | Facility: CLINIC | Age: 29
End: 2019-12-23
Payer: COMMERCIAL

## 2019-12-23 VITALS
SYSTOLIC BLOOD PRESSURE: 134 MMHG | HEIGHT: 72 IN | BODY MASS INDEX: 39.68 KG/M2 | HEART RATE: 80 BPM | DIASTOLIC BLOOD PRESSURE: 70 MMHG | WEIGHT: 293 LBS

## 2019-12-23 DIAGNOSIS — E66.01 MATERNAL MORBID OBESITY IN THIRD TRIMESTER, ANTEPARTUM (HCC): ICD-10-CM

## 2019-12-23 DIAGNOSIS — Z3A.36 36 WEEKS GESTATION OF PREGNANCY: Primary | ICD-10-CM

## 2019-12-23 DIAGNOSIS — O99.213 MATERNAL MORBID OBESITY IN THIRD TRIMESTER, ANTEPARTUM (HCC): ICD-10-CM

## 2019-12-23 LAB — EXTERNAL GROUP B STREP ANTIGEN: NEGATIVE

## 2019-12-23 PROCEDURE — 76815 OB US LIMITED FETUS(S): CPT | Performed by: OBSTETRICS & GYNECOLOGY

## 2019-12-23 PROCEDURE — 59025 FETAL NON-STRESS TEST: CPT | Performed by: OBSTETRICS & GYNECOLOGY

## 2019-12-23 NOTE — PROGRESS NOTES
Please refer to the [facility] ultrasound report in Ob Procedures for additional information regarding the visit to the [facility]  today

## 2019-12-23 NOTE — PATIENT INSTRUCTIONS
Nonstress Test for Pregnancy   WHAT YOU NEED TO KNOW:   What do I need to know about a nonstress test?  A nonstress test measures your baby's heart rate and movements  Nonstress means that no stress will be placed on your baby during the test    How do I prepare for a nonstress test?  Your healthcare provider will talk to you about how to prepare for this test  He may tell you to eat and drink plenty of fluids before your test  If you smoke, you may be asked not to smoke within 2 hours before the test  He will also tell you what medicines to take or not take on the day of your test    What will happen during a nonstress test?  You may be asked to lie down or recline back for the test on a bed  One or two belts with sensors will be placed around your abdomen  Your baby's heart rate will be recorded with a machine  If your baby does not move, your baby may be asleep  Your healthcare provider may make a noise near your abdomen to try to wake your baby  The test usually takes about 20 minutes, but can take longer if your baby needs to be awakened  What do I need to know about the test results? Your baby will be expected to move at least twice for a certain amount of time  Your baby's heart rate will be expected to go up by a certain number of beats per minute during movement  If your baby does not move as expected, the test may need to be repeated or you may need other tests  CARE AGREEMENT:   You have the right to help plan your care  Learn about your health condition and how it may be treated  Discuss treatment options with your caregivers to decide what care you want to receive  You always have the right to refuse treatment  The above information is an  only  It is not intended as medical advice for individual conditions or treatments  Talk to your doctor, nurse or pharmacist before following any medical regimen to see if it is safe and effective for you    © 2017 [employer]  Information is for End User's use only and may not be sold, redistributed or otherwise used for commercial purposes  All illustrations and images included in CareNotes® are the copyrighted property of A D A M , Inc  or Adithya Cordero

## 2019-12-23 NOTE — LETTER
How Severe Is Your Skin Lesion?: moderate NST sleeve cover sheet    Patient name: Barbara Alejandre  : 1990  MRN: [id number]    GISELL: Estimated Date of Delivery: 1/15/20    Obstetrician: _Poly/Qi__________    Reason(s) for testing:  _______________Obesity___________________      Testing frequency:    ___ 2x/wk  __X_ 1x/wk  ___ Dopplers  ___ BPP?       Last growth scan: __________________________________________ Has Your Skin Lesion Been Treated?: not been treated Is This A New Presentation, Or A Follow-Up?: Skin Lesion

## 2019-12-26 ENCOUNTER — ROUTINE PRENATAL (OUTPATIENT)
Dept: OBGYN CLINIC | Facility: CLINIC | Age: 29
End: 2019-12-26

## 2019-12-26 VITALS
SYSTOLIC BLOOD PRESSURE: 128 MMHG | HEIGHT: 72 IN | WEIGHT: 293 LBS | DIASTOLIC BLOOD PRESSURE: 86 MMHG | BODY MASS INDEX: 39.68 KG/M2

## 2019-12-26 DIAGNOSIS — Z34.83 PRENATAL CARE, SUBSEQUENT PREGNANCY, THIRD TRIMESTER: Primary | ICD-10-CM

## 2019-12-26 PROCEDURE — PNV: Performed by: PHYSICIAN ASSISTANT

## 2019-12-30 ENCOUNTER — ANESTHESIA EVENT (INPATIENT)
Dept: LABOR AND DELIVERY | Facility: HOSPITAL | Age: 29
End: 2019-12-30
Payer: COMMERCIAL

## 2019-12-30 NOTE — ANESTHESIA PREPROCEDURE EVALUATION
Review of Systems/Medical History  Patient summary reviewed  Chart reviewed      Cardiovascular  Negative cardio ROS    Pulmonary  Negative pulmonary ROS        GI/Hepatic  Negative GI/hepatic ROS          Negative  ROS        Endo/Other  Negative endo/other ROS      GYN  Negative gynecology ROS          Hematology  Negative hematology ROS      Musculoskeletal       Neurology   Psychology   Depression ,          Results for Elizabeth Valdovinos (MRN [id number]) as of 12/30/2019 15:15   Ref   Range 12/19/2019 20:50   Sodium Latest Ref Range: 136 - 145 mmol/L 138   Potassium Latest Ref Range: 3 5 - 5 3 mmol/L 3 4 (L)   Chloride Latest Ref Range: 100 - 108 mmol/L 105   CO2 Latest Ref Range: 21 - 32 mmol/L 26   Anion Gap Latest Ref Range: 4 - 13 mmol/L 7   BUN Latest Ref Range: 5 - 25 mg/dL 11   Creatinine Latest Ref Range: 0 60 - 1 30 mg/dL 0 66   Glucose, Random Latest Ref Range: 65 - 140 mg/dL 79   Calcium Latest Ref Range: 8 3 - 10 1 mg/dL 9 4   AST Latest Ref Range: 5 - 45 U/L 13   ALT Latest Ref Range: 12 - 78 U/L 18   Alkaline Phosphatase Latest Ref Range: 46 - 116 U/L 95   Total Protein Latest Ref Range: 6 4 - 8 2 g/dL 7 1   Albumin Latest Ref Range: 3 5 - 5 0 g/dL 2 9 (L)   TOTAL BILIRUBIN Latest Ref Range: 0 20 - 1 00 mg/dL 0 42   eGFR Latest Units: ml/min/1 73sq m 119   WBC Latest Ref Range: 4 31 - 10 16 Thousand/uL 8 86   Red Blood Cell Count Latest Ref Range: 3 81 - 5 12 Million/uL 4 26   Hemoglobin Latest Ref Range: 11 5 - 15 4 g/dL 12 4   HCT Latest Ref Range: 34 8 - 46 1 % 36 8   MCV Latest Ref Range: 82 - 98 fL 86   MCH Latest Ref Range: 26 8 - 34 3 pg 29 1   MCHC Latest Ref Range: 31 4 - 37 4 g/dL 33 7   RDW Latest Ref Range: 11 6 - 15 1 % 12 7   Platelet Count Latest Ref Range: 149 - 390 Thousands/uL 209   MPV Latest Ref Range: 8 9 - 12 7 fL 12 6       Physical Exam    Airway       Dental       Cardiovascular  Comment: Negative ROS,     Pulmonary      Other Findings        Anesthesia Plan  ASA Score- 3     Anesthesia Type- regional and spinal with ASA Monitors  Additional Monitors:   Airway Plan:         Plan Factors-    Induction- intravenous  Postoperative Plan-     Informed Consent- Anesthetic plan and risks discussed with patient  I personally reviewed this patient with the CRNA  Discussed and agreed on the Anesthesia Plan with the CRNA  Pam Byers

## 2019-12-31 ENCOUNTER — HOSPITAL ENCOUNTER (INPATIENT)
Facility: HOSPITAL | Age: 29
LOS: 4 days | Discharge: HOME | End: 2020-01-04
Attending: OBSTETRICS & GYNECOLOGY | Admitting: OBSTETRICS & GYNECOLOGY
Payer: COMMERCIAL

## 2019-12-31 ENCOUNTER — ANESTHESIA (INPATIENT)
Dept: LABOR AND DELIVERY | Facility: HOSPITAL | Age: 29
End: 2019-12-31
Payer: COMMERCIAL

## 2019-12-31 DIAGNOSIS — Z98.891 S/P REPEAT LOW TRANSVERSE C-SECTION: Primary | ICD-10-CM

## 2019-12-31 DIAGNOSIS — Z3A.12 PREGNANCY WITH 12 COMPLETED WEEKS GESTATION: ICD-10-CM

## 2019-12-31 DIAGNOSIS — O13.3 GESTATIONAL HYPERTENSION, THIRD TRIMESTER: ICD-10-CM

## 2019-12-31 DIAGNOSIS — Z98.891 HISTORY OF CESAREAN DELIVERY: ICD-10-CM

## 2019-12-31 DIAGNOSIS — O34.219 PREVIOUS CESAREAN SECTION COMPLICATING PREGNANCY: ICD-10-CM

## 2019-12-31 DIAGNOSIS — Z34.83 PRENATAL CARE, SUBSEQUENT PREGNANCY, THIRD TRIMESTER: ICD-10-CM

## 2019-12-31 PROBLEM — G43.909 MIGRAINE: Status: ACTIVE | Noted: 2019-12-31

## 2019-12-31 PROBLEM — L30.9 ECZEMA: Status: ACTIVE | Noted: 2019-12-31

## 2019-12-31 PROBLEM — O13.9 GESTATIONAL HYPERTENSION: Status: ACTIVE | Noted: 2019-12-31

## 2019-12-31 PROBLEM — E66.9 OBESITY: Status: ACTIVE | Noted: 2019-12-31

## 2019-12-31 PROBLEM — F32.A DEPRESSION: Status: ACTIVE | Noted: 2019-12-31

## 2019-12-31 PROBLEM — Z3A.37 37 WEEKS GESTATION OF PREGNANCY: Status: ACTIVE | Noted: 2019-12-19

## 2019-12-31 LAB
BASE EXCESS BLDCOA CALC-SCNC: -3 MMOL/L (ref 3–11)
BASE EXCESS BLDCOV CALC-SCNC: -1.8 MMOL/L (ref 1–9)
BASOPHILS # BLD AUTO: 0.04 THOUSANDS/ΜL (ref 0–0.1)
BASOPHILS NFR BLD AUTO: 0 % (ref 0–1)
EOSINOPHIL # BLD AUTO: 0.11 THOUSAND/ΜL (ref 0–0.61)
EOSINOPHIL NFR BLD AUTO: 1 % (ref 0–6)
ERYTHROCYTE [DISTWIDTH] IN BLOOD BY AUTOMATED COUNT: 12.9 % (ref 11.6–15.1)
HCO3 BLDCOA-SCNC: 26.9 MMOL/L (ref 17.3–27.3)
HCO3 BLDCOV-SCNC: 24.7 MMOL/L (ref 12.2–28.6)
HCT VFR BLD AUTO: 36.3 % (ref 34.8–46.1)
HGB BLD-MCNC: 12.4 G/DL (ref 11.5–15.4)
HIV 1+2 AB+HIV1 P24 AG SERPL QL IA: NORMAL
HIV1 P24 AG SER QL: NORMAL
IMM GRANULOCYTES # BLD AUTO: 0.07 THOUSAND/UL (ref 0–0.2)
IMM GRANULOCYTES NFR BLD AUTO: 1 % (ref 0–2)
LYMPHOCYTES # BLD AUTO: 1.77 THOUSANDS/ΜL (ref 0.6–4.47)
LYMPHOCYTES NFR BLD AUTO: 20 % (ref 14–44)
MCH RBC QN AUTO: 29.2 PG (ref 26.8–34.3)
MCHC RBC AUTO-ENTMCNC: 34.2 G/DL (ref 31.4–37.4)
MCV RBC AUTO: 86 FL (ref 82–98)
MONOCYTES # BLD AUTO: 0.69 THOUSAND/ΜL (ref 0.17–1.22)
MONOCYTES NFR BLD AUTO: 8 % (ref 4–12)
NEUTROPHILS # BLD AUTO: 6.39 THOUSANDS/ΜL (ref 1.85–7.62)
NEUTS SEG NFR BLD AUTO: 70 % (ref 43–75)
NRBC BLD AUTO-RTO: 0 /100 WBCS
O2 CT VFR BLDCOA CALC: 2.7 ML/DL
OXYHGB MFR BLDCOA: 10.5 %
OXYHGB MFR BLDCOV: 47.8 %
PCO2 BLDCOA: 67.7 MM[HG] (ref 30–60)
PCO2 BLDCOV: 47.8 MM HG (ref 27–43)
PH BLDCOA: 7.22 [PH] (ref 7.23–7.43)
PH BLDCOV: 7.33 [PH] (ref 7.19–7.49)
PLATELET # BLD AUTO: 215 THOUSANDS/UL (ref 149–390)
PMV BLD AUTO: 13.2 FL (ref 8.9–12.7)
PO2 BLDCOA: 10.8 MM HG (ref 5–25)
PO2 BLDCOV: 21.5 MM HG (ref 15–45)
RBC # BLD AUTO: 4.24 MILLION/UL (ref 3.81–5.12)
SAO2 % BLDCOV: 12.4 ML/DL
WBC # BLD AUTO: 9.07 THOUSAND/UL (ref 4.31–10.16)

## 2019-12-31 PROCEDURE — 87806 HIV AG W/HIV1&2 ANTB W/OPTIC: CPT | Performed by: OBSTETRICS & GYNECOLOGY

## 2019-12-31 PROCEDURE — 86592 SYPHILIS TEST NON-TREP QUAL: CPT

## 2019-12-31 PROCEDURE — 86850 RBC ANTIBODY SCREEN: CPT | Performed by: OBSTETRICS & GYNECOLOGY

## 2019-12-31 PROCEDURE — 86900 BLOOD TYPING SEROLOGIC ABO: CPT | Performed by: OBSTETRICS & GYNECOLOGY

## 2019-12-31 PROCEDURE — 85025 COMPLETE CBC W/AUTO DIFF WBC: CPT | Performed by: OBSTETRICS & GYNECOLOGY

## 2019-12-31 PROCEDURE — 86901 BLOOD TYPING SEROLOGIC RH(D): CPT | Performed by: OBSTETRICS & GYNECOLOGY

## 2019-12-31 PROCEDURE — 59510 CESAREAN DELIVERY: CPT | Performed by: OBSTETRICS & GYNECOLOGY

## 2019-12-31 PROCEDURE — 82805 BLOOD GASES W/O2 SATURATION: CPT | Performed by: OBSTETRICS & GYNECOLOGY

## 2019-12-31 PROCEDURE — 99024 POSTOP FOLLOW-UP VISIT: CPT | Performed by: OBSTETRICS & GYNECOLOGY

## 2019-12-31 RX ORDER — HYDROMORPHONE HCL/PF 1 MG/ML
0.2 SYRINGE (ML) INJECTION
Status: DISCONTINUED | OUTPATIENT
Start: 2019-12-31 | End: 2020-01-01

## 2019-12-31 RX ORDER — MORPHINE SULFATE 1 MG/ML
INJECTION, SOLUTION EPIDURAL; INTRATHECAL; INTRAVENOUS AS NEEDED
Status: DISCONTINUED | OUTPATIENT
Start: 2019-12-31 | End: 2019-12-31 | Stop reason: SURG

## 2019-12-31 RX ORDER — EPHEDRINE SULFATE 50 MG/ML
INJECTION INTRAVENOUS AS NEEDED
Status: DISCONTINUED | OUTPATIENT
Start: 2019-12-31 | End: 2019-12-31 | Stop reason: SURG

## 2019-12-31 RX ORDER — PROMETHAZINE HYDROCHLORIDE 25 MG/ML
25 INJECTION, SOLUTION INTRAMUSCULAR; INTRAVENOUS ONCE AS NEEDED
Status: DISCONTINUED | OUTPATIENT
Start: 2019-12-31 | End: 2019-12-31

## 2019-12-31 RX ORDER — OXYTOCIN/RINGER'S LACTATE 30/500 ML
62.5 PLASTIC BAG, INJECTION (ML) INTRAVENOUS CONTINUOUS
Status: DISPENSED | OUTPATIENT
Start: 2019-12-31 | End: 2020-01-01

## 2019-12-31 RX ORDER — OXYCODONE HYDROCHLORIDE AND ACETAMINOPHEN 5; 325 MG/1; MG/1
2 TABLET ORAL EVERY 4 HOURS PRN
Status: DISCONTINUED | OUTPATIENT
Start: 2020-01-01 | End: 2020-01-04 | Stop reason: HOSPADM

## 2019-12-31 RX ORDER — CALCIUM CARBONATE 200(500)MG
1000 TABLET,CHEWABLE ORAL DAILY PRN
Status: DISCONTINUED | OUTPATIENT
Start: 2019-12-31 | End: 2020-01-04 | Stop reason: HOSPADM

## 2019-12-31 RX ORDER — KETOROLAC TROMETHAMINE 30 MG/ML
30 INJECTION, SOLUTION INTRAMUSCULAR; INTRAVENOUS EVERY 6 HOURS PRN
Status: DISCONTINUED | OUTPATIENT
Start: 2019-12-31 | End: 2020-01-04 | Stop reason: HOSPADM

## 2019-12-31 RX ORDER — SODIUM CHLORIDE, SODIUM LACTATE, POTASSIUM CHLORIDE, CALCIUM CHLORIDE 600; 310; 30; 20 MG/100ML; MG/100ML; MG/100ML; MG/100ML
125 INJECTION, SOLUTION INTRAVENOUS CONTINUOUS
Status: DISCONTINUED | OUTPATIENT
Start: 2019-12-31 | End: 2020-01-04 | Stop reason: HOSPADM

## 2019-12-31 RX ORDER — IBUPROFEN 600 MG/1
600 TABLET ORAL EVERY 6 HOURS PRN
Status: DISCONTINUED | OUTPATIENT
Start: 2019-12-31 | End: 2020-01-04 | Stop reason: HOSPADM

## 2019-12-31 RX ORDER — BUPIVACAINE HYDROCHLORIDE 7.5 MG/ML
INJECTION, SOLUTION INTRASPINAL AS NEEDED
Status: DISCONTINUED | OUTPATIENT
Start: 2019-12-31 | End: 2019-12-31 | Stop reason: SURG

## 2019-12-31 RX ORDER — ONDANSETRON 2 MG/ML
4 INJECTION INTRAMUSCULAR; INTRAVENOUS EVERY 8 HOURS PRN
Status: DISCONTINUED | OUTPATIENT
Start: 2019-12-31 | End: 2019-12-31

## 2019-12-31 RX ORDER — NALBUPHINE HCL 10 MG/ML
5 AMPUL (ML) INJECTION
Status: COMPLETED | OUTPATIENT
Start: 2019-12-31 | End: 2019-12-31

## 2019-12-31 RX ORDER — DOCUSATE SODIUM 100 MG/1
100 CAPSULE, LIQUID FILLED ORAL 2 TIMES DAILY
Status: DISCONTINUED | OUTPATIENT
Start: 2019-12-31 | End: 2020-01-04 | Stop reason: HOSPADM

## 2019-12-31 RX ORDER — FENTANYL CITRATE 50 UG/ML
INJECTION, SOLUTION INTRAMUSCULAR; INTRAVENOUS AS NEEDED
Status: DISCONTINUED | OUTPATIENT
Start: 2019-12-31 | End: 2019-12-31 | Stop reason: SURG

## 2019-12-31 RX ORDER — IBUPROFEN 600 MG/1
600 TABLET ORAL EVERY 6 HOURS PRN
Status: DISCONTINUED | OUTPATIENT
Start: 2019-12-31 | End: 2019-12-31

## 2019-12-31 RX ORDER — CEFAZOLIN SODIUM 2 G/50ML
2000 SOLUTION INTRAVENOUS ONCE
Status: COMPLETED | OUTPATIENT
Start: 2019-12-31 | End: 2019-12-31

## 2019-12-31 RX ORDER — ONDANSETRON 2 MG/ML
INJECTION INTRAMUSCULAR; INTRAVENOUS AS NEEDED
Status: DISCONTINUED | OUTPATIENT
Start: 2019-12-31 | End: 2019-12-31 | Stop reason: SURG

## 2019-12-31 RX ORDER — ONDANSETRON 2 MG/ML
4 INJECTION INTRAMUSCULAR; INTRAVENOUS ONCE AS NEEDED
Status: DISCONTINUED | OUTPATIENT
Start: 2019-12-31 | End: 2019-12-31

## 2019-12-31 RX ORDER — ACETAMINOPHEN 325 MG/1
650 TABLET ORAL EVERY 4 HOURS PRN
Status: DISCONTINUED | OUTPATIENT
Start: 2019-12-31 | End: 2020-01-04 | Stop reason: HOSPADM

## 2019-12-31 RX ORDER — ONDANSETRON 2 MG/ML
4 INJECTION INTRAMUSCULAR; INTRAVENOUS EVERY 8 HOURS PRN
Status: DISCONTINUED | OUTPATIENT
Start: 2019-12-31 | End: 2020-01-04 | Stop reason: HOSPADM

## 2019-12-31 RX ORDER — ACETAMINOPHEN 325 MG/1
650 TABLET ORAL EVERY 6 HOURS PRN
Status: DISCONTINUED | OUTPATIENT
Start: 2019-12-31 | End: 2020-01-04 | Stop reason: HOSPADM

## 2019-12-31 RX ORDER — HYDROMORPHONE HCL/PF 1 MG/ML
1 SYRINGE (ML) INJECTION EVERY 2 HOUR PRN
Status: DISCONTINUED | OUTPATIENT
Start: 2019-12-31 | End: 2020-01-04 | Stop reason: HOSPADM

## 2019-12-31 RX ORDER — HYDROMORPHONE HCL/PF 1 MG/ML
1 SYRINGE (ML) INJECTION EVERY 2 HOUR PRN
Status: DISCONTINUED | OUTPATIENT
Start: 2019-12-31 | End: 2019-12-31

## 2019-12-31 RX ORDER — OXYTOCIN/RINGER'S LACTATE 30/500 ML
PLASTIC BAG, INJECTION (ML) INTRAVENOUS CONTINUOUS PRN
Status: DISCONTINUED | OUTPATIENT
Start: 2019-12-31 | End: 2019-12-31 | Stop reason: SURG

## 2019-12-31 RX ORDER — PROMETHAZINE HYDROCHLORIDE 25 MG/ML
25 INJECTION, SOLUTION INTRAMUSCULAR; INTRAVENOUS ONCE AS NEEDED
Status: DISCONTINUED | OUTPATIENT
Start: 2019-12-31 | End: 2020-01-01

## 2019-12-31 RX ORDER — PANTOPRAZOLE SODIUM 40 MG/1
40 TABLET, DELAYED RELEASE ORAL DAILY
Status: DISCONTINUED | OUTPATIENT
Start: 2019-12-31 | End: 2020-01-04 | Stop reason: HOSPADM

## 2019-12-31 RX ORDER — FENTANYL CITRATE/PF 50 MCG/ML
25 SYRINGE (ML) INJECTION
Status: DISCONTINUED | OUTPATIENT
Start: 2019-12-31 | End: 2020-01-01

## 2019-12-31 RX ORDER — HYDROMORPHONE HCL/PF 1 MG/ML
0.2 SYRINGE (ML) INJECTION
Status: DISCONTINUED | OUTPATIENT
Start: 2019-12-31 | End: 2019-12-31

## 2019-12-31 RX ORDER — OXYCODONE HYDROCHLORIDE AND ACETAMINOPHEN 5; 325 MG/1; MG/1
2 TABLET ORAL EVERY 4 HOURS PRN
Status: DISCONTINUED | OUTPATIENT
Start: 2019-12-31 | End: 2019-12-31

## 2019-12-31 RX ORDER — OXYCODONE HYDROCHLORIDE AND ACETAMINOPHEN 5; 325 MG/1; MG/1
1 TABLET ORAL EVERY 4 HOURS PRN
Status: DISCONTINUED | OUTPATIENT
Start: 2020-01-01 | End: 2020-01-04 | Stop reason: HOSPADM

## 2019-12-31 RX ADMIN — CEFAZOLIN SODIUM 2000 MG: 2 SOLUTION INTRAVENOUS at 08:28

## 2019-12-31 RX ADMIN — PHENYLEPHRINE HYDROCHLORIDE 100 MCG: 10 INJECTION INTRAVENOUS at 09:18

## 2019-12-31 RX ADMIN — KETOROLAC TROMETHAMINE 30 MG: 30 INJECTION, SOLUTION INTRAMUSCULAR at 23:38

## 2019-12-31 RX ADMIN — Medication 62.5 MILLI-UNITS/MIN: at 18:34

## 2019-12-31 RX ADMIN — PHENYLEPHRINE HYDROCHLORIDE 100 MCG: 10 INJECTION INTRAVENOUS at 09:15

## 2019-12-31 RX ADMIN — FENTANYL CITRATE 15 MCG: 50 INJECTION, SOLUTION INTRAMUSCULAR; INTRAVENOUS at 08:27

## 2019-12-31 RX ADMIN — BUPIVACAINE HYDROCHLORIDE IN DEXTROSE 1.9 ML: 7.5 INJECTION, SOLUTION SUBARACHNOID at 08:27

## 2019-12-31 RX ADMIN — SODIUM CHLORIDE, SODIUM LACTATE, POTASSIUM CHLORIDE, AND CALCIUM CHLORIDE 125 ML/HR: .6; .31; .03; .02 INJECTION, SOLUTION INTRAVENOUS at 23:19

## 2019-12-31 RX ADMIN — KETOROLAC TROMETHAMINE 30 MG: 30 INJECTION, SOLUTION INTRAMUSCULAR at 14:51

## 2019-12-31 RX ADMIN — Medication 999 MILLI-UNITS/MIN: at 08:58

## 2019-12-31 RX ADMIN — SODIUM CHLORIDE, SODIUM LACTATE, POTASSIUM CHLORIDE, AND CALCIUM CHLORIDE 125 ML/HR: .6; .31; .03; .02 INJECTION, SOLUTION INTRAVENOUS at 10:01

## 2019-12-31 RX ADMIN — MORPHINE SULFATE 0.2 MG: 1 INJECTION, SOLUTION EPIDURAL; INTRATHECAL; INTRAVENOUS at 08:27

## 2019-12-31 RX ADMIN — SODIUM CHLORIDE, SODIUM LACTATE, POTASSIUM CHLORIDE, AND CALCIUM CHLORIDE: .6; .31; .03; .02 INJECTION, SOLUTION INTRAVENOUS at 08:33

## 2019-12-31 RX ADMIN — SODIUM CHLORIDE, SODIUM LACTATE, POTASSIUM CHLORIDE, AND CALCIUM CHLORIDE 125 ML/HR: .6; .31; .03; .02 INJECTION, SOLUTION INTRAVENOUS at 15:22

## 2019-12-31 RX ADMIN — SODIUM CHLORIDE, SODIUM LACTATE, POTASSIUM CHLORIDE, AND CALCIUM CHLORIDE 1000 ML: .6; .31; .03; .02 INJECTION, SOLUTION INTRAVENOUS at 07:19

## 2019-12-31 RX ADMIN — Medication 62.5 MILLI-UNITS/MIN: at 10:30

## 2019-12-31 RX ADMIN — ONDANSETRON 4 MG: 2 INJECTION INTRAMUSCULAR; INTRAVENOUS at 08:30

## 2019-12-31 RX ADMIN — EPHEDRINE SULFATE 10 MG: 50 INJECTION, SOLUTION INTRAVENOUS at 08:29

## 2019-12-31 RX ADMIN — NALBUPHINE HYDROCHLORIDE 5 MG: 10 INJECTION, SOLUTION INTRAMUSCULAR; INTRAVENOUS; SUBCUTANEOUS at 12:33

## 2019-12-31 RX ADMIN — NALBUPHINE HYDROCHLORIDE 5 MG: 10 INJECTION, SOLUTION INTRAMUSCULAR; INTRAVENOUS; SUBCUTANEOUS at 23:38

## 2019-12-31 RX ADMIN — NALBUPHINE HYDROCHLORIDE 5 MG: 10 INJECTION, SOLUTION INTRAMUSCULAR; INTRAVENOUS; SUBCUTANEOUS at 16:46

## 2019-12-31 RX ADMIN — PHENYLEPHRINE HYDROCHLORIDE 50 MCG/MIN: 10 INJECTION INTRAVENOUS at 08:29

## 2019-12-31 NOTE — ANESTHESIA POSTPROCEDURE EVALUATION
Post-Op Assessment Note    CV Status:  Stable  Pain Score: 0    Pain management: adequate     Mental Status:  Alert   Hydration Status:  Stable   PONV Controlled:  None   Airway Patency:  Patent   Post Op Vitals Reviewed: Yes      Staff: Anesthesiologist            BP   122/85   Temp   97 5   Pulse  85   Resp   12   SpO2   95 on RA

## 2019-12-31 NOTE — H&P
History & Physical - OB/GYN   Nawaf Rodríguez 26 y o  female MRN: [id number]  Unit/Bed#: LD PACU-03 Encounter: [id number]    She is a patient of Dr Hemant Yoo     Chief complaint: here for repeat     HPI: 26 y o  Vish Giron at 37w6d by LMP who presents for repeat  section  Her first C section was for fetal intolerance to labor  Contractions:  no  Fetal movement:  yes  Vaginal bleeding:   no  Leaking of fluid:  no      Pregnancy Complications:  Patient Active Problem List   Diagnosis    Obesity affecting pregnancy, antepartum    Pseudomonas urinary tract infection    History of  delivery    Elevated blood pressure affecting pregnancy, antepartum    Prenatal care, subsequent pregnancy, third trimester    37 weeks gestation of pregnancy    Maternal morbid obesity in third trimester, antepartum ([address] )    Gestational hypertension       PMH:  Past Medical History:   Diagnosis Date    Abnormal placenta, antepartum     resolved 17    Depression     Maternal morbid obesity, antepartum ([address] )     resolved 3/4/26 obeity complicating pregnancy, child birth or puerperium, antepartum    Varicella     varicella vaccine       PSH:  Past Surgical History:   Procedure Laterality Date     SECTION  2015       Social Hx:  Smoking: none  Alcohol use in pregnancy: none  Illicit drug use: none  Safe at home: yes    OB Hx:  OB History    Para Term  AB Living   2 1 1 0 0 1   SAB TAB Ectopic Multiple Live Births   0 0 0 0 1      # Outcome Date GA Lbr Chan/2nd Weight Sex Delivery Anes PTL Lv   2 Current            1 Term 09/12/15 39w0d  3090 g (6 lb 13 oz) F CS-LTranv EPI  XAVIER      Complications: Fetal Intolerance      Name: Bitely       Meds:  No current facility-administered medications on file prior to encounter        Current Outpatient Medications on File Prior to Encounter   Medication Sig Dispense Refill    calcium carbonate (TUMS) 500 mg chewable tablet Chew 750 mg daily      Cholecalciferol (VITAMIN D) 2000 units CAPS Take by mouth      diphenhydrAMINE (BENADRYL) 12 5 mg/5 mL oral liquid Take 12 5 mg by mouth 4 (four) times a day as needed for allergies      omeprazole (PriLOSEC OTC) 20 MG tablet Take 20 mg by mouth daily      Prenat w/o P-KQ-Mavodoz-FA-DHA (PNV-DHA) 27-0 6-0 4-300 MG CAPS Take by mouth         Allergies: Allergies   Allergen Reactions    Doxycycline Hives       Labs:  Blood type: O+  Antibody: negative  Group B strep: negative  HIV: negative  Hepatitis B: negative  RPR: NR  Rubella: Immune  Varicella Immune  1 hour Glucose: 163    Physical Exam:  Pulse 81   Temp 98 7 °F (37 1 °C) (Temporal)   Resp 18   Wt 135 kg (297 lb)   LMP 04/10/2019   SpO2 98%   BMI 39 18 kg/m²     Physical Exam   Constitutional: She is oriented to person, place, and time  She appears well-developed and well-nourished  No distress  HENT:   Head: Normocephalic and atraumatic  Neck: Normal range of motion  Cardiovascular: Normal rate, regular rhythm and normal heart sounds  Exam reveals no friction rub  No murmur heard  Pulmonary/Chest: Effort normal and breath sounds normal  No respiratory distress  She has no wheezes  Abdominal: Soft  She exhibits distension  Musculoskeletal: Normal range of motion  She exhibits no edema, tenderness or deformity  Neurological: She is alert and oriented to person, place, and time  Skin: Skin is warm and dry  She is not diaphoretic  Psychiatric: She has a normal mood and affect  Her behavior is normal  Judgment and thought content normal        Estimated Fetal Weight: 8 lbs  Presentation: vertex       FHT:  150 / Moderate 6 - 25 bpm / acelerations, no decelerations   Hyden: irritability     Membranes: intact     Assessment:   26 y o   at 37w6d here for repeat  section     Plan:   1  Admit to L&D for repeat  section   2  CBC, RPR, type and screen  3  GBS negative  4   LR @125cc/hour   Epidural upon request    Discussed with Dr Solitario Patton

## 2019-12-31 NOTE — LACTATION NOTE
This note was copied from a baby's chart  CONSULT - LACTATION  Baby Boy Shaylee Patel) Reger 0 days male MRN: [id number]    [facility] Room / Bed: (N)/(N) Encounter: [id number]    Maternal Information     MOTHER:  Curtis Kim  Maternal Age: 26 y o    OB History: #: 1, Date: 09/12/15, Sex: Female, Weight: 3090 g (6 lb 13 oz), GA: 39w0d, Delivery: , Low Transverse, Apgar1: None, Apgar5: None, Living: Living, Birth Comments: None    #: 2, Date: 19, Sex: Male, Weight: 3527 g (7 lb 12 4 oz), GA: 37w6d, Delivery: , Low Transverse, Apgar1: 8, Apgar5: 9, Living: Living, Birth Comments: None   Previouse breast reduction surgery? No    Lactation history:   Has patient previously breast fed: Yes   How long had patient previously breast fed: 6 weeks   Previous breast feeding complications: Low milk supply(extensive lactation support)     Past Surgical History:   Procedure Laterality Date     SECTION  2015       Birth information:  YOB: 2020   Time of birth: 8:57 AM   Sex: male   Delivery type: , Low Transverse   Birth Weight: 3527 g (7 lb 12 4 oz)   Percent of Weight Change: 0%     Gestational Age: 41w10d   [unfilled]    Assessment     Breast and nipple assessment: tubular breasts, hx low milk supply possibly related to underdeveloped tissue     Assessment: normal assessment    Feeding assessment: no latch, sleepy baby at this time  LATCH:  Latch: Too sleepy or reluctant, no latch achieved   Audible Swallowing: None   Type of Nipple: Everted (After stimulation)   Comfort (Breast/Nipple): Soft/non-tender   Hold (Positioning): No assist from staff, mother able to position/hold infant   LATCH Score: 6          Feeding recommendations:  breast feed on demand , early supplementation if infant shows signs of hunger/dehydration    Met with mother   Provided mother with Ready, Set, Baby booklet  Discussed Skin to Skin contact an benefits to mom and baby  Talked about the delay of the first bath until baby has adjusted  Spoke about the benefits of rooming in  Feeding on cue and what that means for recognizing infant's hunger  Avoidance of pacifiers for the first month discussed  Talked about exclusive breastfeeding for the first 6 months  Positioning and latch reviewed as well as showing images of other feeding positions  Discussed the properties of a good latch in any position  Reviewed hand/manual expression  Discussed s/s that baby is getting enough milk and some s/s that breastfeeding dyad may need further help  Gave information on common concerns, what to expect the first few weeks after delivery, preparing for other caregivers, and how partners can help  Resources for support also provided  Discussed 2nd night syndrome and ways to calm infant  Hand out given  Information on hand expression given  Discussed benefits of knowing how to manually express breast including stimulating milk supply, softening nipple for latch and evacuating breast in the event of engorgement  Easily expressed colostrum  Baby positioned in football hold  Mom supports infant without assistance  Infant too sleepy to latch at this time  Risk factors associated with underdeveloped breast tissue discussed with Mom  Mom plans to supplement early if she feels infant is not eating enough  Her first baby lost weight and show signs of dehydration and Mom states she would like to avoid this happening again       Emeterio Pan RN 12/31/2019 5:35 PM

## 2019-12-31 NOTE — OP NOTE
OPERATIVE REPORT  PATIENT NAME: Zak Tyler    :  1990  MRN: [id number]  Pt Location: [facility] L&D OR ROOM 02    SURGERY DATE: 2019    Surgeon(s) and Role:     * Beatriz Caldera MD - Primary     * Ana Boucher MD - Assisting    Preop Diagnosis:  Previous  section complicating pregnancy [X95 876]  Gestational hypertension, third trimester [O13 3]    Post-Op Diagnosis Codes:     * Previous  section complicating pregnancy [Z17 581]     * Gestational hypertension, third trimester [O13 3]    Procedure(s) (LRB):   SECTION () REPEAT (N/A)    Specimen(s):  ID Type Source Tests Collected by Time Destination   A : cord gases Cord Blood Cord BLOOD GAS, VENOUS, CORD, BLOOD GAS, ARTERIAL, CORD Beatriz Caldera MD 2019 [id number]    B :  storage per doctor Tissue (Placenta on Hold) OB Only Placenta PLACENTA IN STORAGE Beatriz Caldera MD 2019 0900        Estimated Blood Loss:   Minimal    Drains:  Urethral Catheter Non-latex 16 Fr  (Active)   Reasons to continue Urinary Catheter  Post-operative urological requirements 2019  8:30 AM   Goal for Removal Remove POD#1 2019  8:30 AM   Site Assessment Skin intact; Clean 2019  8:30 AM   Collection Container Standard drainage bag 2019  8:30 AM   Securement Method Securing device (Describe) 2019  8:30 AM   Number of days: 0     Anesthesia Type: Spinal     Operative Indications:  Previous  section complicating pregnancy [Z60 370]  Gestational hypertension, third trimester [O13 3]    Operative Findings:  1  Viable male  at 8:57 with APGARs of 8 and 9 at 1 and 5 minutes  Fetus weighted 7lb 12oz  2  Normal intact placenta with centrally inserted 3VC expressed at 8:59    3  Normal uterus, bilateral tubes and ovaries  4  Blood gases:   Arterial pH: 7 217   Arterial base excess: -3 0   Venous pH: 7 331   Venous base excess: -1 8    The patient was taken to the operating room   Spinal anesthesia was adequately established and Ancef was given for preoperative prophylaxis  The patient was then placed in the dorsal supine position with a left tilt of the hips  The patient was then prepped with betadine for vaginal prep and chloraprep for abdominal prep and draped in the usual sterile fashion for a Pfannenstial skin incision  A time out was performed to confirm correct patient and correct procedure  An incision was made in the skin with a surgical scalpel and sharp dissection was carried out over subsequent layers of tissue including the fascia, followed by the Bovie electrocautery for hemostasis  The fascia was incised at the midline and the fascial incision was extended bilaterally using the curved Felder scissors  The superior edge of the fascial incision was grasped with Kocher clamps, tented up and the underlying rectus muscles were dissected off bluntly and sharply using the scalpel  The rectus muscles were then divided at midline and the peritoneum was identified, tented up at its upper margin taking care to avoid the bladder, and then entered  The peritoneal incision was extended superiorly and inferiorly  The bladder blade was inserted and the vesicouterine peritoneum was identified, grasped with forceps and cut laterally to both sides using the Metzenbaum scissors  A bladder blade was reinserted and a transverse incision was made in the lower uterine segment using a new surgical blade  The uterine incision was extended cephalad and caudal using blunt dissection  The amniotic sac was entered and the amniotic fluid was noted to be clear   The surgeon's hand was placed into the uterine cavity  The fetal head was identified and was noted to be floating  The head was brought to the level of the hysterotomy  The uterine incision was extended bluntly, but still did not deliver  A Kiwi was placed and with one pull, the head delivered through the uterine incision with the assistance of fundal pressure   With gentle traction, the shoulder was delivered, followed by the rest of the fetal body  There was no nuchal cord noted  On delivery the cord was doubly clamped and cut after delayed cord clamping  The infant was then passed off the table to the awaiting  staff  The  was noted to cry spontaneously and moved all extremities  Venous and arterial blood gas, cord blood, and portion of cord was obtained for analysis and routine blood testing  The placenta delivery was then sent to storage  Placenta was noted to be intact with a centrally inserted three-vessel cord  Oxytocin was administered by IV infusion to enhance uterine contraction  The uterus was exteriorized and cleared of all clots and remaining products of conception  The uterine incision was re approximated using a 0-Monocryl in a running locked fashion  A second vertical imbricating stitch with 1-0 Monocryl was applied  The uterine incision was examined and noted to be hemostatic  The posterior cul-de-sac was cleared of all clots and products of conception  The uterus was replaced into the abdomen and the pericolic gutters were cleared of all clots  The uterine incision was once again reexamined and noted to be hemostatic  Surgicell was placed over the hysterotomy The fascia was re approximated using 0-Vicryl in a running nonlocked fashion  The subcutaneous tissue was irrigated and cleared of all clots and debris  Good hemostasis was noted with Bovie electrocautery  The skin incision was closed using staples  Good hemostasis was noted  Patient tolerated the procedure well  All needle, sponge, and instrument counts were noted to be correct x 2 at the end of the procedure  Patient was transferred to the recovery room in stable condition  Dr Jan Haddad was present for the procedure          Patient Disposition:  PACU     SIGNATURE: Quincy Maynard MD  DATE: 2019  TIME: 9:56 AM

## 2019-12-31 NOTE — DISCHARGE SUMMARY
Discharge Summary - OB/GYN   Jesse Rodríguez 26 y o  female MRN: [id number]  Unit/Bed#: -01 Encounter: [id number]      Admission Date: 2019     Discharge Date: 1/3/2020    Admitting Diagnosis:   1  Pregnancy at 37w6d  2  Gestational hypertension    Discharge Diagnosis:   Same, delivered      Procedures: repeat  section, low transverse incision    Attending: Lionel Knox MD    Hospital Course:     Danielle Perez is a 26 y o  Dontess Paez at 37w6d wks who was initially admitted for repeat  section, in addition patient with diagnostic of gestational hypertension, BP between 130-140/70-80,  protein creatinine ratio 0 09, patient was asyntomatic  She delivered a viable male  on 19 at [phone]  Weight 7lbs 12 4oz via repeat  section, low transverse incision  Apgars were 8 (1 min) and 9 (5 min)   was transferred to  nursery  Patient tolerated the procedure well and was transferred to recovery in stable condition  Her post-operative course was uncomplicated  Preoperative hemaglobin was 12 4, postoperative was 9 6  Her postoperative pain was well controlled with oral analgesics  On day of discharge, she was ambulating and able to reasonably perform all ADLs  She was voiding and had appropriate bowel function  Pain was well controlled  She was discharged home on post-operative day #3 without complications  Patient was instructed to follow up with her OB as an outpatient and was given appropriate warnings to call provider if she develops signs of infection or uncontrolled pain  Complications: none apparent    Condition at discharge: good     Discharge instructions/Information to patient and family:   See after visit summary for information provided to patient and family  Provisions for Follow-Up Care:  See after visit summary for information related to follow-up care and any pertinent home health orders        Disposition: Home    Planned Readmission: No    Discharge Medications: For a complete list of the patient's medications, please refer to her med rec      Chintan Alves MD

## 2019-12-31 NOTE — ANESTHESIA PROCEDURE NOTES
Spinal Block    Patient location during procedure: OR  Start time: 12/31/2019 8:27 AM  Reason for block: procedure for pain and at surgeon's request  Staffing  Performed: anesthesiologist   Preanesthetic Checklist  Completed: patient identified, site marked, surgical consent, pre-op evaluation, timeout performed, IV checked, risks and benefits discussed and monitors and equipment checked  Spinal Block  Patient position: sitting  Prep: ChloraPrep  Patient monitoring: cardiac monitor and frequent blood pressure checks  Approach: midline  Location: L3-4  Injection technique: single-shot  Needle  Needle type: pencil-tip   Needle gauge: 25 G  Needle length: 15 cm  Assessment  Sensory level: T4  Injection Assessment:  negative aspiration for heme, no paresthesia on injection and positive aspiration for clear CSF    Post-procedure:  adhesive bandage applied, pressure dressing applied, secured with tape, site cleaned and sterile dressing applied

## 2019-12-31 NOTE — ADDENDUM NOTE
Addendum  created 12/31/19 1043 by Sj Turk MD    Actions taken from a BestPractice Advisory, Order list changed, Order sets accessed

## 2019-12-31 NOTE — PLAN OF CARE
Problem: PAIN - ADULT  Goal: Verbalizes/displays adequate comfort level or baseline comfort level  Description  Interventions:  - Encourage patient to monitor pain and request assistance  - Assess pain using appropriate pain scale  - Administer analgesics based on type and severity of pain and evaluate response  - Implement non-pharmacological measures as appropriate and evaluate response  - Consider cultural and social influences on pain and pain management  - Notify physician/advanced practitioner if interventions unsuccessful or patient reports new pain  Outcome: Progressing     Problem: INFECTION - ADULT  Goal: Absence or prevention of progression during hospitalization  Description  INTERVENTIONS:  - Assess and monitor for signs and symptoms of infection  - Monitor lab/diagnostic results  - Monitor all insertion sites, i e  indwelling lines, tubes, and drains  - Monitor endotracheal if appropriate and nasal secretions for changes in amount and color  - [place] appropriate cooling/warming therapies per order  - Administer medications as ordered  - Instruct and encourage patient and family to use good hand hygiene technique  - Identify and instruct in appropriate isolation precautions for identified infection/condition  Outcome: Progressing  Goal: Absence of fever/infection during neutropenic period  Description  INTERVENTIONS:  - Monitor WBC    Outcome: Progressing     Problem: SAFETY ADULT  Goal: Patient will remain free of falls  Description  INTERVENTIONS:  - Assess patient frequently for physical needs  -  Identify cognitive and physical deficits and behaviors that affect risk of falls    -  [place] fall precautions as indicated by assessment   - Educate patient/family on patient safety including physical limitations  - Instruct patient to call for assistance with activity based on assessment  - Modify environment to reduce risk of injury  - Consider OT/PT consult to assist with strengthening/mobility  Outcome: Progressing  Goal: Maintain or return to baseline ADL function  Description  INTERVENTIONS:  -  Assess patient's ability to carry out ADLs; assess patient's baseline for ADL function and identify physical deficits which impact ability to perform ADLs (bathing, care of mouth/teeth, toileting, grooming, dressing, etc )  - Assess/evaluate cause of self-care deficits   - Assess range of motion  - Assess patient's mobility; develop plan if impaired  - Assess patient's need for assistive devices and provide as appropriate  - Encourage maximum independence but intervene and supervise when necessary  - Involve family in performance of ADLs  - Assess for home care needs following discharge   - Consider OT consult to assist with ADL evaluation and planning for discharge  - Provide patient education as appropriate  Outcome: Progressing  Goal: Maintain or return mobility status to optimal level  Description  INTERVENTIONS:  - Assess patient's baseline mobility status (ambulation, transfers, stairs, etc )    - Identify cognitive and physical deficits and behaviors that affect mobility  - Identify mobility aids required to assist with transfers and/or ambulation (gait belt, sit-to-stand, lift, walker, cane, etc )  - [place] fall precautions as indicated by assessment  - Record patient progress and toleration of activity level on Mobility SBAR; progress patient to next Phase/Stage  - Instruct patient to call for assistance with activity based on assessment  - Consider rehabilitation consult to assist with strengthening/weightbearing, etc   Outcome: Progressing     Problem: Knowledge Deficit  Goal: Patient/family/caregiver demonstrates understanding of disease process, treatment plan, medications, and discharge instructions  Description  Complete learning assessment and assess knowledge base    Interventions:  - Provide teaching at level of understanding  - Provide teaching via preferred learning methods  Outcome: Progressing     Problem: DISCHARGE PLANNING  Goal: Discharge to home or other facility with appropriate resources  Description  INTERVENTIONS:  - Identify barriers to discharge w/patient and caregiver  - Arrange for needed discharge resources and transportation as appropriate  - Identify discharge learning needs (meds, wound care, etc )  - Arrange for interpretive services to assist at discharge as needed  - Refer to Case Management Department for coordinating discharge planning if the patient needs post-hospital services based on physician/advanced practitioner order or complex needs related to functional status, cognitive ability, or social support system  Outcome: Progressing     Problem: POSTPARTUM  Goal: Experiences normal postpartum course  Description  INTERVENTIONS:  - Monitor maternal vital signs  - Assess uterine involution and lochia  Outcome: Progressing  Goal: Appropriate maternal -  bonding  Description  INTERVENTIONS:  - Identify family support  - Assess for appropriate maternal/infant bonding   -Encourage maternal/infant bonding opportunities  - Referral to  or  as needed  Outcome: Progressing  Goal: Establishment of infant feeding pattern  Description  INTERVENTIONS:  - Assess breast/bottle feeding  - Refer to lactation as needed  Outcome: Progressing  Goal: Incision(s), wounds(s) or drain site(s) healing without S/S of infection  Description  INTERVENTIONS  - Assess and document risk factors for skin impairment   - Assess and document dressing, incision, wound bed, drain sites and surrounding tissue  - Consider nutrition services referral as needed  - Oral mucous membranes remain intact  - Provide patient/ family education  Outcome: Progressing

## 2019-12-31 NOTE — PLAN OF CARE
Problem: PAIN - ADULT  Goal: Verbalizes/displays adequate comfort level or baseline comfort level  Description  Interventions:  - Encourage patient to monitor pain and request assistance  - Assess pain using appropriate pain scale  - Administer analgesics based on type and severity of pain and evaluate response  - Implement non-pharmacological measures as appropriate and evaluate response  - Consider cultural and social influences on pain and pain management  - Notify physician/advanced practitioner if interventions unsuccessful or patient reports new pain  Outcome: Progressing     Problem: INFECTION - ADULT  Goal: Absence or prevention of progression during hospitalization  Description  INTERVENTIONS:  - Assess and monitor for signs and symptoms of infection  - Monitor lab/diagnostic results  - Monitor all insertion sites, i e  indwelling lines, tubes, and drains  - Monitor endotracheal if appropriate and nasal secretions for changes in amount and color  - [place] appropriate cooling/warming therapies per order  - Administer medications as ordered  - Instruct and encourage patient and family to use good hand hygiene technique  - Identify and instruct in appropriate isolation precautions for identified infection/condition  Outcome: Progressing  Goal: Absence of fever/infection during neutropenic period  Description  INTERVENTIONS:  - Monitor WBC    Outcome: Progressing     Problem: SAFETY ADULT  Goal: Patient will remain free of falls  Description  INTERVENTIONS:  - Assess patient frequently for physical needs  -  Identify cognitive and physical deficits and behaviors that affect risk of falls    -  [place] fall precautions as indicated by assessment   - Educate patient/family on patient safety including physical limitations  - Instruct patient to call for assistance with activity based on assessment  - Modify environment to reduce risk of injury  - Consider OT/PT consult to assist with strengthening/mobility  Outcome: Progressing  Goal: Maintain or return to baseline ADL function  Description  INTERVENTIONS:  -  Assess patient's ability to carry out ADLs; assess patient's baseline for ADL function and identify physical deficits which impact ability to perform ADLs (bathing, care of mouth/teeth, toileting, grooming, dressing, etc )  - Assess/evaluate cause of self-care deficits   - Assess range of motion  - Assess patient's mobility; develop plan if impaired  - Assess patient's need for assistive devices and provide as appropriate  - Encourage maximum independence but intervene and supervise when necessary  - Involve family in performance of ADLs  - Assess for home care needs following discharge   - Consider OT consult to assist with ADL evaluation and planning for discharge  - Provide patient education as appropriate  Outcome: Progressing  Goal: Maintain or return mobility status to optimal level  Description  INTERVENTIONS:  - Assess patient's baseline mobility status (ambulation, transfers, stairs, etc )    - Identify cognitive and physical deficits and behaviors that affect mobility  - Identify mobility aids required to assist with transfers and/or ambulation (gait belt, sit-to-stand, lift, walker, cane, etc )  - [place] fall precautions as indicated by assessment  - Record patient progress and toleration of activity level on Mobility SBAR; progress patient to next Phase/Stage  - Instruct patient to call for assistance with activity based on assessment  - Consider rehabilitation consult to assist with strengthening/weightbearing, etc   Outcome: Progressing     Problem: Knowledge Deficit  Goal: Patient/family/caregiver demonstrates understanding of disease process, treatment plan, medications, and discharge instructions  Description  Complete learning assessment and assess knowledge base    Interventions:  - Provide teaching at level of understanding  - Provide teaching via preferred learning methods  Outcome: Progressing     Problem: DISCHARGE PLANNING  Goal: Discharge to home or other facility with appropriate resources  Description  INTERVENTIONS:  - Identify barriers to discharge w/patient and caregiver  - Arrange for needed discharge resources and transportation as appropriate  - Identify discharge learning needs (meds, wound care, etc )  - Arrange for interpretive services to assist at discharge as needed  - Refer to Case Management Department for coordinating discharge planning if the patient needs post-hospital services based on physician/advanced practitioner order or complex needs related to functional status, cognitive ability, or social support system  Outcome: Progressing     Problem: BIRTH - VAGINAL/ SECTION  Goal: Fetal and maternal status remain reassuring during the birth process  Description  INTERVENTIONS:  - Monitor vital signs  - Monitor fetal heart rate  - Monitor uterine activity  - Monitor labor progression (vaginal delivery)  - DVT prophylaxis  - Antibiotic prophylaxis  Outcome: Progressing  Goal: Emotionally satisfying birthing experience for mother/fetus  Description  Interventions:  - Assess, plan, implement and evaluate the nursing care given to the patient in labor  - Advocate the philosophy that each childbirth experience is a unique experience and support the family's chosen level of involvement and control during the labor process   - Actively participate in both the patient's and family's teaching of the birth process  - Consider cultural, [place] and age-specific factors and plan care for the patient in labor  Outcome: Progressing

## 2019-12-31 NOTE — PLAN OF CARE
Problem: PAIN - ADULT  Goal: Verbalizes/displays adequate comfort level or baseline comfort level  Description  Interventions:  - Encourage patient to monitor pain and request assistance  - Assess pain using appropriate pain scale  - Administer analgesics based on type and severity of pain and evaluate response  - Implement non-pharmacological measures as appropriate and evaluate response  - Consider cultural and social influences on pain and pain management  - Notify physician/advanced practitioner if interventions unsuccessful or patient reports new pain  Outcome: Progressing     Problem: INFECTION - ADULT  Goal: Absence or prevention of progression during hospitalization  Description  INTERVENTIONS:  - Assess and monitor for signs and symptoms of infection  - Monitor lab/diagnostic results  - Monitor all insertion sites, i e  indwelling lines, tubes, and drains  - Monitor endotracheal if appropriate and nasal secretions for changes in amount and color  - [place] appropriate cooling/warming therapies per order  - Administer medications as ordered  - Instruct and encourage patient and family to use good hand hygiene technique  - Identify and instruct in appropriate isolation precautions for identified infection/condition  Outcome: Progressing  Goal: Absence of fever/infection during neutropenic period  Description  INTERVENTIONS:  - Monitor WBC    Outcome: Progressing     Problem: SAFETY ADULT  Goal: Patient will remain free of falls  Description  INTERVENTIONS:  - Assess patient frequently for physical needs  -  Identify cognitive and physical deficits and behaviors that affect risk of falls    -  [place] fall precautions as indicated by assessment   - Educate patient/family on patient safety including physical limitations  - Instruct patient to call for assistance with activity based on assessment  - Modify environment to reduce risk of injury  - Consider OT/PT consult to assist with strengthening/mobility  Outcome: Progressing  Goal: Maintain or return to baseline ADL function  Description  INTERVENTIONS:  -  Assess patient's ability to carry out ADLs; assess patient's baseline for ADL function and identify physical deficits which impact ability to perform ADLs (bathing, care of mouth/teeth, toileting, grooming, dressing, etc )  - Assess/evaluate cause of self-care deficits   - Assess range of motion  - Assess patient's mobility; develop plan if impaired  - Assess patient's need for assistive devices and provide as appropriate  - Encourage maximum independence but intervene and supervise when necessary  - Involve family in performance of ADLs  - Assess for home care needs following discharge   - Consider OT consult to assist with ADL evaluation and planning for discharge  - Provide patient education as appropriate  Outcome: Progressing  Goal: Maintain or return mobility status to optimal level  Description  INTERVENTIONS:  - Assess patient's baseline mobility status (ambulation, transfers, stairs, etc )    - Identify cognitive and physical deficits and behaviors that affect mobility  - Identify mobility aids required to assist with transfers and/or ambulation (gait belt, sit-to-stand, lift, walker, cane, etc )  - [place] fall precautions as indicated by assessment  - Record patient progress and toleration of activity level on Mobility SBAR; progress patient to next Phase/Stage  - Instruct patient to call for assistance with activity based on assessment  - Consider rehabilitation consult to assist with strengthening/weightbearing, etc   Outcome: Progressing     Problem: Knowledge Deficit  Goal: Patient/family/caregiver demonstrates understanding of disease process, treatment plan, medications, and discharge instructions  Description  Complete learning assessment and assess knowledge base    Interventions:  - Provide teaching at level of understanding  - Provide teaching via preferred learning methods  Outcome: Progressing     Problem: DISCHARGE PLANNING  Goal: Discharge to home or other facility with appropriate resources  Description  INTERVENTIONS:  - Identify barriers to discharge w/patient and caregiver  - Arrange for needed discharge resources and transportation as appropriate  - Identify discharge learning needs (meds, wound care, etc )  - Arrange for interpretive services to assist at discharge as needed  - Refer to Case Management Department for coordinating discharge planning if the patient needs post-hospital services based on physician/advanced practitioner order or complex needs related to functional status, cognitive ability, or social support system  Outcome: Progressing     Problem: POSTPARTUM  Goal: Experiences normal postpartum course  Description  INTERVENTIONS:  - Monitor maternal vital signs  - Assess uterine involution and lochia  Outcome: Progressing  Goal: Appropriate maternal -  bonding  Description  INTERVENTIONS:  - Identify family support  - Assess for appropriate maternal/infant bonding   -Encourage maternal/infant bonding opportunities  - Referral to  or  as needed  Outcome: Progressing  Goal: Establishment of infant feeding pattern  Description  INTERVENTIONS:  - Assess breast/bottle feeding  - Refer to lactation as needed  Outcome: Progressing  Goal: Incision(s), wounds(s) or drain site(s) healing without S/S of infection  Description  INTERVENTIONS  - Assess and document risk factors for skin impairment   - Assess and document dressing, incision, wound bed, drain sites and surrounding tissue  - Consider nutrition services referral as needed  - Oral mucous membranes remain intact  - Provide patient/ family education  Outcome: Progressing

## 2019-12-31 NOTE — PLAN OF CARE
Problem: BIRTH - VAGINAL/ SECTION  Goal: Fetal and maternal status remain reassuring during the birth process  Description  INTERVENTIONS:  - Monitor vital signs  - Monitor fetal heart rate  - Monitor uterine activity  - Monitor labor progression (vaginal delivery)  - DVT prophylaxis  - Antibiotic prophylaxis  Outcome: Completed  Goal: Emotionally satisfying birthing experience for mother/fetus  Description  Interventions:  - Assess, plan, implement and evaluate the nursing care given to the patient in labor  - Advocate the philosophy that each childbirth experience is a unique experience and support the family's chosen level of involvement and control during the labor process   - Actively participate in both the patient's and family's teaching of the birth process  - Consider cultural, Worship and age-specific factors and plan care for the patient in labor  Outcome: Completed

## 2020-01-01 LAB
ABO GROUP BLD: NORMAL
BLD GP AB SCN SERPL QL: NEGATIVE
ERYTHROCYTE [DISTWIDTH] IN BLOOD BY AUTOMATED COUNT: 12.7 % (ref 11.6–15.1)
HCT VFR BLD AUTO: 28.8 % (ref 34.8–46.1)
HGB BLD-MCNC: 9.6 G/DL (ref 11.5–15.4)
MCH RBC QN AUTO: 28.9 PG (ref 26.8–34.3)
MCHC RBC AUTO-ENTMCNC: 33.3 G/DL (ref 31.4–37.4)
MCV RBC AUTO: 87 FL (ref 82–98)
PLATELET # BLD AUTO: 133 THOUSANDS/UL (ref 149–390)
PMV BLD AUTO: 12.5 FL (ref 8.9–12.7)
RBC # BLD AUTO: 3.32 MILLION/UL (ref 3.81–5.12)
RH BLD: POSITIVE
SPECIMEN EXPIRATION DATE: NORMAL
WBC # BLD AUTO: 8.24 THOUSAND/UL (ref 4.31–10.16)

## 2020-01-01 PROCEDURE — 85027 COMPLETE CBC AUTOMATED: CPT | Performed by: OBSTETRICS & GYNECOLOGY

## 2020-01-01 PROCEDURE — 99024 POSTOP FOLLOW-UP VISIT: CPT | Performed by: OBSTETRICS & GYNECOLOGY

## 2020-01-01 RX ORDER — SIMETHICONE 80 MG
80 TABLET,CHEWABLE ORAL EVERY 6 HOURS PRN
Status: DISCONTINUED | OUTPATIENT
Start: 2020-01-01 | End: 2020-01-04 | Stop reason: HOSPADM

## 2020-01-01 RX ADMIN — Medication 62.5 MILLI-UNITS/MIN: at 02:20

## 2020-01-01 RX ADMIN — OXYCODONE HYDROCHLORIDE AND ACETAMINOPHEN 2 TABLET: 5; 325 TABLET ORAL at 09:27

## 2020-01-01 RX ADMIN — OXYCODONE HYDROCHLORIDE AND ACETAMINOPHEN 2 TABLET: 5; 325 TABLET ORAL at 21:41

## 2020-01-01 RX ADMIN — KETOROLAC TROMETHAMINE 30 MG: 30 INJECTION, SOLUTION INTRAMUSCULAR at 15:28

## 2020-01-01 RX ADMIN — DOCUSATE SODIUM 100 MG: 100 CAPSULE, LIQUID FILLED ORAL at 17:45

## 2020-01-01 RX ADMIN — DOCUSATE SODIUM 100 MG: 100 CAPSULE, LIQUID FILLED ORAL at 09:27

## 2020-01-01 RX ADMIN — ACETAMINOPHEN 650 MG: 325 TABLET ORAL at 17:45

## 2020-01-01 RX ADMIN — SIMETHICONE CHEW TAB 80 MG 80 MG: 80 TABLET ORAL at 21:43

## 2020-01-01 RX ADMIN — KETOROLAC TROMETHAMINE 30 MG: 30 INJECTION, SOLUTION INTRAMUSCULAR at 05:25

## 2020-01-01 RX ADMIN — SODIUM CHLORIDE, SODIUM LACTATE, POTASSIUM CHLORIDE, AND CALCIUM CHLORIDE 125 ML/HR: .6; .31; .03; .02 INJECTION, SOLUTION INTRAVENOUS at 08:05

## 2020-01-01 NOTE — LACTATION NOTE
This note was copied from a baby's chart  CONSULT - LACTATION  Baby Boy Jocelyn Aragon) Reger 1 days male MRN: [id number]    [facility] Room / Bed:  313(N)/ 313(N) Encounter: [id number]    Maternal Information     MOTHER:  Errol Fink  Maternal Age: 26 y o    OB History: #: 1, Date: 09/12/15, Sex: Female, Weight: 3090 g (6 lb 13 oz), GA: 39w0d, Delivery: , Low Transverse, Apgar1: None, Apgar5: None, Living: Living, Birth Comments: None    #: 2, Date: 19, Sex: Male, Weight: 3527 g (7 lb 12 4 oz), GA: 37w6d, Delivery: , Low Transverse, Apgar1: 8, Apgar5: 9, Living: Living, Birth Comments: None       Lactation history:   Has patient previously breast fed: Yes   How long had patient previously breast fed: 6 weeks   Previous breast feeding complications: Low milk supply(extensive lactation support)     Past Surgical History:   Procedure Laterality Date     SECTION  2015    [place]  DELIVERY ONLY N/A 2019    Procedure: Arsh Cameron () REPEAT;  Surgeon: Jose Jones MD;  Location: [facility] ;  Service: Obstetrics       Birth information:  YOB: 2020   Time of birth: 8:57 AM   Sex: male   Delivery type: , Low Transverse   Birth Weight: 3527 g (7 lb 12 4 oz)   Percent of Weight Change: -3%     Gestational Age: 41w10d   [unfilled]    Assessment     Breast and nipple assessment: tubular breasts, history of low milk supply    [place] Assessment: sleepy    Feeding assessment: no latch  LATCH:  Latch: Too sleepy or reluctant, no latch achieved   Audible Swallowing: None   Type of Nipple: Everted (After stimulation)   Comfort (Breast/Nipple): Soft/non-tender   Hold (Positioning): Partial assist, teach one side, mother does other, staff holds   [facility] Score: 5          Feeding recommendations:  breast feed on demand     Lactation rounds done on this dyad   Drew Santiago tearful that Courtney Victoria had lost 3 shikha saying that she knows this is normal, but her last breast feeding experience was "so traumatic"  Encouraged her to take the knowledge of what to look for from breastfeeding her daughter 4 years ago and apply techniques learned from demonstration of hand expression today  Kwasi Feliciano was unable to express milk independently before explaining techniques and was encouraged to see colostrum in multiple large drops after hand expression demonstrated  Kwasi Feliciano questioned how to know when supplementation was needed  Reassured her that the pediatrician was mindful of a 10% weight loss boundary necessitating intervention and that Kwasi Feliciano could also initiate it at any time of her choosing  Encouraged parents to call for assistance, questions, and concerns about breastfeeding  Extension provided      Fortunato Hale RN 1/1/2020 4:38 PM

## 2020-01-01 NOTE — PLAN OF CARE
Problem: PAIN - ADULT  Goal: Verbalizes/displays adequate comfort level or baseline comfort level  Description  Interventions:  - Encourage patient to monitor pain and request assistance  - Assess pain using appropriate pain scale  - Administer analgesics based on type and severity of pain and evaluate response  - Implement non-pharmacological measures as appropriate and evaluate response  - Consider cultural and social influences on pain and pain management  - Notify physician/advanced practitioner if interventions unsuccessful or patient reports new pain  Outcome: Progressing     Problem: INFECTION - ADULT  Goal: Absence or prevention of progression during hospitalization  Description  INTERVENTIONS:  - Assess and monitor for signs and symptoms of infection  - Monitor lab/diagnostic results  - Monitor all insertion sites, i e  indwelling lines, tubes, and drains  - Monitor endotracheal if appropriate and nasal secretions for changes in amount and color  - [place] appropriate cooling/warming therapies per order  - Administer medications as ordered  - Instruct and encourage patient and family to use good hand hygiene technique  - Identify and instruct in appropriate isolation precautions for identified infection/condition  Outcome: Progressing  Goal: Absence of fever/infection during neutropenic period  Description  INTERVENTIONS:  - Monitor WBC    Outcome: Progressing     Problem: SAFETY ADULT  Goal: Patient will remain free of falls  Description  INTERVENTIONS:  - Assess patient frequently for physical needs  -  Identify cognitive and physical deficits and behaviors that affect risk of falls    -  [place] fall precautions as indicated by assessment   - Educate patient/family on patient safety including physical limitations  - Instruct patient to call for assistance with activity based on assessment  - Modify environment to reduce risk of injury  - Consider OT/PT consult to assist with strengthening/mobility  Outcome: Progressing  Goal: Maintain or return to baseline ADL function  Description  INTERVENTIONS:  -  Assess patient's ability to carry out ADLs; assess patient's baseline for ADL function and identify physical deficits which impact ability to perform ADLs (bathing, care of mouth/teeth, toileting, grooming, dressing, etc )  - Assess/evaluate cause of self-care deficits   - Assess range of motion  - Assess patient's mobility; develop plan if impaired  - Assess patient's need for assistive devices and provide as appropriate  - Encourage maximum independence but intervene and supervise when necessary  - Involve family in performance of ADLs  - Assess for home care needs following discharge   - Consider OT consult to assist with ADL evaluation and planning for discharge  - Provide patient education as appropriate  Outcome: Progressing  Goal: Maintain or return mobility status to optimal level  Description  INTERVENTIONS:  - Assess patient's baseline mobility status (ambulation, transfers, stairs, etc )    - Identify cognitive and physical deficits and behaviors that affect mobility  - Identify mobility aids required to assist with transfers and/or ambulation (gait belt, sit-to-stand, lift, walker, cane, etc )  - [place] fall precautions as indicated by assessment  - Record patient progress and toleration of activity level on Mobility SBAR; progress patient to next Phase/Stage  - Instruct patient to call for assistance with activity based on assessment  - Consider rehabilitation consult to assist with strengthening/weightbearing, etc   Outcome: Progressing     Problem: Knowledge Deficit  Goal: Patient/family/caregiver demonstrates understanding of disease process, treatment plan, medications, and discharge instructions  Description  Complete learning assessment and assess knowledge base    Interventions:  - Provide teaching at level of understanding  - Provide teaching via preferred learning methods  Outcome: Progressing     Problem: DISCHARGE PLANNING  Goal: Discharge to home or other facility with appropriate resources  Description  INTERVENTIONS:  - Identify barriers to discharge w/patient and caregiver  - Arrange for needed discharge resources and transportation as appropriate  - Identify discharge learning needs (meds, wound care, etc )  - Arrange for interpretive services to assist at discharge as needed  - Refer to Case Management Department for coordinating discharge planning if the patient needs post-hospital services based on physician/advanced practitioner order or complex needs related to functional status, cognitive ability, or social support system  Outcome: Progressing     Problem: POSTPARTUM  Goal: Experiences normal postpartum course  Description  INTERVENTIONS:  - Monitor maternal vital signs  - Assess uterine involution and lochia  Outcome: Progressing  Goal: Appropriate maternal -  bonding  Description  INTERVENTIONS:  - Identify family support  - Assess for appropriate maternal/infant bonding   -Encourage maternal/infant bonding opportunities  - Referral to  or  as needed  Outcome: Progressing  Goal: Establishment of infant feeding pattern  Description  INTERVENTIONS:  - Assess breast/bottle feeding  - Refer to lactation as needed  Outcome: Progressing  Goal: Incision(s), wounds(s) or drain site(s) healing without S/S of infection  Description  INTERVENTIONS  - Assess and document risk factors for skin impairment   - Assess and document dressing, incision, wound bed, drain sites and surrounding tissue  - Consider nutrition services referral as needed  - Oral mucous membranes remain intact  - Provide patient/ family education  Outcome: Progressing     Problem: ALTERATION IN THE BREAST  Goal: Optimize infant feeding at the breast  Description  INTERVENTIONS:  - Latch, breast and nipple assessment  - Assess prior breast feeding history  - Hand expression of breast milk  - For cracked, bleeding and or sore nipples reassess latch, treat damaged nipple  -Educate mother on feeding cues  -Positioning/latch techniques  Outcome: Progressing     Problem: INADEQUATE LATCH, SUCK OR SWALLOW  Goal: Demonstrate ability to latch and sustain latch, audible swallowing and satiety  Description  INTERVENTIONS:  - Assess oral anatomy, notify Physician/AP for abnormal findings  - Establish milk expression  - Maximize feeding opportunity (skin to skin, behavioral state)  - Position/latch techniques  - Discourage use of pacifier-artificial nipple  - Mechanical pumping  - Nipple Shield  - Supplemental formula feeding (Physician/AP order)  - Alternative feeding method  Outcome: Progressing

## 2020-01-01 NOTE — PROGRESS NOTES
Date: 20  Procedure: Repeat low transverse  section  Postpartum/Postop Day #: 1     SUBJECTIVE:  Pain: no  Tolerating Oral Intake: yes   Voiding: no - mott in place with good urine output  Flatus: yes  Bowel Movement: no  Ambulating: not OOB yet  Breastfeeding: yes  Chest Pain: no  Shortness of Breath: no  Leg Pain/Discomfort: no  Lochia: moderate      OBJECTIVE:   Vitals: Temp:  [97 3 °F (36 3 °C)-98 2 °F (36 8 °C)] 97 9 °F (36 6 °C)  HR:  [66-88] 80  Resp:  [16-18] 16  BP: (129-160)/(71-91) 136/89  General: No Acute Distress  Morbidly obese young white female  Abdomen: Soft, non-distended, non-tender, no rebound, guarding or CVA tenderness   Fundus: Firm & Non-Tender , Fundal Location:    -1 cm below the umbilicus  Incision: clean, dry, and intact and closed with staples  Lower Extremities: Non-tender, SCDs on    LABS / TESTS / MEDICATION:    Recent Results (from the past 72 hour(s))   Type and screen and continue to monitor patient    Collection Time: 19  7:05 AM   Result Value Ref Range    Antibody Screen Negative     Specimen Expiration Date 2020    CBC and differential    Collection Time: 19  7:05 AM   Result Value Ref Range    WBC 9 07 4 31 - 10 16 Thousand/uL    RBC 4 24 3 81 - 5 12 Million/uL    Hemoglobin 12 4 11 5 - 15 4 g/dL    Hematocrit 36 3 34 8 - 46 1 %    MCV 86 82 - 98 fL    MCH 29 2 26 8 - 34 3 pg    MCHC 34 2 31 4 - 37 4 g/dL    RDW 12 9 11 6 - 15 1 %    MPV 13 2 (H) 8 9 - 12 7 fL    Platelets 076 243 - 570 Thousands/uL    nRBC 0 /100 WBCs    Neutrophils Relative 70 43 - 75 %    Immat GRANS % 1 0 - 2 %    Lymphocytes Relative 20 14 - 44 %    Monocytes Relative 8 4 - 12 %    Eosinophils Relative 1 0 - 6 %    Basophils Relative 0 0 - 1 %    Neutrophils Absolute 6 39 1 85 - 7 62 Thousands/µL    Immature Grans Absolute 0 07 0 00 - 0 20 Thousand/uL    Lymphocytes Absolute 1 77 0 60 - 4 47 Thousands/µL    Monocytes Absolute 0 69 0 17 - 1 22 Thousand/µL    Eosinophils Absolute 0 11 0 00 - 0 61 Thousand/µL    Basophils Absolute 0 04 0 00 - 0 10 Thousands/µL   Blood gas, venous, cord    Collection Time: 12/31/19  8:58 AM   Result Value Ref Range    pH, Cord Stanford 7 331 7 190 - 7 490    pCO2, Cord Stanford 47 8 (H) 27 0 - 43 0 mm HG    pO2, Cord Stanford 21 5 15 0 - 45 0 mm HG    HCO3, Cord Stanford 24 7 12 2 - 28 6 mmol/L    Base Exc, Cord Stanford -1 8 (L) 1 0 - 9 0 mmol/L    O2 Cont, Cord Stanford 12 4 mL/dL    O2 HGB,VENOUS CORD 47 8 %   Blood gas, arterial, cord    Collection Time: 12/31/19  8:58 AM   Result Value Ref Range    pH, Cord Art 7 217 (L) 7 230 - 7 430    pCO2, Cord Art 67 7 (H) 30 0 - 60 0    pO2, Cord Art 10 8 5 0 - 25 0 mm HG    HCO3, Cord Art 26 9 17 3 - 27 3 mmol/L    Base Exc, Cord Art -3 0 (L) 3 0 - 11 0 mmol/L    O2 Content, Cord Art 2 7 ml/dl    O2 Hgb, Arterial Cord 10 5 %   Rapid HIV 1/2 AB-AG Combo    Collection Time: 12/31/19  9:09 AM   Result Value Ref Range    Rapid HIV 1 AND 2 Non-Reactive Non-Reactive    HIV-1 P24 Ag Screen Non-Reactive Non-Reactive   CBC    Collection Time: 01/01/20  5:22 AM   Result Value Ref Range    WBC 8 24 4 31 - 10 16 Thousand/uL    RBC 3 32 (L) 3 81 - 5 12 Million/uL    Hemoglobin 9 6 (L) 11 5 - 15 4 g/dL    Hematocrit 28 8 (L) 34 8 - 46 1 %    MCV 87 82 - 98 fL    MCH 28 9 26 8 - 34 3 pg    MCHC 33 3 31 4 - 37 4 g/dL    RDW 12 7 11 6 - 15 1 %    Platelets 544 (L) 891 - 390 Thousands/uL    MPV 12 5 8 9 - 12 7 fL         docusate sodium 100 mg Oral BID   pantoprazole 40 mg Oral Daily       acetaminophen 650 mg Q6H PRN   acetaminophen 650 mg Q4H PRN   calcium carbonate 1,000 mg Daily PRN   fentaNYL 25 mcg Q3 min PRN   HYDROmorphone 0 2 mg Q5 Min PRN   HYDROmorphone 1 mg Q2H PRN   ibuprofen 600 mg Q6H PRN   ketorolac 30 mg Q6H PRN   ondansetron 4 mg Q8H PRN   oxyCODONE-acetaminophen 1 tablet Q4H PRN   oxyCODONE-acetaminophen 2 tablet Q4H PRN   promethazine 25 mg Once PRN   witch hazel-glycerin 1 pad Q4H PRN       ASSESSMENT AND PLAN:   26 y o  [id number] s/p , due to repeat, gestational hypertension post-operative day 1    Good recovery to date  Continue current postpartum / postoperative care plan   Encourage ambulation and advance diet    Signature / Title: Yo Dempsey MD, Ob/Gyn  Date: 2020  Time: 6:30 AM

## 2020-01-02 PROCEDURE — 99024 POSTOP FOLLOW-UP VISIT: CPT | Performed by: OBSTETRICS & GYNECOLOGY

## 2020-01-02 RX ADMIN — OXYCODONE HYDROCHLORIDE AND ACETAMINOPHEN 2 TABLET: 5; 325 TABLET ORAL at 20:32

## 2020-01-02 RX ADMIN — IBUPROFEN 600 MG: 600 TABLET ORAL at 17:57

## 2020-01-02 RX ADMIN — SIMETHICONE CHEW TAB 80 MG 80 MG: 80 TABLET ORAL at 16:35

## 2020-01-02 RX ADMIN — ACETAMINOPHEN 650 MG: 325 TABLET ORAL at 10:06

## 2020-01-02 RX ADMIN — DOCUSATE SODIUM 100 MG: 100 CAPSULE, LIQUID FILLED ORAL at 09:58

## 2020-01-02 RX ADMIN — DOCUSATE SODIUM 100 MG: 100 CAPSULE, LIQUID FILLED ORAL at 17:50

## 2020-01-02 RX ADMIN — SIMETHICONE CHEW TAB 80 MG 80 MG: 80 TABLET ORAL at 03:34

## 2020-01-02 RX ADMIN — OXYCODONE HYDROCHLORIDE AND ACETAMINOPHEN 1 TABLET: 5; 325 TABLET ORAL at 14:20

## 2020-01-02 RX ADMIN — OXYCODONE HYDROCHLORIDE AND ACETAMINOPHEN 2 TABLET: 5; 325 TABLET ORAL at 03:33

## 2020-01-02 NOTE — PLAN OF CARE
Problem: PAIN - ADULT  Goal: Verbalizes/displays adequate comfort level or baseline comfort level  Description  Interventions:  - Encourage patient to monitor pain and request assistance  - Assess pain using appropriate pain scale  - Administer analgesics based on type and severity of pain and evaluate response  - Implement non-pharmacological measures as appropriate and evaluate response  - Consider cultural and social influences on pain and pain management  - Notify physician/advanced practitioner if interventions unsuccessful or patient reports new pain  Outcome: Progressing     Problem: INFECTION - ADULT  Goal: Absence or prevention of progression during hospitalization  Description  INTERVENTIONS:  - Assess and monitor for signs and symptoms of infection  - Monitor lab/diagnostic results  - Monitor all insertion sites, i e  indwelling lines, tubes, and drains  - Monitor endotracheal if appropriate and nasal secretions for changes in amount and color  - [place] appropriate cooling/warming therapies per order  - Administer medications as ordered  - Instruct and encourage patient and family to use good hand hygiene technique  - Identify and instruct in appropriate isolation precautions for identified infection/condition  Outcome: Progressing  Goal: Absence of fever/infection during neutropenic period  Description  INTERVENTIONS:  - Monitor WBC    Outcome: Progressing     Problem: SAFETY ADULT  Goal: Patient will remain free of falls  Description  INTERVENTIONS:  - Assess patient frequently for physical needs  -  Identify cognitive and physical deficits and behaviors that affect risk of falls    -  [place] fall precautions as indicated by assessment   - Educate patient/family on patient safety including physical limitations  - Instruct patient to call for assistance with activity based on assessment  - Modify environment to reduce risk of injury  - Consider OT/PT consult to assist with strengthening/mobility  Outcome: Progressing  Goal: Maintain or return to baseline ADL function  Description  INTERVENTIONS:  -  Assess patient's ability to carry out ADLs; assess patient's baseline for ADL function and identify physical deficits which impact ability to perform ADLs (bathing, care of mouth/teeth, toileting, grooming, dressing, etc )  - Assess/evaluate cause of self-care deficits   - Assess range of motion  - Assess patient's mobility; develop plan if impaired  - Assess patient's need for assistive devices and provide as appropriate  - Encourage maximum independence but intervene and supervise when necessary  - Involve family in performance of ADLs  - Assess for home care needs following discharge   - Consider OT consult to assist with ADL evaluation and planning for discharge  - Provide patient education as appropriate  Outcome: Progressing  Goal: Maintain or return mobility status to optimal level  Description  INTERVENTIONS:  - Assess patient's baseline mobility status (ambulation, transfers, stairs, etc )    - Identify cognitive and physical deficits and behaviors that affect mobility  - Identify mobility aids required to assist with transfers and/or ambulation (gait belt, sit-to-stand, lift, walker, cane, etc )  - [place] fall precautions as indicated by assessment  - Record patient progress and toleration of activity level on Mobility SBAR; progress patient to next Phase/Stage  - Instruct patient to call for assistance with activity based on assessment  - Consider rehabilitation consult to assist with strengthening/weightbearing, etc   Outcome: Progressing     Problem: Knowledge Deficit  Goal: Patient/family/caregiver demonstrates understanding of disease process, treatment plan, medications, and discharge instructions  Description  Complete learning assessment and assess knowledge base    Interventions:  - Provide teaching at level of understanding  - Provide teaching via preferred learning methods  Outcome: Progressing     Problem: DISCHARGE PLANNING  Goal: Discharge to home or other facility with appropriate resources  Description  INTERVENTIONS:  - Identify barriers to discharge w/patient and caregiver  - Arrange for needed discharge resources and transportation as appropriate  - Identify discharge learning needs (meds, wound care, etc )  - Arrange for interpretive services to assist at discharge as needed  - Refer to Case Management Department for coordinating discharge planning if the patient needs post-hospital services based on physician/advanced practitioner order or complex needs related to functional status, cognitive ability, or social support system  Outcome: Progressing     Problem: POSTPARTUM  Goal: Experiences normal postpartum course  Description  INTERVENTIONS:  - Monitor maternal vital signs  - Assess uterine involution and lochia  Outcome: Progressing  Goal: Appropriate maternal -  bonding  Description  INTERVENTIONS:  - Identify family support  - Assess for appropriate maternal/infant bonding   -Encourage maternal/infant bonding opportunities  - Referral to  or  as needed  Outcome: Progressing  Goal: Establishment of infant feeding pattern  Description  INTERVENTIONS:  - Assess breast/bottle feeding  - Refer to lactation as needed  Outcome: Progressing  Goal: Incision(s), wounds(s) or drain site(s) healing without S/S of infection  Description  INTERVENTIONS  - Assess and document risk factors for skin impairment   - Assess and document dressing, incision, wound bed, drain sites and surrounding tissue  - Consider nutrition services referral as needed  - Oral mucous membranes remain intact  - Provide patient/ family education  Outcome: Progressing     Problem: ALTERATION IN THE BREAST  Goal: Optimize infant feeding at the breast  Description  INTERVENTIONS:  - Latch, breast and nipple assessment  - Assess prior breast feeding history  - Hand expression of breast milk  - For cracked, bleeding and or sore nipples reassess latch, treat damaged nipple  -Educate mother on feeding cues  -Positioning/latch techniques  Outcome: Progressing     Problem: INADEQUATE LATCH, SUCK OR SWALLOW  Goal: Demonstrate ability to latch and sustain latch, audible swallowing and satiety  Description  INTERVENTIONS:  - Assess oral anatomy, notify Physician/AP for abnormal findings  - Establish milk expression  - Maximize feeding opportunity (skin to skin, behavioral state)  - Position/latch techniques  - Discourage use of pacifier-artificial nipple  - Mechanical pumping  - Nipple Shield  - Supplemental formula feeding (Physician/AP order)  - Alternative feeding method  Outcome: Progressing

## 2020-01-02 NOTE — UTILIZATION REVIEW
Notification of Maternity/Delivery & [place] Birth Information for Admission   Notification of Maternity/Delivery for Admission to our facility [address]  Be advised that this patient was admitted to our facility under Inpatient Status  Contact Carli Gómez at [phone] for additional admission information  [facility] PARENT/CHILD HE[facility] DEPT DEDICATED Julio Kin [phone]  Mother &  Information   Patient Name: Carlos Benedict   YOB: 1990   Delivering clinician:     OB History        2    Para   2    Term   2            AB        Living   2       SAB        TAB        Ectopic        Multiple   0    Live Births   2               [place] Name & MRN:   Information for the patient's :  Hermes Rodríguez Boy Maybelle Elliott) [[id number]]     [place] Delivery Information:  Sex: male  Delivered 2019 8:57 AM by , Low Transverse; Gestational Age: 41w10d    [place] Measurements:  Weight: 7 lb 12 4 oz (3527 g); Height: 19"    APGAR 1 minute 5 minutes 10 minutes   Totals: 8 9      [place] Birth Information: 26 y o  female MRN: [id number] Unit/Bed#: -01 Estimated Date of Delivery: 1/15/20  Birthweight: No birth weight on file  Gestational Age: <None> Delivery Type: , Low Transverse          APGARS  One minute Five minutes Ten minutes   Totals:                 [address]   [address]:   [facility]  Tax ID: [id number]  NPI: [id number] Attending Provider/NPI: Dayday Spivey Md [[id number]]   Place of Service Code: 24     Place of Service Name:  [address] [place] [address]   Start Date: 19 IPADMITTIME@     Discharge Date & Time: No discharge date for patient encounter      Type of Admission: Inpatient Status Discharge Disposition (if discharged): Home/Self Care   Patient Diagnoses:   Previous  section complicating pregnancy [C59 966]  Gestational hypertension, third trimester [O13 3]  37 weeks gestation of pregnancy [Z3A 37]  The primary encounter diagnosis was History of  delivery  Diagnoses of Prenatal care, subsequent pregnancy, third trimester, Previous  section complicating pregnancy, and Gestational hypertension, third trimester were also pertinent to this visit  1  History of  delivery    2  Prenatal care, subsequent pregnancy, third trimester    3  Previous  section complicating pregnancy    4  Gestational hypertension, third trimester       Orders: Admission Orders (From admission, onward)     Ordered        19 0702  Inpatient Admission  Once                    Assigned Utilization Review Contact: Dustin Jay  Utilization   Network Utilization Review Department  Phone: [phone]; Fax [phone]  Email: [email or web address]

## 2020-01-02 NOTE — PROGRESS NOTES
Progress Note - OB/GYN  Ean Rodríguez 26 y o  female MRN: [id number]  Unit/Bed#:  313-01 Encounter: [id number]      Assessment:  Postop Day #2 s/p RLTCS, stable, baby in the room    Plan:    1) Postoperative care  -Hgb 12 4g/dL --> 9 6  -Urinary output adequate  -Encourage ambulation  -Encourage breastfeeding/pumping   -Anticipate discharge PPD#3    2) Gestational HTN  -BP's have ranged 120/73 - 159/69 over past 24 hours  -Preeclampsia labs WNL  -May consider starting an oral hypertensive agent       Chief Complaint:    Post delivery  Patient is doing well  Lochia WNL  Pain well controlled  Patient reports some gas pain that refers to the shoulder and is taking Simethicone  Subjective    Pain: well controlled  Tolerating PO: yes  Voiding: yes  Flatus: yes  BM: no  Ambulating: yes  Breastfeeding:  yes  Chest pain: no  Shortness of breath: no  Leg pain: no  Lochia: minimal    Vitals:   /77 (BP Location: Right arm) Comment: Notified nurse   Pulse 78   Temp 97 6 °F (36 4 °C) (Oral)   Resp 18   Ht 6' 1" (1 854 m)   Wt 135 kg (297 lb)   LMP 04/10/2019   SpO2 99%   Breastfeeding?  Yes   BMI 39 18 kg/m²       Intake/Output Summary (Last 24 hours) at 1/2/2020 8865  Last data filed at 1/1/2020 1309  Gross per 24 hour   Intake 700 ml   Output 2450 ml   Net -1750 ml       Invasive Devices     Peripheral Intravenous Line            Peripheral IV (Ped) 12/31/19 Right Wrist 2 days                Physical Exam:   GEN: [place] Nose appears well, alert and oriented x 3, pleasant and cooperative   ABDOMEN: soft, no tenderness, no distention, fundus @ umbilicus, Incision C/D/I  EXTREMITIES: SCDs off, no leg pain or tenderness      Labs:   Admission on 12/31/2019   Component Date Value    ABO Grouping 12/31/2019 O     Rh Factor 12/31/2019 Positive     Antibody Screen 12/31/2019 Negative     Specimen Expiration Date 12/31/2019 [id number]     WBC 12/31/2019 9 07     RBC 12/31/2019 4 24     Hemoglobin 2019 12 4     Hematocrit 2019 36 3     MCV 2019 86     MCH 2019 29 2     MCHC 2019 34 2     RDW 2019 12 9     MPV 2019 13 2*    Platelets  215     nRBC 2019 0     Neutrophils Relative 2019 70     Immat GRANS % 2019 1     Lymphocytes Relative 2019 20     Monocytes Relative 2019 8     Eosinophils Relative 2019 1     Basophils Relative 2019 0     Neutrophils Absolute 2019 6 39     Immature Grans Absolute 2019 0 07     Lymphocytes Absolute 2019 1 77     Monocytes Absolute 2019 0 69     Eosinophils Absolute 2019 0 11     Basophils Absolute 2019 0 04     Strep Group B Ag 2019 Negative     Rapid HIV 1 AND 2 2019 Non-Reactive     HIV-1 P24 Ag Screen 2019 Non-Reactive     pH, Cord Stanford 2019 7  331     pCO2, Cord Stanford 2019 47 8*    pO2, Cord Stanford 2019 21 5     HCO3, Cord Stanford 2019 24 7    [facility] Exc, Cord Stanford 2019 -1 8*    O2 Cont, Cord Stanford 2019 12 4     O2 HGB,VENOUS CORD 2019 47 8     pH, Cord Art 2019 7 217*    pCO2, Cord Art 2019 67 7*    pO2, Cord Art 2019 10 8     HCO3, Cord Art 2019 26 9     Base Exc, Cord Art 2019 -3 0*    O2 Content, Cord Art 2019 2 7     O2 Hgb, Arterial Cord 2019 10 5     WBC 2020 8 24     RBC 2020 3 32*    Hemoglobin 2020 9 6*    Hematocrit 2020 28 8*    MCV 2020 87     MCH 2020 28 9     MCHC 2020 33 3     RDW 2020 12 7     Platelets  133*    MPV 2020 12 5          Patient Active Problem List   Diagnosis    Obesity affecting pregnancy, antepartum    Pseudomonas urinary tract infection    History of  delivery    Elevated blood pressure affecting pregnancy, antepartum    Prenatal care, subsequent pregnancy, third trimester    37 weeks gestation of pregnancy    Maternal morbid obesity in third trimester, antepartum ([facility] [address] )    Gestational hypertension    Depression    Migraine    Eczema    Obesity          Enrique Price MD  1/2/2020  7:27 AM

## 2020-01-03 PROBLEM — Z98.891 S/P REPEAT LOW TRANSVERSE C-SECTION: Status: ACTIVE | Noted: 2020-01-03

## 2020-01-03 PROCEDURE — 99024 POSTOP FOLLOW-UP VISIT: CPT | Performed by: OBSTETRICS & GYNECOLOGY

## 2020-01-03 RX ADMIN — IBUPROFEN 600 MG: 600 TABLET ORAL at 22:18

## 2020-01-03 RX ADMIN — CALCIUM CARBONATE (ANTACID) CHEW TAB 500 MG 1000 MG: 500 CHEW TAB at 18:15

## 2020-01-03 RX ADMIN — IBUPROFEN 600 MG: 600 TABLET ORAL at 08:01

## 2020-01-03 RX ADMIN — DOCUSATE SODIUM 100 MG: 100 CAPSULE, LIQUID FILLED ORAL at 08:01

## 2020-01-03 RX ADMIN — OXYCODONE HYDROCHLORIDE AND ACETAMINOPHEN 2 TABLET: 5; 325 TABLET ORAL at 02:47

## 2020-01-03 RX ADMIN — ACETAMINOPHEN 650 MG: 325 TABLET ORAL at 16:59

## 2020-01-03 NOTE — PLAN OF CARE
Problem: PAIN - ADULT  Goal: Verbalizes/displays adequate comfort level or baseline comfort level  Description  Interventions:  - Encourage patient to monitor pain and request assistance  - Assess pain using appropriate pain scale  - Administer analgesics based on type and severity of pain and evaluate response  - Implement non-pharmacological measures as appropriate and evaluate response  - Consider cultural and social influences on pain and pain management  - Notify physician/advanced practitioner if interventions unsuccessful or patient reports new pain  Outcome: Progressing     Problem: INFECTION - ADULT  Goal: Absence or prevention of progression during hospitalization  Description  INTERVENTIONS:  - Assess and monitor for signs and symptoms of infection  - Monitor lab/diagnostic results  - Monitor all insertion sites, i e  indwelling lines, tubes, and drains  - Monitor endotracheal if appropriate and nasal secretions for changes in amount and color  - [place] appropriate cooling/warming therapies per order  - Administer medications as ordered  - Instruct and encourage patient and family to use good hand hygiene technique  - Identify and instruct in appropriate isolation precautions for identified infection/condition  Outcome: Progressing  Goal: Absence of fever/infection during neutropenic period  Description  INTERVENTIONS:  - Monitor WBC    Outcome: Progressing     Problem: SAFETY ADULT  Goal: Patient will remain free of falls  Description  INTERVENTIONS:  - Assess patient frequently for physical needs  -  Identify cognitive and physical deficits and behaviors that affect risk of falls    -  [place] fall precautions as indicated by assessment   - Educate patient/family on patient safety including physical limitations  - Instruct patient to call for assistance with activity based on assessment  - Modify environment to reduce risk of injury  - Consider OT/PT consult to assist with strengthening/mobility  Outcome: Progressing  Goal: Maintain or return to baseline ADL function  Description  INTERVENTIONS:  -  Assess patient's ability to carry out ADLs; assess patient's baseline for ADL function and identify physical deficits which impact ability to perform ADLs (bathing, care of mouth/teeth, toileting, grooming, dressing, etc )  - Assess/evaluate cause of self-care deficits   - Assess range of motion  - Assess patient's mobility; develop plan if impaired  - Assess patient's need for assistive devices and provide as appropriate  - Encourage maximum independence but intervene and supervise when necessary  - Involve family in performance of ADLs  - Assess for home care needs following discharge   - Consider OT consult to assist with ADL evaluation and planning for discharge  - Provide patient education as appropriate  Outcome: Progressing  Goal: Maintain or return mobility status to optimal level  Description  INTERVENTIONS:  - Assess patient's baseline mobility status (ambulation, transfers, stairs, etc )    - Identify cognitive and physical deficits and behaviors that affect mobility  - Identify mobility aids required to assist with transfers and/or ambulation (gait belt, sit-to-stand, lift, walker, cane, etc )  - [place] fall precautions as indicated by assessment  - Record patient progress and toleration of activity level on Mobility SBAR; progress patient to next Phase/Stage  - Instruct patient to call for assistance with activity based on assessment  - Consider rehabilitation consult to assist with strengthening/weightbearing, etc   Outcome: Progressing     Problem: Knowledge Deficit  Goal: Patient/family/caregiver demonstrates understanding of disease process, treatment plan, medications, and discharge instructions  Description  Complete learning assessment and assess knowledge base    Interventions:  - Provide teaching at level of understanding  - Provide teaching via preferred learning methods  Outcome: Progressing     Problem: DISCHARGE PLANNING  Goal: Discharge to home or other facility with appropriate resources  Description  INTERVENTIONS:  - Identify barriers to discharge w/patient and caregiver  - Arrange for needed discharge resources and transportation as appropriate  - Identify discharge learning needs (meds, wound care, etc )  - Arrange for interpretive services to assist at discharge as needed  - Refer to Case Management Department for coordinating discharge planning if the patient needs post-hospital services based on physician/advanced practitioner order or complex needs related to functional status, cognitive ability, or social support system  Outcome: Progressing     Problem: POSTPARTUM  Goal: Experiences normal postpartum course  Description  INTERVENTIONS:  - Monitor maternal vital signs  - Assess uterine involution and lochia  Outcome: Progressing  Goal: Appropriate maternal -  bonding  Description  INTERVENTIONS:  - Identify family support  - Assess for appropriate maternal/infant bonding   -Encourage maternal/infant bonding opportunities  - Referral to  or  as needed  Outcome: Progressing  Goal: Establishment of infant feeding pattern  Description  INTERVENTIONS:  - Assess breast/bottle feeding  - Refer to lactation as needed  Outcome: Progressing  Goal: Incision(s), wounds(s) or drain site(s) healing without S/S of infection  Description  INTERVENTIONS  - Assess and document risk factors for skin impairment   - Assess and document dressing, incision, wound bed, drain sites and surrounding tissue  - Consider nutrition services referral as needed  - Oral mucous membranes remain intact  - Provide patient/ family education  Outcome: Progressing     Problem: ALTERATION IN THE BREAST  Goal: Optimize infant feeding at the breast  Description  INTERVENTIONS:  - Latch, breast and nipple assessment  - Assess prior breast feeding history  - Hand expression of breast milk  - For cracked, bleeding and or sore nipples reassess latch, treat damaged nipple  -Educate mother on feeding cues  -Positioning/latch techniques  Outcome: Progressing     Problem: INADEQUATE LATCH, SUCK OR SWALLOW  Goal: Demonstrate ability to latch and sustain latch, audible swallowing and satiety  Description  INTERVENTIONS:  - Assess oral anatomy, notify Physician/AP for abnormal findings  - Establish milk expression  - Maximize feeding opportunity (skin to skin, behavioral state)  - Position/latch techniques  - Discourage use of pacifier-artificial nipple  - Mechanical pumping  - Nipple Shield  - Supplemental formula feeding (Physician/AP order)  - Alternative feeding method  Outcome: Progressing

## 2020-01-03 NOTE — PROGRESS NOTES
Progress Note - OB/GYN   Deena Pool Regroel 26 y o  female MRN: [id number]  Unit/Bed#: -01 Encounter: [id number]    Assessment:  Post partum Day #3 s/p LTCS, stable, baby in nursery  Hb Pre-surgery 12 4 , Hb Post-surgery 9 6  Passed her voiding trial  Term pregnancy   Single male viable fetus  Pregnancy complicated by :    1  Gestational hypertension  -Protein creatinine ratio 0 09, blood pressures today 140/78 146/77 136/78 131/66, patient is asymptomatic  Plan:  Continue routine post partum care  Encourage ambulation  Encourage breastfeeding  Anticipate discharge 72 hours after delivery if not complications     Subjective/Objective   Chief Complaint:     Post delivery  Patient is doing well  Lochia WNL  Pain well controlled  Subjective:     Pain: yes, cramping, improved with meds  Tolerating PO: yes  Voiding: yes  Flatus: yes  BM: no  Ambulating: yes  Chest pain: no  Shortness of breath: no  Leg pain: no  Lochia: minimal    Objective:     Vitals: /66 (BP Location: Right arm)   Pulse 77   Temp 97 9 °F (36 6 °C) (Oral)   Resp 18   Ht 6' 1" (1 854 m)   Wt 135 kg (297 lb)   LMP 04/10/2019   SpO2 98%   Breastfeeding? Yes Comment: and supplementing  BMI 39 18 kg/m²     No intake or output data in the 24 hours ending 01/03/20 0627    Lab Results   Component Value Date    WBC 8 24 01/01/2020    HGB 9 6 (L) 01/01/2020    HCT 28 8 (L) 01/01/2020    MCV 87 01/01/2020     (L) 01/01/2020       Physical Exam:     Gen: AAOx3, NAD  CV: RRR  Lungs: CTA b/l  Abd: Soft, non-tender, non-distended, no rebound or guarding  Uterine fundus firm and non-tender, 1 cm below the umbilicus  Surgical incision without irritative signs, no bleeding, no edema     Ext: Non tender    Heide Sepulveda MD  1/3/2020  6:27 AM

## 2020-01-03 NOTE — PLAN OF CARE
Problem: DISCHARGE PLANNING  Goal: Discharge to home or other facility with appropriate resources  Description  INTERVENTIONS:  - Identify barriers to discharge w/patient and caregiver  - Arrange for needed discharge resources and transportation as appropriate  - Identify discharge learning needs (meds, wound care, etc )  - Arrange for interpretive services to assist at discharge as needed  - Refer to Case Management Department for coordinating discharge planning if the patient needs post-hospital services based on physician/advanced practitioner order or complex needs related to functional status, cognitive ability, or social support system  Outcome: Progressing     Problem: POSTPARTUM  Goal: Establishment of infant feeding pattern  Description  INTERVENTIONS:  - Assess breast/bottle feeding  - Refer to lactation as needed  Outcome: Progressing     Problem: INADEQUATE LATCH, SUCK OR SWALLOW  Goal: Demonstrate ability to latch and sustain latch, audible swallowing and satiety  Description  INTERVENTIONS:  - Assess oral anatomy, notify Physician/AP for abnormal findings  - Establish milk expression  - Maximize feeding opportunity (skin to skin, behavioral state)  - Position/latch techniques  - Discourage use of pacifier-artificial nipple  - Mechanical pumping  - Nipple Shield  - Supplemental formula feeding (Physician/AP order)  - Alternative feeding method  Outcome: Progressing

## 2020-01-04 VITALS
HEIGHT: 72 IN | DIASTOLIC BLOOD PRESSURE: 92 MMHG | SYSTOLIC BLOOD PRESSURE: 148 MMHG | OXYGEN SATURATION: 98 % | TEMPERATURE: 98.2 F | RESPIRATION RATE: 20 BRPM | WEIGHT: 293 LBS | BODY MASS INDEX: 39.68 KG/M2 | HEART RATE: 93 BPM

## 2020-01-04 PROBLEM — G43.909 MIGRAINE: Status: RESOLVED | Noted: 2019-12-31 | Resolved: 2020-01-04

## 2020-01-04 PROBLEM — O16.9 ELEVATED BLOOD PRESSURE AFFECTING PREGNANCY, ANTEPARTUM: Status: RESOLVED | Noted: 2019-09-27 | Resolved: 2020-01-04

## 2020-01-04 PROBLEM — B96.5 PSEUDOMONAS URINARY TRACT INFECTION: Status: RESOLVED | Noted: 2019-07-19 | Resolved: 2020-01-04

## 2020-01-04 PROBLEM — O99.213 MATERNAL MORBID OBESITY IN THIRD TRIMESTER, ANTEPARTUM (HCC): Status: RESOLVED | Noted: 2019-12-23 | Resolved: 2020-01-04

## 2020-01-04 PROBLEM — L30.9 ECZEMA: Status: RESOLVED | Noted: 2019-12-31 | Resolved: 2020-01-04

## 2020-01-04 PROBLEM — N39.0 PSEUDOMONAS URINARY TRACT INFECTION: Status: RESOLVED | Noted: 2019-07-19 | Resolved: 2020-01-04

## 2020-01-04 PROBLEM — Z34.83 PRENATAL CARE, SUBSEQUENT PREGNANCY, THIRD TRIMESTER: Status: RESOLVED | Noted: 2019-10-31 | Resolved: 2020-01-04

## 2020-01-04 PROBLEM — E66.01 MATERNAL MORBID OBESITY IN THIRD TRIMESTER, ANTEPARTUM (HCC): Status: RESOLVED | Noted: 2019-12-23 | Resolved: 2020-01-04

## 2020-01-04 LAB — RPR SER QL: NORMAL

## 2020-01-04 PROCEDURE — 99024 POSTOP FOLLOW-UP VISIT: CPT | Performed by: OBSTETRICS & GYNECOLOGY

## 2020-01-04 RX ORDER — ACETAMINOPHEN 325 MG/1
650 TABLET ORAL EVERY 4 HOURS PRN
Qty: 30 TABLET | Refills: 0
Start: 2020-01-04 | End: 2020-05-27

## 2020-01-04 RX ORDER — OXYCODONE HYDROCHLORIDE AND ACETAMINOPHEN 5; 325 MG/1; MG/1
1 TABLET ORAL EVERY 6 HOURS PRN
Qty: 12 TABLET | Refills: 0 | Status: SHIPPED | OUTPATIENT
Start: 2020-01-04 | End: 2020-05-27

## 2020-01-04 RX ORDER — DOCUSATE SODIUM 100 MG/1
100 CAPSULE, LIQUID FILLED ORAL 2 TIMES DAILY
Qty: 10 CAPSULE | Refills: 0
Start: 2020-01-04 | End: 2020-05-27

## 2020-01-04 RX ORDER — IBUPROFEN 600 MG/1
600 TABLET ORAL EVERY 6 HOURS PRN
Qty: 30 TABLET | Refills: 0
Start: 2020-01-04 | End: 2020-05-27

## 2020-01-04 RX ADMIN — DOCUSATE SODIUM 100 MG: 100 CAPSULE, LIQUID FILLED ORAL at 09:14

## 2020-01-04 RX ADMIN — IBUPROFEN 600 MG: 600 TABLET ORAL at 07:38

## 2020-01-04 RX ADMIN — PANTOPRAZOLE SODIUM 40 MG: 40 TABLET, DELAYED RELEASE ORAL at 09:13

## 2020-01-04 NOTE — DISCHARGE SUMMARY
Discharge Summary - OB/GYN   Lenny Rodríguez 26 y o  female MRN: [id number]  Unit/Bed#: -01 Encounter: [id number]      Admission Date: 2019     Discharge Date: 2020    Admitting Diagnosis:   1  Pregnancy at 37w6d  2  Gestational hypertension    Discharge Diagnosis:   Same, delivered      Procedures: repeat  section, low transverse incision    Attending: Galdino Oh MD    Hospital Course:     Emmanuelle Fall is a 26 y o  Malen Kj at 37w6d wks who was initially admitted for repeat  section, in addition patient with diagnostic of gestational hypertension, BP's between 130-140/70-80,  protein creatinine ratio 0 09, patient was asymtomatic  She delivered a viable male  on 19 at [phone]  Weight 7lbs 12 4oz via repeat  section, low transverse incision  Apgars were 8 (1 min) and 9 (5 min)   was transferred to  nursery  Patient tolerated the procedure well and was transferred to recovery in stable condition  Her post-operative course was uncomplicated  Preoperative hemaglobin was 12 4, postoperative was 9 6  Her postoperative pain was well controlled with oral analgesics  On day of discharge, she was ambulating and able to reasonably perform all ADLs  She was voiding and had appropriate bowel function  Pain was well controlled  She was discharged home on post-operative day #4 without complications  Patient was instructed to follow up with her OB as an outpatient and was given appropriate warnings to call provider if she develops signs of infection or uncontrolled pain  Complications: none apparent    Condition at discharge: good     Discharge instructions/Information to patient and family:   See after visit summary for information provided to patient and family  Provisions for Follow-Up Care:  See after visit summary for information related to follow-up care and any pertinent home health orders        Disposition: Home    Planned Readmission: No    Discharge Medications: For a complete list of the patient's medications, please refer to her med rec      Eduin Garrison MD

## 2020-01-04 NOTE — PLAN OF CARE
Problem: PAIN - ADULT  Goal: Verbalizes/displays adequate comfort level or baseline comfort level  Description  Interventions:  - Encourage patient to monitor pain and request assistance  - Assess pain using appropriate pain scale  - Administer analgesics based on type and severity of pain and evaluate response  - Implement non-pharmacological measures as appropriate and evaluate response  - Consider cultural and social influences on pain and pain management  - Notify physician/advanced practitioner if interventions unsuccessful or patient reports new pain  Outcome: Progressing     Problem: INFECTION - ADULT  Goal: Absence or prevention of progression during hospitalization  Description  INTERVENTIONS:  - Assess and monitor for signs and symptoms of infection  - Monitor lab/diagnostic results  - Monitor all insertion sites, i e  indwelling lines, tubes, and drains  - Monitor endotracheal if appropriate and nasal secretions for changes in amount and color  - [place] appropriate cooling/warming therapies per order  - Administer medications as ordered  - Instruct and encourage patient and family to use good hand hygiene technique  - Identify and instruct in appropriate isolation precautions for identified infection/condition  Outcome: Progressing  Goal: Absence of fever/infection during neutropenic period  Description  INTERVENTIONS:  - Monitor WBC    Outcome: Progressing     Problem: SAFETY ADULT  Goal: Patient will remain free of falls  Description  INTERVENTIONS:  - Assess patient frequently for physical needs  -  Identify cognitive and physical deficits and behaviors that affect risk of falls    -  [place] fall precautions as indicated by assessment   - Educate patient/family on patient safety including physical limitations  - Instruct patient to call for assistance with activity based on assessment  - Modify environment to reduce risk of injury  - Consider OT/PT consult to assist with strengthening/mobility  Outcome: Progressing  Goal: Maintain or return to baseline ADL function  Description  INTERVENTIONS:  -  Assess patient's ability to carry out ADLs; assess patient's baseline for ADL function and identify physical deficits which impact ability to perform ADLs (bathing, care of mouth/teeth, toileting, grooming, dressing, etc )  - Assess/evaluate cause of self-care deficits   - Assess range of motion  - Assess patient's mobility; develop plan if impaired  - Assess patient's need for assistive devices and provide as appropriate  - Encourage maximum independence but intervene and supervise when necessary  - Involve family in performance of ADLs  - Assess for home care needs following discharge   - Consider OT consult to assist with ADL evaluation and planning for discharge  - Provide patient education as appropriate  Outcome: Progressing  Goal: Maintain or return mobility status to optimal level  Description  INTERVENTIONS:  - Assess patient's baseline mobility status (ambulation, transfers, stairs, etc )    - Identify cognitive and physical deficits and behaviors that affect mobility  - Identify mobility aids required to assist with transfers and/or ambulation (gait belt, sit-to-stand, lift, walker, cane, etc )  - [place] fall precautions as indicated by assessment  - Record patient progress and toleration of activity level on Mobility SBAR; progress patient to next Phase/Stage  - Instruct patient to call for assistance with activity based on assessment  - Consider rehabilitation consult to assist with strengthening/weightbearing, etc   Outcome: Progressing     Problem: Knowledge Deficit  Goal: Patient/family/caregiver demonstrates understanding of disease process, treatment plan, medications, and discharge instructions  Description  Complete learning assessment and assess knowledge base    Interventions:  - Provide teaching at level of understanding  - Provide teaching via preferred learning methods  Outcome: Progressing     Problem: DISCHARGE PLANNING  Goal: Discharge to home or other facility with appropriate resources  Description  INTERVENTIONS:  - Identify barriers to discharge w/patient and caregiver  - Arrange for needed discharge resources and transportation as appropriate  - Identify discharge learning needs (meds, wound care, etc )  - Arrange for interpretive services to assist at discharge as needed  - Refer to Case Management Department for coordinating discharge planning if the patient needs post-hospital services based on physician/advanced practitioner order or complex needs related to functional status, cognitive ability, or social support system  Outcome: Progressing     Problem: POSTPARTUM  Goal: Experiences normal postpartum course  Description  INTERVENTIONS:  - Monitor maternal vital signs  - Assess uterine involution and lochia  Outcome: Progressing  Goal: Appropriate maternal -  bonding  Description  INTERVENTIONS:  - Identify family support  - Assess for appropriate maternal/infant bonding   -Encourage maternal/infant bonding opportunities  - Referral to  or  as needed  Outcome: Progressing  Goal: Establishment of infant feeding pattern  Description  INTERVENTIONS:  - Assess breast/bottle feeding  - Refer to lactation as needed  Outcome: Progressing  Goal: Incision(s), wounds(s) or drain site(s) healing without S/S of infection  Description  INTERVENTIONS  - Assess and document risk factors for skin impairment   - Assess and document dressing, incision, wound bed, drain sites and surrounding tissue  - Consider nutrition services referral as needed  - Oral mucous membranes remain intact  - Provide patient/ family education  Outcome: Progressing     Problem: ALTERATION IN THE BREAST  Goal: Optimize infant feeding at the breast  Description  INTERVENTIONS:  - Latch, breast and nipple assessment  - Assess prior breast feeding history  - Hand expression of breast milk  - For cracked, bleeding and or sore nipples reassess latch, treat damaged nipple  -Educate mother on feeding cues  -Positioning/latch techniques  Outcome: Progressing     Problem: INADEQUATE LATCH, SUCK OR SWALLOW  Goal: Demonstrate ability to latch and sustain latch, audible swallowing and satiety  Description  INTERVENTIONS:  - Assess oral anatomy, notify Physician/AP for abnormal findings  - Establish milk expression  - Maximize feeding opportunity (skin to skin, behavioral state)  - Position/latch techniques  - Discourage use of pacifier-artificial nipple  - Mechanical pumping  - Nipple Shield  - Supplemental formula feeding (Physician/AP order)  - Alternative feeding method  Outcome: Progressing

## 2020-01-04 NOTE — PLAN OF CARE
Problem: PAIN - ADULT  Goal: Verbalizes/displays adequate comfort level or baseline comfort level  Description  Interventions:  - Encourage patient to monitor pain and request assistance  - Assess pain using appropriate pain scale  - Administer analgesics based on type and severity of pain and evaluate response  - Implement non-pharmacological measures as appropriate and evaluate response  - Consider cultural and social influences on pain and pain management  - Notify physician/advanced practitioner if interventions unsuccessful or patient reports new pain  Outcome: Completed     Problem: INFECTION - ADULT  Goal: Absence or prevention of progression during hospitalization  Description  INTERVENTIONS:  - Assess and monitor for signs and symptoms of infection  - Monitor lab/diagnostic results  - Monitor all insertion sites, i e  indwelling lines, tubes, and drains  - Monitor endotracheal if appropriate and nasal secretions for changes in amount and color  - [place] appropriate cooling/warming therapies per order  - Administer medications as ordered  - Instruct and encourage patient and family to use good hand hygiene technique  - Identify and instruct in appropriate isolation precautions for identified infection/condition  Outcome: Completed  Goal: Absence of fever/infection during neutropenic period  Description  INTERVENTIONS:  - Monitor WBC    Outcome: Completed     Problem: SAFETY ADULT  Goal: Patient will remain free of falls  Description  INTERVENTIONS:  - Assess patient frequently for physical needs  -  Identify cognitive and physical deficits and behaviors that affect risk of falls    -  [place] fall precautions as indicated by assessment   - Educate patient/family on patient safety including physical limitations  - Instruct patient to call for assistance with activity based on assessment  - Modify environment to reduce risk of injury  - Consider OT/PT consult to assist with strengthening/mobility  Outcome: Completed  Goal: Maintain or return to baseline ADL function  Description  INTERVENTIONS:  -  Assess patient's ability to carry out ADLs; assess patient's baseline for ADL function and identify physical deficits which impact ability to perform ADLs (bathing, care of mouth/teeth, toileting, grooming, dressing, etc )  - Assess/evaluate cause of self-care deficits   - Assess range of motion  - Assess patient's mobility; develop plan if impaired  - Assess patient's need for assistive devices and provide as appropriate  - Encourage maximum independence but intervene and supervise when necessary  - Involve family in performance of ADLs  - Assess for home care needs following discharge   - Consider OT consult to assist with ADL evaluation and planning for discharge  - Provide patient education as appropriate  Outcome: Completed  Goal: Maintain or return mobility status to optimal level  Description  INTERVENTIONS:  - Assess patient's baseline mobility status (ambulation, transfers, stairs, etc )    - Identify cognitive and physical deficits and behaviors that affect mobility  - Identify mobility aids required to assist with transfers and/or ambulation (gait belt, sit-to-stand, lift, walker, cane, etc )  - [place] fall precautions as indicated by assessment  - Record patient progress and toleration of activity level on Mobility SBAR; progress patient to next Phase/Stage  - Instruct patient to call for assistance with activity based on assessment  - Consider rehabilitation consult to assist with strengthening/weightbearing, etc   Outcome: Completed     Problem: Knowledge Deficit  Goal: Patient/family/caregiver demonstrates understanding of disease process, treatment plan, medications, and discharge instructions  Description  Complete learning assessment and assess knowledge base    Interventions:  - Provide teaching at level of understanding  - Provide teaching via preferred learning methods  Outcome: Completed     Problem: DISCHARGE PLANNING  Goal: Discharge to home or other facility with appropriate resources  Description  INTERVENTIONS:  - Identify barriers to discharge w/patient and caregiver  - Arrange for needed discharge resources and transportation as appropriate  - Identify discharge learning needs (meds, wound care, etc )  - Arrange for interpretive services to assist at discharge as needed  - Refer to Case Management Department for coordinating discharge planning if the patient needs post-hospital services based on physician/advanced practitioner order or complex needs related to functional status, cognitive ability, or social support system  Outcome: Completed     Problem: POSTPARTUM  Goal: Experiences normal postpartum course  Description  INTERVENTIONS:  - Monitor maternal vital signs  - Assess uterine involution and lochia  Outcome: Completed  Goal: Appropriate maternal -  bonding  Description  INTERVENTIONS:  - Identify family support  - Assess for appropriate maternal/infant bonding   -Encourage maternal/infant bonding opportunities  - Referral to  or  as needed  Outcome: Completed  Goal: Establishment of infant feeding pattern  Description  INTERVENTIONS:  - Assess breast/bottle feeding  - Refer to lactation as needed  Outcome: Completed  Goal: Incision(s), wounds(s) or drain site(s) healing without S/S of infection  Description  INTERVENTIONS  - Assess and document risk factors for skin impairment   - Assess and document dressing, incision, wound bed, drain sites and surrounding tissue  - Consider nutrition services referral as needed  - Oral mucous membranes remain intact  - Provide patient/ family education  Outcome: Completed     Problem: ALTERATION IN THE BREAST  Goal: Optimize infant feeding at the breast  Description  INTERVENTIONS:  - Latch, breast and nipple assessment  - Assess prior breast feeding history  - Hand expression of breast milk  - For cracked, bleeding and or sore nipples reassess latch, treat damaged nipple  -Educate mother on feeding cues  -Positioning/latch techniques  Outcome: Completed     Problem: INADEQUATE LATCH, SUCK OR SWALLOW  Goal: Demonstrate ability to latch and sustain latch, audible swallowing and satiety  Description  INTERVENTIONS:  - Assess oral anatomy, notify Physician/AP for abnormal findings  - Establish milk expression  - Maximize feeding opportunity (skin to skin, behavioral state)  - Position/latch techniques  - Discourage use of pacifier-artificial nipple  - Mechanical pumping  - Nipple Shield  - Supplemental formula feeding (Physician/AP order)  - Alternative feeding method  Outcome: Completed

## 2020-01-04 NOTE — PROGRESS NOTES
Progress Note - OB/GYN   Bel Rodríguez 26 y o  female MRN: [id number]  Unit/Bed#: -01 Encounter: [id number]    Assessment:  Post partum Day #4 s/p RLTCS, stable, baby in nursery (needs bili lights)    Plan:  1) gHTN   -P/c ratio 0 09   -BP's overnight (144-158/77-95), asymptomatic    2) Continue routine post partum care   Encourage ambulation   Encourage breastfeeding   Anticipate discharge today     Subjective/Objective   Chief Complaint:     Post delivery  Patient is doing well  Lochia WNL  Pain well controlled  Subjective:     Pain: yes, cramping, improved with meds  Tolerating PO: yes  Voiding: yes  Flatus: yes  BM: yes  Ambulating: yes  Breastfeeding:  yes  Chest pain: no  Shortness of breath: no  Leg pain: no  Lochia: minimal    Objective:     Vitals: /77 (BP Location: Right arm)   Pulse 74   Temp 97 5 °F (36 4 °C) (Oral)   Resp 20   Ht 6' 1" (1 854 m)   Wt 135 kg (297 lb)   LMP 04/10/2019   SpO2 98%   Breastfeeding? Yes Comment: and supplementing  BMI 39 18 kg/m²     No intake or output data in the 24 hours ending 01/04/20 0151    Lab Results   Component Value Date    WBC 8 24 01/01/2020    HGB 9 6 (L) 01/01/2020    HCT 28 8 (L) 01/01/2020    MCV 87 01/01/2020     (L) 01/01/2020       Physical Exam:     Gen: AAOx3, NAD  CV: RRR  Lungs: CTA b/l  Abd: Soft, non-tender, non-distended, no rebound or guarding  Uterine fundus firm and non-tender, 1 cm below the umbilicus     Incision:C/D/I  Ext: Non tender    Gissell Abraham MD  1/4/2020  1:51 AM

## 2020-01-06 NOTE — UTILIZATION REVIEW
Discharge Summary - OB/GYN   Richa Rodríguez 26 y o  female MRN: [id number]  Unit/Bed#: -01 Encounter: [id number]      Admission Date: 2019     Discharge Date: 2020    Admitting Diagnosis:   1  Pregnancy at 37w6d  2  Gestational hypertension    Discharge Diagnosis:   Same, delivered      Procedures: repeat  section, low transverse incision    Attending: No att  providers found    Hospital Course:     Tang Duran is a 26 y o  Yonathan Brewer at 37w6d wks who was initially admitted for repeat  section, in addition patient with diagnostic of gestational hypertension, BP's between 130-140/70-80,  protein creatinine ratio 0 09, patient was asymtomatic  She delivered a viable male  on 19 at [phone]  Weight 7lbs 12 4oz via repeat  section, low transverse incision  Apgars were 8 (1 min) and 9 (5 min)   was transferred to  nursery  Patient tolerated the procedure well and was transferred to recovery in stable condition  Her post-operative course was uncomplicated  Preoperative hemaglobin was 12 4, postoperative was 9 6  Her postoperative pain was well controlled with oral analgesics  On day of discharge, she was ambulating and able to reasonably perform all ADLs  She was voiding and had appropriate bowel function  Pain was well controlled  She was discharged home on post-operative day #4 without complications  Patient was instructed to follow up with her OB as an outpatient and was given appropriate warnings to call provider if she develops signs of infection or uncontrolled pain  Complications: none apparent    Condition at discharge: good     Discharge instructions/Information to patient and family:   See after visit summary for information provided to patient and family  Provisions for Follow-Up Care:  See after visit summary for information related to follow-up care and any pertinent home health orders        Disposition: Home    Planned Readmission: No    Discharge Medications: For a complete list of the patient's medications, please refer to her med rec      Cele Pen

## 2020-01-07 ENCOUNTER — POSTPARTUM VISIT (OUTPATIENT)
Dept: OBGYN CLINIC | Facility: CLINIC | Age: 30
End: 2020-01-07

## 2020-01-07 VITALS
WEIGHT: 290 LBS | DIASTOLIC BLOOD PRESSURE: 84 MMHG | BODY MASS INDEX: 39.28 KG/M2 | SYSTOLIC BLOOD PRESSURE: 134 MMHG | HEIGHT: 72 IN

## 2020-01-07 DIAGNOSIS — Z98.891 S/P REPEAT LOW TRANSVERSE C-SECTION: Primary | ICD-10-CM

## 2020-01-07 PROCEDURE — 99024 POSTOP FOLLOW-UP VISIT: CPT | Performed by: PHYSICIAN ASSISTANT

## 2020-01-07 NOTE — PROGRESS NOTES
Postpartum Visit:   Patient here for postpartum visit  She is 1 weeks post partum following a low transverse  section  I have fully reviewed the prenatal and intrapartum course  The delivery was at 40 gestational weeks  Outcome: LTCS  Anesthesia: spinal   Postpartum course has been uncomplicated  Baby's course has been doing well without problems  Baby is feeding both breast and bottle- supplementing with formula, not producing much, did not produce much with daughter  Patient is tolerating regular diet, Bleeding thin lochia  Bowel function is normal  Bladder function is normal       Objective  Physical Exam   Constitutional: She is oriented to person, place, and time  She appears well-developed and well-nourished  Genitourinary: Vagina normal and uterus normal  There is no rash, tenderness, lesion, injury or Bartholin's cyst on the right labia  There is no rash, tenderness, lesion, injury or Bartholin's cyst on the left labia  Vagina exhibits no lesion  No erythema, tenderness or bleeding in the vagina  No signs of injury around the vagina  No vaginal discharge found  Right adnexum does not display mass, does not display tenderness and does not display fullness  Left adnexum does not display mass, does not display tenderness and does not display fullness  Cervix does not exhibit motion tenderness, lesion or discharge  Uterus is not enlarged, tender, exhibiting a mass, irregular (is regular) or mobile  HENT:   Head: Normocephalic and atraumatic  Neck: No thyromegaly present  Cardiovascular: Normal rate, regular rhythm and normal heart sounds  Exam reveals no gallop and no friction rub  No murmur heard  Pulmonary/Chest: Effort normal and breath sounds normal  No respiratory distress  She has no wheezes  Abdominal: Soft  She exhibits no distension and no mass  There is no tenderness  There is no rebound and no guarding  No hernia         Mild odor from incision, cleaned wound today with [place] Marker  Lymphadenopathy:     She has no cervical adenopathy  Right: No inguinal adenopathy present  Left: No inguinal adenopathy present  Neurological: She is alert and oriented to person, place, and time  Skin: Skin is warm and dry  Psychiatric: She has a normal mood and affect  Her behavior is normal            Assessment/Plan  S/P repeat low transverse   1 week s/p LTCS at 37 wks for gestational hypertension  BP normal today  Patient feeling well  Reviewed small mismatch repair at incision, reassured will heal fine on own, otherwise incision intact with no issues  Reviewed breastfeeding with patient, offered Baby and Me to see lactation consultant if desires  No driving until you feel as though you can slam foot on brake pedal without hesitation  Keep incision clean and dry  Return to office in 2 weeks for PP visit

## 2020-01-07 NOTE — ASSESSMENT & PLAN NOTE
1 week s/p LTCS at 37 wks for gestational hypertension  BP normal today  Patient feeling well  Reviewed small mismatch repair at incision, reassured will heal fine on own, otherwise incision intact with no issues  Reviewed breastfeeding with patient, offered Baby and Me to see lactation consultant if desires  No driving until you feel as though you can slam foot on brake pedal without hesitation  Keep incision clean and dry  Return to office in 2 weeks for PP visit

## 2020-01-08 LAB — PLACENTA IN STORAGE: NORMAL

## 2020-01-22 ENCOUNTER — POSTPARTUM VISIT (OUTPATIENT)
Dept: OBGYN CLINIC | Facility: CLINIC | Age: 30
End: 2020-01-22

## 2020-01-22 VITALS
BODY MASS INDEX: 39.42 KG/M2 | SYSTOLIC BLOOD PRESSURE: 120 MMHG | HEIGHT: 72 IN | DIASTOLIC BLOOD PRESSURE: 82 MMHG | WEIGHT: 291 LBS

## 2020-01-22 PROCEDURE — 99024 POSTOP FOLLOW-UP VISIT: CPT | Performed by: OBSTETRICS & GYNECOLOGY

## 2020-01-22 NOTE — PROGRESS NOTES
Postpartum Visit: Patient here for postpartum visit  She is 3 weeks post partum following a repeat  section  I have fully reviewed the prenatal and intrapartum course  The delivery was at 40 gestational weeks due to gestational hypertension  Outcome: RCS  Anesthesia: spinal   Postpartum course has been complicated by Unable to breast-feed milk dried up  Baby's course has been doing well without problems  Baby is feeding by bottle and sleeping 4-5 hours at a time  Patient is tolerating regular diet, Bleeding thin lochia  Bowel function is normal  Bladder function is normal  Patient is not sexually active  Contraception method is condoms  Postpartum depression screening: negative  EPDS 1    Physical:   Vitals:    20 1446   BP: 120/82       Objective     /82 (BP Location: Left arm, Patient Position: Sitting, Cuff Size: Standard)   Ht 6' (1 829 m)   Wt 132 kg (291 lb)   LMP 04/10/2019   Breastfeeding? No   BMI 39 47 kg/m²   General appearance: alert and oriented, in no acute distress  Lungs: clear to auscultation bilaterally  Heart: regular rate and rhythm, S1, S2 normal, no murmur, click, rub or gallop  Abdomen: soft, non-tender; bowel sounds normal; no masses,  no organomegaly and Incision clean dry intact well-healed  Pelvic: external genitalia normal, vagina normal without discharge, no cervical motion tenderness, no adnexal masses or tenderness, rectovaginal septum normal and Uterus is firm nontender but sub involuted still about 14-15 week size        Assessment 31year-old  3 weeks post repeat  section steadily recovering delivered at 37 weeks for gestational hypertension  Uterus sub involuted  Planning to use condoms or rhythm for contraception, will restart OCP if needed for dysmenorrhea or cysts  Plan:  Return in 2-3 weeks for repeat blood pressure check and for repeat check of uterus

## 2020-01-28 ENCOUNTER — TELEPHONE (OUTPATIENT)
Dept: OBGYN CLINIC | Facility: CLINIC | Age: 30
End: 2020-01-28

## 2020-01-28 NOTE — TELEPHONE ENCOUNTER
----- Message from Sunil Schaeffer MD sent at 1/22/2020  3:09 PM EST -----  Regarding: pp leave  Patient had to be delivered at 37 weeks for gestational htn  We want to add that and c/s to her medical leave so that she can qualify for 8 week of pp leave    She will be back for recheck in 2 -3 weeks for uterus and for bp check

## 2020-02-07 ENCOUNTER — TELEPHONE (OUTPATIENT)
Dept: OBGYN CLINIC | Facility: CLINIC | Age: 30
End: 2020-02-07

## 2020-02-10 ENCOUNTER — POSTPARTUM VISIT (OUTPATIENT)
Dept: OBGYN CLINIC | Facility: CLINIC | Age: 30
End: 2020-02-10

## 2020-02-10 VITALS
HEIGHT: 72 IN | DIASTOLIC BLOOD PRESSURE: 84 MMHG | WEIGHT: 292.4 LBS | BODY MASS INDEX: 39.6 KG/M2 | SYSTOLIC BLOOD PRESSURE: 126 MMHG

## 2020-02-10 PROCEDURE — 99024 POSTOP FOLLOW-UP VISIT: CPT | Performed by: OBSTETRICS & GYNECOLOGY

## 2020-02-13 NOTE — PROGRESS NOTES
Postpartum Visit: Patient here for repeat postpartum visit to evaluate blood pressure and for sub involution of uterus  She is 6 weeks post partum following a repeat  section  I have fully reviewed the prenatal and intrapartum course  The delivery was at 40 gestational weeks  Outcome: RCS  Anesthesia: spinal   Postpartum course has been uncomplicated  Baby's course has been doing well without problems  Baby is feeding bottle  Patient is tolerating regular diet, Bleeding no bleeding  Bowel function is normal  Bladder function is normal  Patient is not sexually active  Contraception method is condoms   Postpartum depression screening: negative  EPDS 0    Physical:   Vitals:    02/10/20 1612   BP: 126/84       Objective     /84 (BP Location: Left arm, Patient Position: Sitting, Cuff Size: Standard)   Ht 6' (1 829 m)   Wt 133 kg (292 lb 6 4 oz)   BMI 39 66 kg/m²   General appearance: alert and oriented, in no acute distress  Lungs: clear to auscultation bilaterally  Heart: regular rate and rhythm, S1, S2 normal, no murmur, click, rub or gallop  Abdomen: soft, non-tender; bowel sounds normal; no masses,  no organomegaly and Incision clean dry intact healing well  Pelvic: external genitalia normal, vagina normal without discharge, uterus normal size, shape, and consistency, no cervical motion tenderness and no adnexal masses or tenderness        Assessment 31year-old  6 weeks post repeat  steadily recovering    Plan:  Return for annual or sooner as needed

## 2020-02-18 ENCOUNTER — TELEPHONE (OUTPATIENT)
Dept: OBGYN CLINIC | Facility: CLINIC | Age: 30
End: 2020-02-18

## 2020-05-27 ENCOUNTER — TELEMEDICINE (OUTPATIENT)
Dept: BARIATRICS | Facility: CLINIC | Age: 30
End: 2020-05-27
Payer: COMMERCIAL

## 2020-05-27 VITALS — WEIGHT: 283.6 LBS | BODY MASS INDEX: 38.41 KG/M2 | HEIGHT: 72 IN

## 2020-05-27 DIAGNOSIS — E66.9 OBESITY: Primary | ICD-10-CM

## 2020-05-27 DIAGNOSIS — F32.A DEPRESSION: ICD-10-CM

## 2020-05-27 PROCEDURE — 99214 OFFICE O/P EST MOD 30 MIN: CPT | Performed by: FAMILY MEDICINE

## 2020-05-27 RX ORDER — VORTIOXETINE 5 MG/1
TABLET, FILM COATED ORAL
COMMUNITY
Start: 2020-05-11 | End: 2020-06-30

## 2020-06-17 ENCOUNTER — OFFICE VISIT (OUTPATIENT)
Dept: BARIATRICS | Facility: CLINIC | Age: 30
End: 2020-06-17

## 2020-06-17 VITALS — HEIGHT: 72 IN | WEIGHT: 293 LBS | BODY MASS INDEX: 39.68 KG/M2

## 2020-06-17 DIAGNOSIS — R63.5 ABNORMAL WEIGHT GAIN: ICD-10-CM

## 2020-06-17 PROCEDURE — DB3PK

## 2020-06-17 PROCEDURE — RECHECK

## 2020-06-18 ENCOUNTER — OFFICE VISIT (OUTPATIENT)
Dept: URGENT CARE | Facility: CLINIC | Age: 30
End: 2020-06-18
Payer: COMMERCIAL

## 2020-06-18 ENCOUNTER — APPOINTMENT (OUTPATIENT)
Dept: RADIOLOGY | Facility: CLINIC | Age: 30
End: 2020-06-18
Payer: COMMERCIAL

## 2020-06-18 VITALS
WEIGHT: 293 LBS | SYSTOLIC BLOOD PRESSURE: 156 MMHG | HEIGHT: 72 IN | RESPIRATION RATE: 18 BRPM | BODY MASS INDEX: 39.68 KG/M2 | HEART RATE: 74 BPM | OXYGEN SATURATION: 100 % | DIASTOLIC BLOOD PRESSURE: 80 MMHG | TEMPERATURE: 97.2 F

## 2020-06-18 DIAGNOSIS — S90.32XA CONTUSION OF LEFT FOOT, INITIAL ENCOUNTER: ICD-10-CM

## 2020-06-18 DIAGNOSIS — S99.922A INJURY OF LEFT FOOT, INITIAL ENCOUNTER: Primary | ICD-10-CM

## 2020-06-18 DIAGNOSIS — S99.922A INJURY OF LEFT FOOT, INITIAL ENCOUNTER: ICD-10-CM

## 2020-06-18 PROCEDURE — 73630 X-RAY EXAM OF FOOT: CPT

## 2020-06-18 PROCEDURE — G0382 LEV 3 HOSP TYPE B ED VISIT: HCPCS | Performed by: NURSE PRACTITIONER

## 2020-06-30 ENCOUNTER — ANNUAL EXAM (OUTPATIENT)
Dept: OBGYN CLINIC | Facility: CLINIC | Age: 30
End: 2020-06-30
Payer: COMMERCIAL

## 2020-06-30 VITALS
BODY MASS INDEX: 39.68 KG/M2 | TEMPERATURE: 97 F | HEIGHT: 72 IN | DIASTOLIC BLOOD PRESSURE: 70 MMHG | WEIGHT: 293 LBS | SYSTOLIC BLOOD PRESSURE: 122 MMHG

## 2020-06-30 DIAGNOSIS — Z01.419 ENCOUNTER FOR GYNECOLOGICAL EXAMINATION (GENERAL) (ROUTINE) WITHOUT ABNORMAL FINDINGS: Primary | ICD-10-CM

## 2020-06-30 PROBLEM — Z3A.37 37 WEEKS GESTATION OF PREGNANCY: Status: RESOLVED | Noted: 2019-12-19 | Resolved: 2020-06-30

## 2020-06-30 PROBLEM — Z98.891 HISTORY OF CESAREAN DELIVERY: Status: RESOLVED | Noted: 2019-09-27 | Resolved: 2020-06-30

## 2020-06-30 PROBLEM — Z98.891 S/P REPEAT LOW TRANSVERSE C-SECTION: Status: RESOLVED | Noted: 2020-01-03 | Resolved: 2020-06-30

## 2020-06-30 PROBLEM — O99.210 OBESITY AFFECTING PREGNANCY, ANTEPARTUM: Status: RESOLVED | Noted: 2019-07-10 | Resolved: 2020-06-30

## 2020-06-30 PROBLEM — O13.9 GESTATIONAL HYPERTENSION: Status: RESOLVED | Noted: 2019-12-31 | Resolved: 2020-06-30

## 2020-06-30 PROCEDURE — 99395 PREV VISIT EST AGE 18-39: CPT | Performed by: PHYSICIAN ASSISTANT

## 2020-07-01 LAB
CLINICAL INFO: NORMAL
CYTO CVX: NORMAL
DATE PREVIOUS BX: NORMAL
HPV E6+E7 MRNA CVX QL NAA+PROBE: NOT DETECTED
LMP START DATE: NORMAL
SL AMB PREV. PAP:: NORMAL
SPECIMEN SOURCE CVX/VAG CYTO: NORMAL

## 2020-08-10 ENCOUNTER — OFFICE VISIT (OUTPATIENT)
Dept: BARIATRICS | Facility: CLINIC | Age: 30
End: 2020-08-10

## 2020-08-10 VITALS — WEIGHT: 279.7 LBS | BODY MASS INDEX: 37.88 KG/M2 | HEIGHT: 72 IN | TEMPERATURE: 98.4 F

## 2020-08-10 DIAGNOSIS — R63.5 ABNORMAL WEIGHT GAIN: Primary | ICD-10-CM

## 2020-08-10 PROCEDURE — RECHECK

## 2020-08-10 NOTE — PROGRESS NOTES
Weight Management Medical Nutrition Assessment  Romayne Pesa is here for 2 of 3 bundle  Seen 7 wks ago  Current wt: 279 7 lbs  Loss of 16 3 lbs x 7 wks  Consuming 3081-1488 calories/day  Feels she has hit a plateau  Running 5x/wk (2 miles than walk 0 75 mile)  Likely not consuming enough to match activity  On exercise days recommend add fruit to breakfast, lunch and increase carb at dinner to 1 cup  This should bring calories to 9400-5508  I still think she will need additional calories and if no movement we will add another 200 calorie snack between breakfast and lunch  She will f/u in 2 months  Patient seen by Medical Provider in past 6 months:  yes  Requested to schedule appointment with Medical Provider: No    Anthropometric Measurements  Start Weight (#): 296 lbs  Current wt: 279 7 lbs   Ideal Body Weight (#): 160 lbs  Goal Weight (#): 180 lbs   lbs  Highest: 296 lbs  Lowest: 174 lbs 2014    Weight Loss History  Previous weight loss attempts: Exercise  Self Created Diets (Portion Control, Healthy Food Choices, etc )    Food and Nutrition Related History  Wake up: 4-4:30  Bed Time: 8-8:30    Food Recall  Breakfast: 5:00 meal replacement  Snack:skip  Lunch: 9:30-11:00 replacement  Snack: 3-4 banana, pb OR a few cheese sticks OR yogurt  Dinner: 5:30-6:00: >6 oz protein, 1/2-3/4 cup carb, 1 cup veggie    Snack: skip    Beverages: water and coffee/tea, unsweetened tea  Volume of beverage intake: gallon of water     Weekends: Same but might eat less  Cravings:  n/a  Trouble area of day: dinner    Frequency of Eating out: irregularly  Food restrictions: n/a  Cooking: self   Food Shopping: self    Physical Activity Intake  Activity:Walking and Running  Frequency:5x/wk  Physical limitations/barriers to exercise: n/a    Estimated Needs  Energy  Seca: [id number]     X 1 3 -1000 =1520  [place] Energy Needs:  BMR : 2096   1-2# loss weekly sedentary:  9567-2196          1-2# loss weekly lightly active: 7565-9794  Protein:    (1 2-1 5g/kg IBW)  Fluid: 85 oz     (35mL/kg IBW)    Nutrition Diagnosis  Yes; Overweight/obesity  related to Excess energy intake as evidenced by  BMI more than normative standard for age and sex (obesity-grade II 35-39  9)       Nutrition Intervention    Nutrition Prescription  Calories:3873-1215,  Protein:  gm    Meal Plan ([place]/Pro)  Breakfast: 300, 27  Snack: skip  Lunch: 300, 27  Snack: 200-250, 5-15  Dinner: 450-600, 28-42  Snack: skip    Nutrition Education:    Calorie controlled menu  Lean protein food choices  Healthy snack options  Food journaling tips    Nutrition Counseling:  Strategies: meal planning, portion sizes, healthy snack choices, hydration, fiber intake, protein intake, exercise, food journal    Monitoring and Evaluation:  Evaluation criteria:  Energy Intake  Meet protein needs  Maintain adequate hydration  Monitor weekly weight  Meal planning/preparation  Food journal   Decreased portions at mealtimes and snacks  Physical activity     Barriers to learning:none  Readiness to change: Action:  (Changing behavior)  Comprehension: very good  Expected Compliance: very good

## 2020-08-20 ENCOUNTER — TELEPHONE (OUTPATIENT)
Dept: OBGYN CLINIC | Facility: CLINIC | Age: 30
End: 2020-08-20

## 2020-08-20 NOTE — TELEPHONE ENCOUNTER
Mom states pt has a bump that is now a rash under her nipple on her right breast  Reviewed with pts mother to rule out bug bite to contact PCP  Mom is going to call PCP to get apt  Mom denies pt having any breast pain or lumps felt on exam  Advise mom to call back if PCP feels should be seen by GYN

## 2020-08-20 NOTE — TELEPHONE ENCOUNTER
Mom calling in for her daughter, would like to have rash on breast looked at by us  States that it has worsened since yesterday  Had previously called in and I had recommended she see her primary  Mom states that seems to have gotten larger from yesterday and from what she can tell looks like a bite  Mom lmom stating patient would prefer to come here

## 2021-03-04 NOTE — PROGRESS NOTES
Weight Management Medical Nutrition Assessment  Cindy Villalobos is here for 3 of 3 bundle  Last seen just under 7 months ago  Current wt: 250 lbs  Loss of 29 7 lbs x 5 months  Got Covid and lost some weight, but feels like she has hit a plateau  Currently consuming 0459-1042 calories  Joined [employer] and working out 5-6x/wk  Recommended that she increase snack at 2-3pm to more of a "mini meal", approximately 300 calories  Then decrease calories at dinner because she states that she is having 700-800 calories at that time (reduce carb from 1 cup to 1/2 cup, 8oz to [employer] of protein)  Provided with handout on mini meals  She will call to renew bundle or do another body comp  Patient seen by Medical Provider in past 6 months:  yes  Requested to schedule appointment with Medical Provider: No    Anthropometric Measurements  Start Weight (#): 296 lbs 6/17/20  Current wt: 250 lbs   Ideal Body Weight (#): 160 lbs  Goal Weight (#): 180 lbs   lbs  Highest: 296 lbs  Lowest: 174 lbs 2014    Weight Loss History  Previous weight loss attempts: Exercise  Self Created Diets (Portion Control, Healthy Food Choices, etc )    Food and Nutrition Related History  Wake up: 4-4:30  Bed Time: 8-8:30    Food Recall  Breakfast: 4:00am meal replacement  Snack: Coffee and fruit after workout  Lunch: 10-11:00am replacement  Snack: 2-3pm a few cheese sticks, apples   Dinner: 5:30-6:00: >8 oz protein, 1 cup carb, veggies    Snack: skip    Beverages: water and coffee/tea, unsweetened tea  Volume of beverage intake: gallon of water     Weekends: Same but might eat less  Cravings:  n/a  Trouble area of day: dinner    Frequency of Eating out: irregularly  Food restrictions: n/a  Cooking: self   Food Shopping: self    Physical Activity Intake  Activity:Walking and Running- recently joined [employer]  Frequency: 5-6x/wk  Physical limitations/barriers to exercise: n/a    Estimated Needs  Energy  [employer] Needs:  BMR : 1956   1-2# loss weekly sedentary:  1893-4126          1-2# loss weekly lightly active: 2462-1809  Protein:    (1 2-1 5g/kg IBW)  Fluid: 85 oz     (35mL/kg IBW)    Nutrition Diagnosis  Yes; Overweight/obesity  related to Excess energy intake as evidenced by  BMI more than normative standard for age and sex (obesity-grade I 26-30  9)       Nutrition Intervention    Nutrition Prescription  Calories:6117-4416,  Protein:  gm    Meal Plan (Devon/Pro)  Breakfast: 300, 27  Snack: skip  Lunch: 300, 27  Snack: 200-250, 5-15  Dinner: 450-600, 28-42  Snack: skip    Nutrition Education:    Calorie controlled menu  Lean protein food choices  Healthy snack options  Food journaling tips    Nutrition Counseling:  Strategies: meal planning, portion sizes, healthy snack choices, hydration, fiber intake, protein intake, exercise, food journal    Monitoring and Evaluation:  Evaluation criteria:  Energy Intake  Meet protein needs  Maintain adequate hydration  Monitor weekly weight  Meal planning/preparation  Food journal   Decreased portions at mealtimes and snacks  Physical activity     Barriers to learning:none  Readiness to change: Action:  (Changing behavior)  Comprehension: very good  Expected Compliance: very good

## 2021-03-05 ENCOUNTER — OFFICE VISIT (OUTPATIENT)
Dept: BARIATRICS | Facility: CLINIC | Age: 31
End: 2021-03-05

## 2021-03-05 VITALS — HEIGHT: 72 IN | WEIGHT: 250 LBS | BODY MASS INDEX: 33.86 KG/M2

## 2021-03-05 DIAGNOSIS — R63.5 ABNORMAL WEIGHT GAIN: Primary | ICD-10-CM

## 2021-03-05 PROCEDURE — RECHECK

## 2021-07-22 ENCOUNTER — ANNUAL EXAM (OUTPATIENT)
Dept: OBGYN CLINIC | Facility: CLINIC | Age: 31
End: 2021-07-22
Payer: COMMERCIAL

## 2021-07-22 VITALS
WEIGHT: 233 LBS | DIASTOLIC BLOOD PRESSURE: 84 MMHG | SYSTOLIC BLOOD PRESSURE: 124 MMHG | BODY MASS INDEX: 31.56 KG/M2 | HEIGHT: 72 IN

## 2021-07-22 DIAGNOSIS — N92.0 MENORRHAGIA WITH REGULAR CYCLE: ICD-10-CM

## 2021-07-22 DIAGNOSIS — N94.6 DYSMENORRHEA: ICD-10-CM

## 2021-07-22 DIAGNOSIS — Z01.419 GYNECOLOGIC EXAM NORMAL: Primary | ICD-10-CM

## 2021-07-22 PROCEDURE — 99395 PREV VISIT EST AGE 18-39: CPT | Performed by: PHYSICIAN ASSISTANT

## 2021-07-22 NOTE — ASSESSMENT & PLAN NOTE
Pap guidelines reviewed  Pap deferred secondary to negative pap and HPV in 2020 in this low risk patient  Reviewed menorrhagia and dysmenorrhea with patient  Will plan to get pelvic ultrasound and blood work to evaluate  Office will call with results and appropriate follow up  Return to office for annual or as needed

## 2021-07-22 NOTE — PROGRESS NOTES
Assessment/Plan   Problem List Items Addressed This Visit        Other    Gynecologic exam normal - Primary     Pap guidelines reviewed  Pap deferred secondary to negative pap and HPV in 2020 in this low risk patient  Reviewed menorrhagia and dysmenorrhea with patient  Will plan to get pelvic ultrasound and blood work to evaluate  Office will call with results and appropriate follow up  Return to office for annual or as needed  Other Visit Diagnoses     Menorrhagia with regular cycle        Relevant Orders    T4, free    TSH, 3rd generation    CBC and differential    US pelvis complete w transvaginal    Dysmenorrhea        Relevant Orders    US pelvis complete w transvaginal          Subjective:     Patient ID: Marta Torres is a 27 y o  y o  female  HPI  28 yo seen for annual exam Menses regular  Ever since last pregnancy menses are much heavier and getting large blood clots  Bleeding through tampon and pad in 1hr  Day 2-3 has terrible cramping  Denies bowel or bladder issues  Patient does desire future childbearing  Will likely plan next pregnancy in 1939[address]1630  Currently using condoms for birth control  Last pap: 2020 NILM (-)HRHPV  The following portions of the patient's history were reviewed and updated as appropriate:   She  has a past medical history of Abnormal Pap smear of cervix, Abnormal placenta, antepartum, Depression, HPV (human papilloma virus) infection, Maternal morbid obesity, antepartum ([facility] [address] ), and Varicella  She   Patient Active Problem List    Diagnosis Date Noted    Gynecologic exam normal 2021    Encounter for gynecological examination (general) (routine) without abnormal findings 2020    Depression 2019    Obesity 2019     She  has a past surgical history that includes  section (2015) and pr  delivery only (N/A, 2019)  Her family history includes Celiac disease in her sister;  Hypertension in her father; No Known Problems in her mother  She  reports that she has never smoked  She has never used smokeless tobacco  She reports current alcohol use  She reports that she does not use drugs  Current Outpatient Medications   Medication Sig Dispense Refill    Cholecalciferol (VITAMIN D) 2000 units CAPS Take by mouth      Prenat w/o Y-BJ-Vgjpbyr-FA-DHA (PNV-DHA) 27-0 6-0 4-300 MG CAPS Take by mouth      Zinc Acetate, Oral, (ZINC ACETATE PO) Take by mouth       No current facility-administered medications for this visit  She is allergic to doxycycline       Menstrual History:  OB History        2    Para   2    Term   2            AB        Living   2       SAB        TAB        Ectopic        Multiple   0    Live Births   2                  Patient's last menstrual period was 2021  Review of Systems   Constitutional: Negative for fatigue, fever and unexpected weight change  HENT: Negative for dental problem and sinus pressure  Eyes: Negative for visual disturbance  Respiratory: Negative for cough, shortness of breath and wheezing  Cardiovascular: Negative for chest pain  Gastrointestinal: Negative for abdominal pain, blood in stool, constipation, diarrhea, nausea and vomiting  Endocrine: Negative for polydipsia  Genitourinary: Negative for difficulty urinating, dyspareunia, dysuria, frequency, hematuria, pelvic pain and urgency  Musculoskeletal: Negative for arthralgias and back pain  Neurological: Negative for dizziness, seizures, light-headedness and headaches  Psychiatric/Behavioral: Negative for suicidal ideas  The patient is not nervous/anxious  Objective:  Vitals:    21 1721   BP: 124/84   BP Location: Left arm   Patient Position: Standing   Cuff Size: Large   Weight: 106 kg (233 lb)   Height: 6' (1 829 m)      Physical Exam  Constitutional:       Appearance: Normal appearance  She is well-developed     Genitourinary:      Vulva, urethra, bladder, vagina and uterus normal       No vulval condylomata, lesion, tenderness, ulcerations, Bartholin's cyst or rash noted  No signs of labial injury  No labial fusion  No inguinal adenopathy present in the right or left side  No urethral prolapse, pain, swelling, tenderness, caruncle, mass or diverticulum present  Bladder is not distended or tender  No signs of injury or lesions in the vagina  No vaginal discharge, erythema, tenderness or bleeding  No cervical motion tenderness, discharge or lesion  Uterus is not enlarged, tender, irregular or mobile  No uterine mass detected  No right or left adnexal mass present  Right adnexa not tender or full  Left adnexa not tender or full  HENT:      Head: Normocephalic and atraumatic  Neck:      Thyroid: No thyromegaly  Cardiovascular:      Rate and Rhythm: Normal rate and regular rhythm  Heart sounds: Normal heart sounds  No murmur heard  No friction rub  No gallop  Pulmonary:      Effort: Pulmonary effort is normal  No respiratory distress  Breath sounds: Normal breath sounds  No wheezing  Chest:      Breasts: Breasts are symmetrical          Right: Normal  No swelling, bleeding, inverted nipple, mass, nipple discharge, skin change or tenderness  Left: Normal  No swelling, bleeding, inverted nipple, mass, nipple discharge, skin change or tenderness  Abdominal:      General: There is no distension  Palpations: Abdomen is soft  There is no mass  Tenderness: There is no abdominal tenderness  There is no guarding or rebound  Hernia: No hernia is present  Lymphadenopathy:      Cervical: No cervical adenopathy  Upper Body:      Right upper body: No supraclavicular, axillary or pectoral adenopathy  Left upper body: No supraclavicular, axillary or pectoral adenopathy  Lower Body: No right inguinal adenopathy  No left inguinal adenopathy     Neurological: Mental Status: She is alert and oriented to person, place, and time  Skin:     General: Skin is warm and dry     Psychiatric:         Behavior: Behavior normal

## 2021-11-08 ENCOUNTER — APPOINTMENT (OUTPATIENT)
Dept: LAB | Facility: CLINIC | Age: 31
End: 2021-11-08
Payer: COMMERCIAL

## 2021-11-08 DIAGNOSIS — N92.0 MENORRHAGIA WITH REGULAR CYCLE: ICD-10-CM

## 2021-11-08 DIAGNOSIS — O02.81 CHEMICAL PREGNANCY: ICD-10-CM

## 2021-11-08 LAB
B-HCG SERPL-ACNC: <2 MIU/ML
BASOPHILS # BLD AUTO: 0.05 THOUSANDS/ΜL (ref 0–0.1)
BASOPHILS NFR BLD AUTO: 1 % (ref 0–1)
EOSINOPHIL # BLD AUTO: 0.15 THOUSAND/ΜL (ref 0–0.61)
EOSINOPHIL NFR BLD AUTO: 2 % (ref 0–6)
ERYTHROCYTE [DISTWIDTH] IN BLOOD BY AUTOMATED COUNT: 12.4 % (ref 11.6–15.1)
HCT VFR BLD AUTO: 39.8 % (ref 34.8–46.1)
HGB BLD-MCNC: 13.4 G/DL (ref 11.5–15.4)
LYMPHOCYTES # BLD AUTO: 2.61 THOUSANDS/ΜL (ref 0.6–4.47)
LYMPHOCYTES NFR BLD AUTO: 36 % (ref 14–44)
MCH RBC QN AUTO: 29.6 PG (ref 26.8–34.3)
MCHC RBC AUTO-ENTMCNC: 33.7 G/DL (ref 31.4–37.4)
MCV RBC AUTO: 88 FL (ref 82–98)
MONOCYTES # BLD AUTO: 0.49 THOUSAND/ΜL (ref 0.17–1.22)
MONOCYTES NFR BLD AUTO: 7 % (ref 4–12)
NEUTROPHILS # BLD AUTO: 3.89 THOUSANDS/ΜL (ref 1.85–7.62)
NEUTS SEG NFR BLD AUTO: 54 % (ref 43–75)
NRBC BLD AUTO-RTO: 0 /100 WBCS
PLATELET # BLD AUTO: 308 THOUSANDS/UL (ref 149–390)
PMV BLD AUTO: 11.7 FL (ref 8.9–12.7)
RBC # BLD AUTO: 4.52 MILLION/UL (ref 3.81–5.12)
T4 FREE SERPL-MCNC: 1.06 NG/DL (ref 0.76–1.46)
TSH SERPL DL<=0.05 MIU/L-ACNC: 1.47 UIU/ML (ref 0.36–3.74)
WBC # BLD AUTO: 7.32 THOUSAND/UL (ref 4.31–10.16)

## 2021-11-08 PROCEDURE — 84702 CHORIONIC GONADOTROPIN TEST: CPT

## 2021-11-08 PROCEDURE — 85025 COMPLETE CBC W/AUTO DIFF WBC: CPT

## 2021-11-08 PROCEDURE — 84439 ASSAY OF FREE THYROXINE: CPT

## 2021-11-08 PROCEDURE — 84443 ASSAY THYROID STIM HORMONE: CPT

## 2021-11-08 PROCEDURE — 36415 COLL VENOUS BLD VENIPUNCTURE: CPT

## 2021-11-09 ENCOUNTER — TELEPHONE (OUTPATIENT)
Dept: OBGYN CLINIC | Facility: CLINIC | Age: 31
End: 2021-11-09

## 2021-11-09 DIAGNOSIS — N83.202 CYSTS OF BOTH OVARIES: Primary | ICD-10-CM

## 2021-11-09 DIAGNOSIS — N83.201 CYSTS OF BOTH OVARIES: Primary | ICD-10-CM

## 2021-11-09 RX ORDER — ETONOGESTREL AND ETHINYL ESTRADIOL 11.7; 2.7 MG/1; MG/1
INSERT, EXTENDED RELEASE VAGINAL
Qty: 3 EACH | Refills: 1 | Status: SHIPPED | OUTPATIENT
Start: 2021-11-09

## 2022-10-12 PROBLEM — Z01.419 GYNECOLOGIC EXAM NORMAL: Status: RESOLVED | Noted: 2021-07-22 | Resolved: 2022-10-12

## 2023-02-09 ENCOUNTER — TELEPHONE (OUTPATIENT)
Dept: OBGYN CLINIC | Facility: CLINIC | Age: 33
End: 2023-02-09

## 2023-03-14 ENCOUNTER — ANNUAL EXAM (OUTPATIENT)
Dept: OBGYN CLINIC | Facility: CLINIC | Age: 33
End: 2023-03-14

## 2023-03-14 VITALS — DIASTOLIC BLOOD PRESSURE: 76 MMHG | BODY MASS INDEX: 32.41 KG/M2 | SYSTOLIC BLOOD PRESSURE: 122 MMHG | WEIGHT: 239 LBS

## 2023-03-14 DIAGNOSIS — Z30.09 BIRTH CONTROL COUNSELING: ICD-10-CM

## 2023-03-14 DIAGNOSIS — Z01.419 ENCOUNTER FOR GYNECOLOGICAL EXAMINATION (GENERAL) (ROUTINE) WITHOUT ABNORMAL FINDINGS: Primary | ICD-10-CM

## 2023-03-14 NOTE — PROGRESS NOTES
Assessment/Plan   Diagnoses and all orders for this visit:    Encounter for gynecological examination (general) (routine) without abnormal findings  -     Thinprep Tis Pap Reflex HPV mRNA E6/E7  The current ASCCP guidelines were reviewed  Patient's last pap was 6/30/20 - WNL (-) HRHPV type 16/18 neg and therefore, a pap with HPV cotesting is not indicated at this time  I emphasized the importance of an annual pelvic and breast exam  Patient opts to have a pap done today  Birth control counseling  Contraception - condoms  Discussed Nexplanon as a birth control option in detail  Placed for 3 years  Reviewed the most common side effects of dysfunctional uterine bleeding for the first few months after insertion, headaches, breast tenderness, and mood fluctuations  Most women's cycles will regulate to one period each month  Some women will experience amenorrhea and some women will experience a heavier, more crampy period  Reviewed insertion and removal process in the office  Small risk of infection after the procedure, can't lift same arm for 24 hours, fertility should return after 1 month of removal  Small risk of migrating or cannot located Nexplanon upon removal which would require further surgical procedure  Patient aware will be contacted by office in regards to coverage, ordering, and scheduling  Discussion  I have discussed the importance of monthly self-breast exams, exercise and healthy diet as well as adequate intake of calcium and vitamin D  Encourage MVI q day and r/eldon importance of folic acid; Encourage 30-40 min weight bearing exercise most days of week  Encourage safe sexual practices; STI testing - declines  Breast cancer screening is not indicated at this time  The patient has had the Gardasil vaccine series, which is recommended for patients from 545 years of age    All questions have been answered to her satisfaction  RTO for APE or sooner if needed    Subjective     HPI   Kenneth Aguirre is a 28 y o  female who presents for annual well woman exam    Menarche - 11; LMP - 2/21/23; Periods are reg q month and last 7 days; Heavy flow with clots days 2-5 - goes through a super tampon and pad q 3 hours at the heaviest; No intermenstrual bleeding or spotting; Cramps are tolerable  Had BW done to evaluate heavy flow 11/8/21 - CBC and thyroid WNL; Also experienced two ovarian cysts burst - one on each ovary around 11/2021 - hasn't experienced an ovarian cyst rupture since; No vulvar itch/burn; No vaginal itch/burn; No abn discharge or odor; No urinary sx - burning/pain/frequency/hematuria  (+) SBEs - no breast masses, asymmetry, nipple discharge or bleeding, changes in skin of breast, or breast tenderness bilaterally  No abd/pelvic pain or HAs;   Pt is sexually active in a mutually monog/ sexual relationship; No issues with intercourse; She declines sti/hiv/hep testing; Feels safe at home  Current contraception: condoms; desires to try Nexplanon; has been on OCP in the past - doesn't seem to lighten flow; then, tried Nuva ring - also didn't seem to lighten flow and also felt like it was uncomfortable; Not interested in an IUD at this time;  Gardasil - completed  (+) PCP for routine Bw/care; Last Pap - 6/30/20 - WNL (-) HRHPV type 16/18 neg  History of abnormal Pap smear: yes    Review of Systems   Constitutional: Negative for activity change, fatigue, fever and unexpected weight change  HENT: Negative for congestion, dental problem, sinus pressure and sinus pain  Eyes: Negative for visual disturbance  Respiratory: Negative for cough, shortness of breath and wheezing  Cardiovascular: Negative for chest pain and leg swelling  Gastrointestinal: Negative for abdominal distention, abdominal pain, blood in stool, constipation, diarrhea, nausea and vomiting  Endocrine: Negative for polydipsia     Genitourinary: Negative for difficulty urinating, dyspareunia, dysuria, frequency, hematuria, menstrual problem, pelvic pain, urgency, vaginal bleeding, vaginal discharge and vaginal pain  Musculoskeletal: Negative for arthralgias and back pain  Allergic/Immunologic: Negative for environmental allergies  Neurological: Negative for dizziness, seizures and headaches  Psychiatric/Behavioral: Negative for dysphoric mood and sleep disturbance  The patient is not nervous/anxious          The following portions of the patient's history were reviewed and updated as appropriate: allergies, current medications, past family history, past medical history, past social history, past surgical history and problem list          OB History        2    Para   2    Term   2            AB        Living   2       SAB        IAB        Ectopic        Multiple   0    Live Births   2           Obstetric Comments   : 2 C/S             Past Medical History:   Diagnosis Date   • Abnormal Pap smear of cervix    • Abnormal placenta, antepartum     resolved 16   • Depression     post partum   • HPV (human papilloma virus) infection    • Maternal morbid obesity, antepartum ([facility] [address] )     resolved 12 obeity complicating pregnancy, child birth or puerperium, antepartum   • Varicella     varicella vaccine       Past Surgical History:   Procedure Laterality Date   •  SECTION  2015   • CO  DELIVERY ONLY N/A 2019    Procedure:  SECTION () REPEAT;  Surgeon: Mary Mcmanus MD;  Location: [facility];  Service: Obstetrics       Family History   Problem Relation Age of Onset   • No Known Problems Mother    • Hypertension Father    • Celiac disease Sister        Social History     Socioeconomic History   • Marital status: /Civil Union     Spouse name: Not on file   • Number of children: Not on file   • Years of education: Not on file   • Highest education level: Not on file   Occupational History   • Not on file   Tobacco Use   • Smoking status: Never   • Smokeless tobacco: Never Vaping Use   • Vaping Use: Never used   Substance and Sexual Activity   • Alcohol use: Yes     Comment: social   • Drug use: No   • Sexual activity: Yes     Partners: Male     Birth control/protection: Condom Male   Other Topics Concern   • Not on file   Social History Narrative    Feels safe at home     Social Determinants of Health     Financial Resource Strain: Not on file   Food Insecurity: Not on file   Transportation Needs: Not on file   Physical Activity: Not on file   Stress: Not on file   Social Connections: Not on file   Intimate Partner Violence: Not on file   Housing Stability: Not on file         Current Outpatient Medications:   •  Multiple Vitamin (MULTIVITAMINS PO), Take by mouth, Disp: , Rfl:   •  Turmeric (QC TUMERIC COMPLEX PO), Take by mouth, Disp: , Rfl:     Allergies   Allergen Reactions   • Doxycycline Hives       Objective   Vitals:    03/14/23 1139   BP: 122/76   BP Location: Left arm   Patient Position: Sitting   Cuff Size: Large   Weight: 108 kg (239 lb)     Physical Exam  Vitals reviewed  Constitutional:       General: She is awake  She is not in acute distress  Appearance: Normal appearance  She is well-developed and well-groomed  She is not ill-appearing, toxic-appearing or diaphoretic  HENT:      Head: Normocephalic and atraumatic  Eyes:      Conjunctiva/sclera: Conjunctivae normal    Neck:      Thyroid: No thyroid mass, thyromegaly or thyroid tenderness  Cardiovascular:      Rate and Rhythm: Normal rate and regular rhythm  Heart sounds: Normal heart sounds  No murmur heard  Pulmonary:      Effort: Pulmonary effort is normal  No tachypnea, bradypnea or respiratory distress  Breath sounds: Normal breath sounds  No stridor or decreased air movement  No wheezing  Chest:   Breasts:     Breasts are symmetrical       Right: Normal  No swelling, bleeding, inverted nipple, mass, nipple discharge, skin change or tenderness        Left: Normal  No swelling, bleeding, inverted nipple, mass, nipple discharge, skin change or tenderness  Abdominal:      General: There is no distension  Palpations: Abdomen is soft  There is no hepatomegaly, splenomegaly or mass  Tenderness: There is no abdominal tenderness  Hernia: No hernia is present  There is no hernia in the left inguinal area or right inguinal area  Genitourinary:     General: Normal vulva  Exam position: Supine  Pubic Area: No rash or pubic lice  Labia:         Right: No rash, tenderness, lesion or injury  Left: No rash, tenderness, lesion or injury  Urethra: No prolapse, urethral pain, urethral swelling or urethral lesion  Vagina: Normal  No signs of injury and foreign body  No vaginal discharge, erythema, tenderness, bleeding, lesions or prolapsed vaginal walls  Cervix: No cervical motion tenderness, discharge, friability, lesion, erythema or cervical bleeding  Uterus: Not deviated, not enlarged, not fixed, not tender and no uterine prolapse  Adnexa:         Right: No mass, tenderness or fullness  Left: No mass, tenderness or fullness  Lymphadenopathy:      Cervical: No cervical adenopathy  Upper Body:      Right upper body: No supraclavicular or axillary adenopathy  Left upper body: No supraclavicular or axillary adenopathy  Lower Body: No right inguinal adenopathy  No left inguinal adenopathy  Skin:     General: Skin is warm and dry  Neurological:      Mental Status: She is alert and oriented to person, place, and time  Psychiatric:         Mood and Affect: Mood and affect normal          Speech: Speech normal          Behavior: Behavior normal  Behavior is cooperative  Thought Content:  Thought content normal          Judgment: Judgment normal

## 2023-03-17 LAB
CLINICAL INFO: NORMAL
CYTO CVX: NORMAL
CYTOLOGY CMNT CVX/VAG CYTO-IMP: NORMAL
DATE PREVIOUS BX: NORMAL
LMP START DATE: NORMAL
SL AMB PREV. PAP:: NORMAL
SPECIMEN SOURCE CVX/VAG CYTO: NORMAL

## 2023-08-21 ENCOUNTER — PROCEDURE VISIT (OUTPATIENT)
Dept: OBGYN CLINIC | Facility: CLINIC | Age: 33
End: 2023-08-21
Payer: COMMERCIAL

## 2023-08-21 VITALS — WEIGHT: 240 LBS | BODY MASS INDEX: 32.55 KG/M2 | SYSTOLIC BLOOD PRESSURE: 120 MMHG | DIASTOLIC BLOOD PRESSURE: 78 MMHG

## 2023-08-21 DIAGNOSIS — Z30.017 ENCOUNTER FOR INSERTION SUBDERMAL CONTRACEPTIVE: Primary | ICD-10-CM

## 2023-08-21 LAB — SL AMB POCT URINE HCG: NEGATIVE

## 2023-08-21 PROCEDURE — 11981 INSERTION DRUG DLVR IMPLANT: CPT | Performed by: PHYSICIAN ASSISTANT

## 2023-08-21 PROCEDURE — 81025 URINE PREGNANCY TEST: CPT | Performed by: PHYSICIAN ASSISTANT

## 2023-08-21 NOTE — PROGRESS NOTES
Universal Protocol:  Consent: Verbal consent obtained. Risks and benefits: risks, benefits and alternatives were discussed  Consent given by: patient  Time out: Immediately prior to procedure a "time out" was called to verify the correct patient, procedure, equipment, support staff and site/side marked as required. Patient understanding: patient states understanding of the procedure being performed  Patient consent: the patient's understanding of the procedure matches consent given  Site marked: the operative site was marked  Required items: required blood products, implants, devices, and special equipment available  Patient identity confirmed: verbally with patient    Remove and insert drug implant    Date/Time: 8/21/2023 12:30 PM    Performed by: Starr Toro PA-C  Authorized by: Starr Toro PA-C    Indication:     Indication: Insertion of non-biodegradable drug delivery implant    Pre-procedure:     Pre-procedure timeout performed: yes      Prepped with: povidone-iodine      Local anesthetic:  Lidocaine 1%    The site was cleaned and prepped in a sterile fashion: yes    Procedure:     Procedure: Insertion    Small stab incision was made in arm: no      Left/right:  Left    Preloaded contraceptive capsule trocar was placed subdermally: yes      Visualization of implant was obtained: yes      Contraceptive capsule was inserted and trocar removed: yes      Visualization of notch in stylet and palpation of device: yes      Palpation confirms placement by provider and patient: yes      Site was closed with steri-strips and pressure bandage applied: yes    Comments:      Pt presents for Nexplanon insertion for [facility] today. Pt aware of all risks and benefits. Aware of side effects and BC coverage. Aware of need for condoms for STD protection. Aware of possible paresthesias. Aware of migration risks. Aware of risk of damage to nerves/vessels. Pt tolerated procedure well without complaints.    Incision closed with steri strips, wrapped with bandage. Pt aware to keep dry and covered x 24 hours. Aware of s/sx of infection to call with. All questions answered.

## 2023-11-06 ENCOUNTER — TELEPHONE (OUTPATIENT)
Dept: OBGYN CLINIC | Facility: CLINIC | Age: 33
End: 2023-11-06

## 2023-11-06 NOTE — TELEPHONE ENCOUNTER
Verified in chart, Tdap 11/15/2019. Called and left detailed message on machine (verified okay on communication consent) with tdap administration date, and with the recommendations for family members, caregivers and others in close contact with baby to receive tdap booster if not received prior in the past 5-10 years. If any questions, concerns or wishes to further review to call office.

## 2023-11-06 NOTE — TELEPHONE ENCOUNTER
Patient left message on machine - family member is pregnant, due in December.  Advised patient to receive TDAP prior to baby's arrival, wanted to verify when had received TDAP with previous pregnancy and if covered

## 2023-12-01 ENCOUNTER — TELEPHONE (OUTPATIENT)
Dept: OBGYN CLINIC | Facility: CLINIC | Age: 33
End: 2023-12-01

## 2023-12-01 DIAGNOSIS — N92.6 IRREGULAR BLEEDING: Primary | ICD-10-CM

## 2023-12-01 NOTE — TELEPHONE ENCOUNTER
Patient left message on machine - nexplanon inserted in August. Since August has had 3 days. Over last 10 days has not only needed pad but also tampon. Wondering when should regulate. Not sure body is reacting to it right. Still having very painful cramps almost every day. Wondering what should be looking for or if should be removed.

## 2023-12-01 NOTE — TELEPHONE ENCOUNTER
Placed call to patient, nexplanon implanted in August 21, 2023, 3-4 days without bleeding, otherwise she needs to use pads/tampons due to light to moderate bleeding. Cramping, mild, intermittently. Not bleeding through a tampon/pad every hours, changing every few hours. Denies abdominal pain. Denies fever/chills. Does pass clots the size of a dime. Blood is dark to light red, changing throughout the day. Spoke to Dr. Srikanth Vail, order pelvic ultrasound and schedule appt to discuss either trouble shooting Nexplanon bleeding or removal.    Order placed. Patient scheduled on 12/15/23.

## 2023-12-06 ENCOUNTER — HOSPITAL ENCOUNTER (OUTPATIENT)
Dept: ULTRASOUND IMAGING | Facility: HOSPITAL | Age: 33
Discharge: HOME | End: 2023-12-06
Attending: OBSTETRICS & GYNECOLOGY
Payer: COMMERCIAL

## 2023-12-06 DIAGNOSIS — N92.6 IRREGULAR BLEEDING: ICD-10-CM

## 2023-12-06 PROCEDURE — 76830 TRANSVAGINAL US NON-OB: CPT

## 2023-12-06 PROCEDURE — 76856 US EXAM PELVIC COMPLETE: CPT

## 2023-12-11 NOTE — RESULT ENCOUNTER NOTE
Normal pelvic US - EMS 2mm. Possible adenomyosis. R 1.3cm cyst. Appt scheduled 12/15/23 for Nexplanon bleeding. Missy Braun.  Ad Talbot MD  OB/GYN  12/11/2023  12:54 PM

## 2023-12-15 ENCOUNTER — OFFICE VISIT (OUTPATIENT)
Dept: OBGYN CLINIC | Facility: CLINIC | Age: 33
End: 2023-12-15

## 2023-12-15 VITALS
WEIGHT: 240 LBS | DIASTOLIC BLOOD PRESSURE: 80 MMHG | HEIGHT: 72 IN | SYSTOLIC BLOOD PRESSURE: 116 MMHG | BODY MASS INDEX: 32.51 KG/M2

## 2023-12-15 DIAGNOSIS — N92.6 IRREGULAR BLEEDING: Primary | ICD-10-CM

## 2023-12-15 DIAGNOSIS — Z30.46 ENCOUNTER FOR REMOVAL OF SUBDERMAL CONTRACEPTIVE IMPLANT: ICD-10-CM

## 2023-12-15 RX ORDER — AMOXICILLIN AND CLAVULANATE POTASSIUM 875; 125 MG/1; MG/1
TABLET, FILM COATED ORAL
COMMUNITY
Start: 2023-12-05

## 2023-12-15 NOTE — PROGRESS NOTES
Assessment/Plan:    No problem-specific Assessment & Plan notes found for this encounter.         Problem List Items Addressed This Visit    None  Visit Diagnoses       Irregular bleeding    -  Primary    Encounter for removal of subdermal contraceptive implant                At this point we will plan Nexplanon removal then give body a few months off of BC to see how she does.   She will then plan APE in March to reasses menses off of BC and if she desires additional BC.   We did review recent US and adenomyosis findings.   Subjective:      Patient ID: Bertha Rodríguez is a 33 y.o. female.    Pt presents with complaints of irregualr bleedign since Nepxlanon placed . Bleeding almost daily-just stopped a few days ago.   She notes increased pelvic bloating and fullness as well as increased emotional sx.   Pt unsure what she would like to do for BC. They do not plan more childrne.   She does have a h/o ovarian cysts that have been her biggest barrier to being off of BC previously.   She notes that she did pills years ago but stopped due to emotional sx.   She is nervous about the IUD due to stories she has heard.   We reviewed Depo Provera but worried about weight gain.   At this point we will plan Nexplanon removal then give body a few months off of BC to see how she does.   She will then plan APE in March to reasses menses off of BC and if she desires additional BC.   We did review recent US and adenomyosis findings.         The following portions of the patient's history were reviewed and updated as appropriate: She  has a past medical history of Abnormal Pap smear of cervix, Abnormal placenta, antepartum, Depression, HPV (human papilloma virus) infection, Maternal morbid obesity, antepartum (HCC), and Varicella.  She   Patient Active Problem List    Diagnosis Date Noted    Depression 2019    Obesity 2019     She  has a past surgical history that includes  section (2015) and pr   delivery only (N/A, 12/31/2019).  Her family history includes Celiac disease in her sister; Hypertension in her father; No Known Problems in her mother.  She  reports that she has never smoked. She has never used smokeless tobacco. She reports current alcohol use. She reports that she does not use drugs.  Current Outpatient Medications   Medication Sig Dispense Refill    amoxicillin-clavulanate (AUGMENTIN) 875-125 mg per tablet       Multiple Vitamin (MULTIVITAMINS PO) Take by mouth      Turmeric (QC TUMERIC COMPLEX PO) Take by mouth       No current facility-administered medications for this visit.     Current Outpatient Medications on File Prior to Visit   Medication Sig    amoxicillin-clavulanate (AUGMENTIN) 875-125 mg per tablet     Multiple Vitamin (MULTIVITAMINS PO) Take by mouth    Turmeric (QC TUMERIC COMPLEX PO) Take by mouth     No current facility-administered medications on file prior to visit.     She is allergic to doxycycline..    Review of Systems   Genitourinary:  Positive for menstrual problem and pelvic pain.   Psychiatric/Behavioral:  Positive for dysphoric mood.          Objective:      /80 (BP Location: Left arm, Patient Position: Sitting, Cuff Size: Standard)   Ht 6' (1.829 m)   Wt 109 kg (240 lb)   LMP  (LMP Unknown) Comment: has had period since nexplanon insertion in aug  BMI 32.55 kg/m²          Physical Exam  HENT:      Head: Normocephalic and atraumatic.   Skin:     General: Skin is warm and dry.   Neurological:      Mental Status: She is alert.   Psychiatric:         Mood and Affect: Mood normal.         Behavior: Behavior normal.         Thought Content: Thought content normal.         Judgment: Judgment normal.             Universal Protocol:  Consent: Verbal consent obtained.  Risks and benefits: risks, benefits and alternatives were discussed  Consent given by: patient  Patient understanding: patient states understanding of the procedure being performed  Patient consent:  the patient's understanding of the procedure matches consent given  Site marked: the operative site was marked  Radiology Images displayed and confirmed. If images not available, report reviewed: imaging studies available  Required items: required blood products, implants, devices, and special equipment available  Patient identity confirmed: verbally with patient  Remove and insert drug implant    Date/Time: 12/15/2023 11:00 AM    Performed by: Shahrzad Townsend PA-C  Authorized by: Shahrzad Townsend PA-C    Indication:     Indication: Presence of non-biodegradable drug delivery implant    Pre-procedure:     Prepped with: povidone-iodine      Local anesthetic:  Lidocaine 1%    The site was cleaned and prepped in a sterile fashion: yes    Procedure:     Procedure:  Removal    Small stab incision was made in arm: yes      Left/right:  Left    Visualization of implant was obtained: yes      Site was closed with steri-strips and pressure bandage applied: yes    Comments:      Nexplanon palpated in the left arm.   Skin was prepped in a sterile fashion.   Area was numbed and small stab incision made.   Nexplanon was easily removed without incidence. Pt tolerated procedure well.   Incision closed with steri strips, wrapped with bandage. Pt aware to keep dry and covered x 24 hours.   Aware of s/sx of infection to call with.   All questions answered.

## 2024-04-01 ENCOUNTER — TELEPHONE (OUTPATIENT)
Dept: OBGYN CLINIC | Facility: CLINIC | Age: 34
End: 2024-04-01

## 2024-07-08 NOTE — PROGRESS NOTES
Assessment & Plan   Problem List Items Addressed This Visit    None  Visit Diagnoses     Well woman exam    -  Primary            Discussion    All questions have been answered to her satisfaction  RTO for APE or sooner if needed      Subjective     HPI   Bertha Rodríguez is a 34 y.o. female who presents for annual well woman exam.     LMP - *** ; Periods are reg q  days and last  days; No excessive bleeding; No intermenstrual bleeding or spotting; Cramps are tolerable.  No vulvar itch/burn; No vaginal itch/burn; No abn discharge or odor; No urinary sx - burning/pain/frequency/hematuria    No concerning breast masses, asymmetry, nipple discharge or bleeding, changes in skin of breast, or breast tenderness bilaterally    No abd/pelvic pain or HAs;     No menopausal symptoms.    Pt *** sexually active in a mutually monog/ sexual relationship; No issues with intercourse; She declines sti/hiv/hep testing; Feels safe at home  Current contraception: ***    (+) PCP for routine Bw/care;    Last Pap : 3/13/23 WNL   History of abnormal Pap smear: ***    Review of Systems   Constitutional: Negative.    Respiratory: Negative.     Gastrointestinal: Negative.    Endocrine: Negative.    Genitourinary: Negative.        {History Review (Optional):12460}         OB History        2    Para   2    Term   2            AB        Living   2       SAB        IAB        Ectopic        Multiple   0    Live Births   2           Obstetric Comments   : 2 C/S             Past Medical History:   Diagnosis Date   • Abnormal Pap smear of cervix    • Abnormal placenta, antepartum     resolved 17   • Depression     post partum   • HPV (human papilloma virus) infection    • Maternal morbid obesity, antepartum (HCC)     resolved 17 obeity complicating pregnancy, child birth or puerperium, antepartum   • Varicella     varicella vaccine       Past Surgical History:   Procedure Laterality Date   •  SECTION   2015   • [place]  DELIVERY ONLY N/A 2019    Procedure:  SECTION () REPEAT;  Surgeon: Sissy Pang MD;  Location: [facility] ;  Service: Obstetrics       Family History   Problem Relation Age of Onset   • No Known Problems Mother    • Hypertension Father    • Celiac disease Sister        Social History     Socioeconomic History   • Marital status: /Civil Union     Spouse name: Not on file   • Number of children: Not on file   • Years of education: Not on file   • Highest education level: Not on file   Occupational History   • Not on file   Tobacco Use   • Smoking status: Never   • Smokeless tobacco: Never   Vaping Use   • Vaping status: Never Used   Substance and Sexual Activity   • Alcohol use: Yes     Comment: social   • Drug use: No   • Sexual activity: Yes     Partners: Male     Birth control/protection: Condom Male   Other Topics Concern   • Not on file   Social History Narrative    Feels safe at home     Social Determinants of Health     Financial Resource Strain: Not on file   Food Insecurity: Not on file   Transportation Needs: Not on file   Physical Activity: Not on file   Stress: Not on file   Social Connections: Not on file   Intimate Partner Violence: Not on file   Housing Stability: Not on file         Current Outpatient Medications:   •  albuterol (PROVENTIL HFA,VENTOLIN HFA) 90 mcg/act inhaler, Inhale 2 puffs every 4 (four) hours as needed, Disp: , Rfl:   •  cetirizine (ZyrTEC) 10 mg tablet, Take 10 mg by mouth daily, Disp: , Rfl:   •  Multiple Vitamin (MULTIVITAMINS PO), Take by mouth, Disp: , Rfl:   •  Turmeric (QC TUMERIC COMPLEX PO), Take by mouth, Disp: , Rfl:   •  amoxicillin-clavulanate (AUGMENTIN) 875-125 mg per tablet, , Disp: , Rfl:     Allergies   Allergen Reactions   • Doxycycline Hives       Objective   Vitals:    24 1406   BP: 124/82   BP Location: Left arm   Patient Position: Sitting   Cuff Size: Large   Weight: 108 kg (239 lb)   Height: 6' (1.829  m)     Physical Exam  Vitals reviewed.   HENT:      Head: Normocephalic and atraumatic.   Cardiovascular:      Rate and Rhythm: Normal rate and regular rhythm.   Pulmonary:      Effort: Pulmonary effort is normal.      Breath sounds: Normal breath sounds.   Chest:   Breasts:     Breasts are symmetrical.      Right: No swelling, bleeding, inverted nipple, mass, nipple discharge, skin change or tenderness.      Left: No swelling, bleeding, inverted nipple, mass, nipple discharge, skin change or tenderness.   Abdominal:      General: Abdomen is flat. Bowel sounds are normal.      Palpations: Abdomen is soft.      Tenderness: There is no abdominal tenderness. There is no right CVA tenderness, left CVA tenderness or guarding.   Genitourinary:     General: Normal vulva.      Pubic Area: No rash.       Labia:         Right: No rash, tenderness, lesion or injury.         Left: No rash, tenderness, lesion or injury.       Urethra: No prolapse, urethral pain, urethral swelling or urethral lesion.      Vagina: Normal. No signs of injury and foreign body. No vaginal discharge or erythema.      Cervix: Normal.      Uterus: Normal.       Adnexa: Right adnexa normal and left adnexa normal.   Musculoskeletal:      Cervical back: Neck supple.   Lymphadenopathy:      Upper Body:      Right upper body: No axillary adenopathy.      Left upper body: No axillary adenopathy.   Skin:     General: Skin is warm and dry.   Neurological:      Mental Status: She is alert and oriented to person, place, and time.   Psychiatric:         Mood and Affect: Mood normal.         Behavior: Behavior normal.         Thought Content: Thought content normal.         Judgment: Judgment normal.       There are no Patient Instructions on file for this visit.

## 2024-07-09 ENCOUNTER — ANNUAL EXAM (OUTPATIENT)
Dept: OBGYN CLINIC | Facility: CLINIC | Age: 34
End: 2024-07-09
Payer: COMMERCIAL

## 2024-07-09 VITALS
SYSTOLIC BLOOD PRESSURE: 124 MMHG | BODY MASS INDEX: 32.37 KG/M2 | DIASTOLIC BLOOD PRESSURE: 82 MMHG | HEIGHT: 72 IN | WEIGHT: 239 LBS

## 2024-07-09 DIAGNOSIS — Z01.419 WELL WOMAN EXAM: Primary | ICD-10-CM

## 2024-07-09 PROCEDURE — 99395 PREV VISIT EST AGE 18-39: CPT | Performed by: PHYSICIAN ASSISTANT

## 2024-07-09 RX ORDER — ALBUTEROL SULFATE 90 UG/1
2 AEROSOL, METERED RESPIRATORY (INHALATION) EVERY 4 HOURS PRN
COMMUNITY
Start: 2024-01-18

## 2024-07-09 RX ORDER — CETIRIZINE HYDROCHLORIDE 10 MG/1
10 TABLET ORAL DAILY
COMMUNITY

## 2024-07-09 NOTE — PROGRESS NOTES
Assessment & Plan   Diagnoses and all orders for this visit:    Well woman exam  -     Thinprep Tis and HPV mRNA E6/E7    Other orders  -     albuterol (PROVENTIL HFA,VENTOLIN HFA) 90 mcg/act inhaler; Inhale 2 puffs every 4 (four) hours as needed  -     cetirizine (ZyrTEC) 10 mg tablet; Take 10 mg by mouth daily        Discussion    All questions have been answered to her satisfaction  Pap screen was completed during visit today. Will message the patient with the results.   RTO for APE or sooner if needed      Subjective     HPI   Bertha Rodríguez is a 34 y.o. female who presents for annual well woman exam. The patient reports heavy menstrual bleeding since her Nexplanon was removed.     The patient has her Nexplanon removed in December 2023 due to excess bleeding for 5 months while on it. She notes in the past she has tried OCPs which she felt caused emotional stress. She has also tried the Nuva ring in the past but did not like the feeling when she used it.     LMP - 6/30/2024 ; Periods are reg q21-28  days and last  7 days; Patient reports excessive bleeding, changing her super tampon and pad every 2-3 hours. She notes the presence of large clots on days 1-2; No intermenstrual bleeding or spotting; Cramps are tolerable.  No vulvar itch/burn; No vaginal itch/burn; No abn discharge or odor; No urinary sx - burning/pain/frequency/hematuria      +SBE--No concerning breast masses, asymmetry, nipple discharge or bleeding, changes in skin of breast, or breast tenderness bilaterally    No abd/pelvic pain, edema of legs, or HAs;     No menopausal symptoms.    Pt is sexually active in a mutually monog/ sexual relationship; No issues with intercourse; She declines sti/hiv/hep testing; Feels safe at home  Current contraception: condoms    (+) PCP for routine Bw/care; Denies any changes to her medical history.     Last Pap : 3/14/2023  History of abnormal Pap smear: Patient reports + HPV in 2010, no abnormal paps since.    Mammo: Not eligible due to age  Colonoscopy:Not eligible due to age    Review of Systems   Constitutional: Negative.  Negative for appetite change, chills, fatigue, fever and unexpected weight change.   Respiratory: Negative.     Cardiovascular: Negative.    Gastrointestinal: Negative.  Negative for abdominal pain, blood in stool, constipation, diarrhea, nausea and vomiting.   Genitourinary:  Positive for menstrual problem (excessive bleed). Negative for dyspareunia, dysuria, frequency, pelvic pain, urgency, vaginal discharge and vaginal pain.   Neurological: Negative.        The following portions of the patient's history were reviewed and updated as appropriate: allergies, current medications, past family history, past medical history, past social history, past surgical history, and problem list.         OB History          2    Para   2    Term   2            AB        Living   2         SAB        IAB        Ectopic        Multiple   0    Live Births   2           Obstetric Comments   : 2 C/S               Past Medical History:   Diagnosis Date    Abnormal Pap smear of cervix     Abnormal placenta, antepartum     resolved 17    Depression     post partum    HPV (human papilloma virus) infection     Maternal morbid obesity, antepartum (HCC)     resolved 16 obeity complicating pregnancy, child birth or puerperium, antepartum    Varicella     varicella vaccine       Past Surgical History:   Procedure Laterality Date     SECTION  2015    [place]  DELIVERY ONLY N/A 2019    Procedure:  SECTION () REPEAT;  Surgeon: Sissy Pang MD;  Location[facility];  Service: Obstetrics       Family History   Problem Relation Age of Onset    No Known Problems Mother     Hypertension Father     Celiac disease Sister        Social History     Socioeconomic History    Marital status: /Civil Union     Spouse name: Not on file    Number of children: Not on file     Years of education: Not on file    Highest education level: Not on file   Occupational History    Not on file   Tobacco Use    Smoking status: Never    Smokeless tobacco: Never   Vaping Use    Vaping status: Never Used   Substance and Sexual Activity    Alcohol use: Yes     Comment: social    Drug use: No    Sexual activity: Yes     Partners: Male     Birth control/protection: Condom Male   Other Topics Concern    Not on file   Social History Narrative    Feels safe at home     Social Determinants of Health     Financial Resource Strain: Not on file   Food Insecurity: Not on file   Transportation Needs: Not on file   Physical Activity: Not on file   Stress: Not on file   Social Connections: Not on file   Intimate Partner Violence: Not on file   Housing Stability: Not on file         Current Outpatient Medications:     albuterol (PROVENTIL HFA,VENTOLIN HFA) 90 mcg/act inhaler, Inhale 2 puffs every 4 (four) hours as needed, Disp: , Rfl:     cetirizine (ZyrTEC) 10 mg tablet, Take 10 mg by mouth daily, Disp: , Rfl:     Multiple Vitamin (MULTIVITAMINS PO), Take by mouth, Disp: , Rfl:     Turmeric (QC TUMERIC COMPLEX PO), Take by mouth, Disp: , Rfl:     amoxicillin-clavulanate (AUGMENTIN) 875-125 mg per tablet, , Disp: , Rfl:     Allergies   Allergen Reactions    Doxycycline Hives       Objective   Vitals:    07/09/24 1406   BP: 124/82   BP Location: Left arm   Patient Position: Sitting   Cuff Size: Large   Weight: 108 kg (239 lb)   Height: 6' (1.829 m)     Physical Exam  Exam conducted with a chaperone present.   Constitutional:       General: She is not in acute distress.     Appearance: Normal appearance. She is not ill-appearing.   HENT:      Head: Normocephalic and atraumatic.   Cardiovascular:      Rate and Rhythm: Normal rate and regular rhythm.      Heart sounds: Normal heart sounds. No murmur heard.     No friction rub. No gallop.   Pulmonary:      Effort: Pulmonary effort is normal.      Breath sounds: Normal  breath sounds. No wheezing, rhonchi or rales.   Chest:   Breasts:     Breasts are symmetrical.      Right: Normal. No swelling, bleeding, inverted nipple, mass, nipple discharge, skin change or tenderness.      Left: Normal. No swelling, bleeding, inverted nipple, mass, nipple discharge, skin change or tenderness.   Abdominal:      General: Abdomen is flat. There is no distension.      Palpations: Abdomen is soft. There is no mass.      Tenderness: There is no abdominal tenderness. There is no right CVA tenderness, left CVA tenderness or rebound.   Genitourinary:     General: Normal vulva.      Exam position: Supine.      Pubic Area: No rash.       Labia:         Right: No rash, tenderness or lesion.         Left: No rash, tenderness or lesion.       Urethra: No prolapse.      Vagina: Normal. No signs of injury. No vaginal discharge, erythema or tenderness.      Cervix: Normal. No discharge, lesion or cervical bleeding.      Uterus: Normal. Not enlarged and not tender.       Adnexa: Right adnexa normal and left adnexa normal.        Right: No mass, tenderness or fullness.          Left: No mass, tenderness or fullness.        Comments: Pap was obtained during the exam.   Musculoskeletal:      Cervical back: Normal range of motion and neck supple.      Right lower leg: No edema.      Left lower leg: No edema.   Skin:     General: Skin is warm and dry.   Neurological:      General: No focal deficit present.      Mental Status: She is alert. Mental status is at baseline.   Psychiatric:         Mood and Affect: Mood normal.         Behavior: Behavior normal.         There are no Patient Instructions on file for this visit.

## 2024-07-09 NOTE — PROGRESS NOTES
Assessment & Plan   Diagnoses and all orders for this visit:    Well woman exam  -     Thinprep Tis and HPV mRNA E6/E7    Other orders  -     albuterol (PROVENTIL HFA,VENTOLIN HFA) 90 mcg/act inhaler; Inhale 2 puffs every 4 (four) hours as needed  -     cetirizine (ZyrTEC) 10 mg tablet; Take 10 mg by mouth daily        Discussion    All questions have been answered to her satisfaction  Pap screen was completed during visit today. Will message the patient with the results.   RTO for APE or sooner if needed  Pt notes clotting is problematic for her. She has tried hormonal tx options in the past but not interested in retrying those.   US done 12/23 showed adenomyosis.   I r/w pt heavy menstrual cycles. We reviewed multiple different tx options that can help w menses including but not limited to: NSAIDs, Lysteda, hormonal options including OCPs, IUDs and Depo Provera, as well as surgical options including endometrial ablation and hysterectomy. I r/w pt risks and benefits of each therapy option and side effects as well.   Pt is most interested in definitive surgical tx a this point.   Will plan f/u appt w doc to discuss surgical options. Declines desire for more children.         Subjective     HPI   Bertha Rodríguez is a 34 y.o. female who presents for annual well woman exam. The patient reports heavy menstrual bleeding since her Nexplanon was removed.     The patient has her Nexplanon removed in December 2023 due to excess bleeding for 5 months while on it. She notes in the past she has tried OCPs which she felt caused emotional stress. She has also tried the Nuva ring in the past but did not like the feeling when she used it.     LMP - 6/30/2024 ; Periods are reg q21-28  days and last  7 days; Patient reports excessive bleeding, changing her super tampon and pad every 2-3 hours. She notes the presence of large clots on days 1-2, notes they will fill her hand up. No intermenstrual bleeding or spotting; Cramps are  tolerable.  No vulvar itch/burn; No vaginal itch/burn; No abn discharge or odor; No urinary sx - burning/pain/frequency/hematuria      +SBE--No concerning breast masses, asymmetry, nipple discharge or bleeding, changes in skin of breast, or breast tenderness bilaterally    No abd/pelvic pain, edema of legs, or HAs;     Pt is sexually active in a mutually monog/ sexual relationship; No issues with intercourse; She declines sti/hiv/hep testing; Feels safe at home  Current contraception: condoms    (+) PCP for routine Bw/care; Denies any changes to her medical history.     Last Pap : 3/14/2023  History of abnormal Pap smear: Patient reports + HPV in , no abnormal paps since.   Mammo: Not eligible due to age  Colonoscopy:Not eligible due to age    Review of Systems   Constitutional: Negative.  Negative for appetite change, chills, fatigue, fever and unexpected weight change.   Respiratory: Negative.     Cardiovascular: Negative.    Gastrointestinal: Negative.  Negative for abdominal pain, blood in stool, constipation, diarrhea, nausea and vomiting.   Genitourinary:  Positive for menstrual problem (excessive bleed). Negative for dyspareunia, dysuria, frequency, pelvic pain, urgency, vaginal discharge and vaginal pain.   Neurological: Negative.        The following portions of the patient's history were reviewed and updated as appropriate: allergies, current medications, past family history, past medical history, past social history, past surgical history, and problem list.         OB History        2    Para   2    Term   2            AB        Living   2       SAB        IAB        Ectopic        Multiple   0    Live Births   2           Obstetric Comments   : 2 C/S             Past Medical History:   Diagnosis Date   • Abnormal Pap smear of cervix    • Abnormal placenta, antepartum     resolved 17   • Depression     post partum   • HPV (human papilloma virus) infection    • Maternal  morbid obesity, antepartum (HCC)     resolved 17 obeity complicating pregnancy, child birth or puerperium, antepartum   • Varicella     varicella vaccine       Past Surgical History:   Procedure Laterality Date   •  SECTION  2015   • [facility] N/A 2019    Procedure:  SECTION () REPEAT;  Surgeon: Sissy Pang MD;  Location: [facility] ;  Service: Obstetrics       Family History   Problem Relation Age of Onset   • No Known Problems Mother    • Hypertension Father    • Celiac disease Sister        Social History     Socioeconomic History   • Marital status: /Civil Union     Spouse name: Not on file   • Number of children: Not on file   • Years of education: Not on file   • Highest education level: Not on file   Occupational History   • Not on file   Tobacco Use   • Smoking status: Never   • Smokeless tobacco: Never   Vaping Use   • Vaping status: Never Used   Substance and Sexual Activity   • Alcohol use: Yes     Comment: social   • Drug use: No   • Sexual activity: Yes     Partners: Male     Birth control/protection: Condom Male   Other Topics Concern   • Not on file   Social History Narrative    Feels safe at home     Social Determinants of Health     Financial Resource Strain: Not on file   Food Insecurity: Not on file   Transportation Needs: Not on file   Physical Activity: Not on file   Stress: Not on file   Social Connections: Not on file   Intimate Partner Violence: Not on file   Housing Stability: Not on file         Current Outpatient Medications:   •  albuterol (PROVENTIL HFA,VENTOLIN HFA) 90 mcg/act inhaler, Inhale 2 puffs every 4 (four) hours as needed, Disp: , Rfl:   •  cetirizine (ZyrTEC) 10 mg tablet, Take 10 mg by mouth daily, Disp: , Rfl:   •  Multiple Vitamin (MULTIVITAMINS PO), Take by mouth, Disp: , Rfl:   •  Turmeric (QC TUMERIC COMPLEX PO), Take by mouth, Disp: , Rfl:   •  amoxicillin-clavulanate (AUGMENTIN) 875-125 mg per tablet, , Disp: ,  Rfl:     Allergies   Allergen Reactions   • Doxycycline Hives       Objective   Vitals:    07/09/24 1406   BP: 124/82   BP Location: Left arm   Patient Position: Sitting   Cuff Size: Large   Weight: 108 kg (239 lb)   Height: 6' (1.829 m)     Physical Exam  Exam conducted with a chaperone present.   Constitutional:       General: She is not in acute distress.     Appearance: Normal appearance. She is not ill-appearing.   HENT:      Head: Normocephalic and atraumatic.   Cardiovascular:      Rate and Rhythm: Normal rate and regular rhythm.      Heart sounds: Normal heart sounds. No murmur heard.     No friction rub. No gallop.   Pulmonary:      Effort: Pulmonary effort is normal.      Breath sounds: Normal breath sounds. No wheezing, rhonchi or rales.   Chest:   Breasts:     Breasts are symmetrical.      Right: Normal. No swelling, bleeding, inverted nipple, mass, nipple discharge, skin change or tenderness.      Left: Normal. No swelling, bleeding, inverted nipple, mass, nipple discharge, skin change or tenderness.   Abdominal:      General: Abdomen is flat. There is no distension.      Palpations: Abdomen is soft. There is no mass.      Tenderness: There is no abdominal tenderness. There is no right CVA tenderness, left CVA tenderness or rebound.   Genitourinary:     General: Normal vulva.      Exam position: Supine.      Pubic Area: No rash.       Labia:         Right: No rash, tenderness or lesion.         Left: No rash, tenderness or lesion.       Urethra: No prolapse.      Vagina: Normal. No signs of injury. No vaginal discharge, erythema or tenderness.      Cervix: Normal. No discharge, lesion or cervical bleeding.      Uterus: Normal. Not enlarged and not tender.       Adnexa: Right adnexa normal and left adnexa normal.        Right: No mass, tenderness or fullness.          Left: No mass, tenderness or fullness.        Comments: Pap was obtained during the exam.   Musculoskeletal:      Cervical back: Normal  range of motion and neck supple.      Right lower leg: No edema.      Left lower leg: No edema.   Skin:     General: Skin is warm and dry.   Neurological:      General: No focal deficit present.      Mental Status: She is alert. Mental status is at baseline.   Psychiatric:         Mood and Affect: Mood normal.         Behavior: Behavior normal.         There are no Patient Instructions on file for this visit.

## 2024-09-05 ENCOUNTER — OFFICE VISIT (OUTPATIENT)
Dept: OBGYN CLINIC | Facility: CLINIC | Age: 34
End: 2024-09-05
Payer: COMMERCIAL

## 2024-09-05 VITALS
WEIGHT: 244 LBS | SYSTOLIC BLOOD PRESSURE: 120 MMHG | DIASTOLIC BLOOD PRESSURE: 80 MMHG | HEIGHT: 72 IN | BODY MASS INDEX: 33.05 KG/M2

## 2024-09-05 DIAGNOSIS — N92.0 MENORRHAGIA WITH REGULAR CYCLE: Primary | ICD-10-CM

## 2024-09-05 PROCEDURE — 99213 OFFICE O/P EST LOW 20 MIN: CPT | Performed by: OBSTETRICS & GYNECOLOGY

## 2024-09-05 RX ORDER — PREDNISONE 10 MG/1
TABLET ORAL
COMMUNITY
Start: 2024-07-25

## 2024-09-05 RX ORDER — AMOXICILLIN 875 MG
TABLET ORAL
COMMUNITY
Start: 2024-07-25

## 2024-09-05 NOTE — PROGRESS NOTES
Received bedside/verbal report from off going [address] [place] [address]. Colin Ladd .Bedside and Verbal shift change report given to Bob Levi (oncoming nurse) by Umesh Malloy (offgoing nurse). Report included the following information SBAR, Kardex, Intake/Output, MAR and Recent Results. Subjective     Bertha Rodríguez is a 34 y.o. G2, P2 female here for a problem visit.  Patient with heavy menses at least 7 days each month with clots and cramps and bleeding past protection for 4 of those days.  Patient has tried Nexplanon as well as OCP with continued side effects and continued heavy bleeding.  Ultrasound consistent with adenomyosis.  Patient is concerned with her history of ovarian cyst does not desire to trial Mirena also concerned regarding progesterone side effects and irregular bleeding that may first occur.  We also reviewed ablation but patient has significant dysmenorrhea with her cycles.  We reviewed LAVH bilateral salpingectomy with exam under anesthesia and cystoscopy we discussed procedure as well as risks and benefits expected outcomes expected recovery and surgical instructions.  Patient was given 2 carb drinks and a surgical wash as well as surgical booklet.  Unfortunately the carb drink instructions were not available in the office today.  Patient reports no further questions and expressed a desire for early December if available.      Gynecologic History  No LMP recorded.  Contraception: condoms  Last Pap: 2024. Results were: normal      Obstetric History  OB History    Para Term  AB Living   2 2 2     2   SAB IAB Ectopic Multiple Live Births         0 2      # Outcome Date GA Lbr Chan/2nd Weight Sex Type Anes PTL Lv   2 Term 19 37w6d  3527 g (7 lb 12.4 oz) M CS-LTranv Spinal  XAVIER   1 Term 09/12/15 39w0d  3090 g (6 lb 13 oz) F CS-LTranv EPI  XAVIER      Complications: Fetal Intolerance      Obstetric Comments   : 2 C/S     Past Medical History:   Diagnosis Date    Abnormal Pap smear of cervix     Abnormal placenta, antepartum     resolved 17    Depression     post partum    HPV (human papilloma virus) infection     Maternal morbid obesity, antepartum (HCC)     resolved 17 obeity complicating pregnancy, child birth or puerperium, antepartum     Varicella     varicella vaccine     Past Surgical History:   Procedure Laterality Date     SECTION  2015    [place]  DELIV[place] ONLY N/A 2019    Procedure:  SECTION () REPEAT;  Surgeon: Sissy Pang MD;  Location: [facility];  Service: Obstetrics     Current Outpatient Medications   Medication Instructions    albuterol (PROVENTIL HFA,VENTOLIN HFA) 90 mcg/act inhaler 2 puffs, Every 4 hours PRN    amoxicillin (AMOXIL) 875 mg tablet TAKE ONE TABLET BY MOUTH TWO TIMES A DAY FOR 10 DAYS    amoxicillin-clavulanate (AUGMENTIN) 875-125 mg per tablet     cetirizine (ZYRTEC) 10 mg, Oral, Daily    Multiple Vitamin (MULTIVITAMINS PO) Oral    predniSONE 10 mg tablet take 2 tablets by mouth daily for 5 days    Turmeric (QC TUMERIC COMPLEX PO) Oral     Allergies   Allergen Reactions    Doxycycline Hives     Social History     Tobacco Use    Smoking status: Never    Smokeless tobacco: Never   Vaping Use    Vaping status: Never Used   Substance Use Topics    Alcohol use: Not Currently     Alcohol/week: 1.0 standard drink of alcohol     Types: 1 Glasses of wine per week     Comment: social    Drug use: No       Review of Systems  Review of Systems   Constitutional: Negative.  Negative for chills, fatigue, fever and unexpected weight change.   HENT: Negative.  Negative for dental problem, sinus pressure and sinus pain.    Eyes: Negative.  Negative for visual disturbance.   Respiratory: Negative.  Negative for cough, shortness of breath and wheezing.    Cardiovascular: Negative.  Negative for chest pain and leg swelling.   Gastrointestinal: Negative.  Negative for constipation, diarrhea, nausea and vomiting.   Genitourinary:  Negative for menstrual problem, pelvic pain and urgency.        Heavy periods   Musculoskeletal: Negative.  Negative for back pain.   Allergic/Immunologic: Positive for environmental allergies.   Neurological:  Positive for headaches. Negative for dizziness.        Migraines         Objective     /80 (BP Location: Left arm, Patient Position: Sitting, Cuff Size: Large)   Ht 6' (1.829 m)   Wt 111 kg (244 lb)   LMP 2024 (Approximate)   BMI 33.09 kg/m²   General appearance: alert and oriented, in no acute distress  Head: Normocephalic, without obvious abnormality, atraumatic  Lungs: clear to auscultation bilaterally  Heart: regular rate and rhythm, S1, S2 normal, no murmur, click, rub or gallop  Abdomen: soft, non-tender; bowel sounds normal; no masses,  no organomegaly and well-healed  scar Pfannenstiel  Pelvic: external genitalia normal, vagina normal without discharge, uterus normal size, shape, and consistency, no cervical motion tenderness, and no adnexal masses or tenderness  Extremities: extremities normal, warm and well-perfused; no cyanosis, clubbing, or edema      Assessment  34-year-old G2, P2 with menorrhagia and suspected adenomyosis desire definitive therapy.  Patient also recently found out her paternal grandmother had either ovarian or cervical cancer.     Plan  LAVH, bilateral salpingectomy, EUA, cystoscopy

## 2024-09-12 ENCOUNTER — TELEPHONE (OUTPATIENT)
Dept: OBGYN CLINIC | Facility: CLINIC | Age: 34
End: 2024-09-12

## 2024-09-12 NOTE — TELEPHONE ENCOUNTER
----- Message from Liza Busby MD sent at 2024  1:57 PM EDT -----  Regarding: nena, bs, eua, cysto  [facility] OB GYN Department  Surgery Scheduling Sheet    Patient Name: Bertha Rodríguez  : 1990    Provider: Liza Busby MD     Needed: no; Language: N/A    Procedure: Exam under anesthesia, LAVH bilateral salpingectomy, cystoscopy    Diagnosis: Menorrhagia suspected adenomyosis    Special Needs or Equipment: none    Anesthesia: General anesthesia    Length of stay: outpatient  Does patient have comorbid conditions that will require close perioperative monitoring prior to safe discharge: no    The patient has comorbid conditions that will require close perioperative monitoring prior to safe discharge, including N/A.   This may require acute care beyond the usual and routine recovery period. As such, inpatient admission post-operatively is expected and appropriate, and anticipated hospital length of stay will be >2 midnights.    Pre-Admission Testing Needed: yes   Labs that should be ordered: cbc, hgb A1C, type and screen, cmp, and urine pregnancy test    Order PAT that is recommended in prep for procedure?:  Please do    Medical Clearance Needed: no; Provider: N/A    MA Form Signed (tubals/hysterectomy): Not Indicated    Surgical Drink Given: yes     How many days out of work: 6 week(s)     How many days no drivin day(s)       Is pre op appt needed?  no  Interval for post op appt: 1,3,6 weeks    For Surgical Scheduler: Patient preferences early December    Surgery Scheduled On:  [place]:     Pre-op Appt:   Post op Appt:  Consult/Medical clearance appt:

## 2024-11-22 ENCOUNTER — LAB REQUISITION (OUTPATIENT)
Dept: LAB | Facility: HOSPITAL | Age: 34
End: 2024-11-22
Payer: COMMERCIAL

## 2024-11-22 ENCOUNTER — APPOINTMENT (OUTPATIENT)
Dept: LAB | Facility: CLINIC | Age: 34
End: 2024-11-22
Payer: COMMERCIAL

## 2024-11-22 DIAGNOSIS — N92.0 MENORRHAGIA WITH REGULAR CYCLE: ICD-10-CM

## 2024-11-22 DIAGNOSIS — N92.0 EXCESSIVE AND FREQUENT MENSTRUATION WITH REGULAR CYCLE: ICD-10-CM

## 2024-11-22 DIAGNOSIS — N80.03 ADENOMYOSIS: ICD-10-CM

## 2024-11-22 DIAGNOSIS — N80.03 ADENOMYOSIS OF THE UTERUS: ICD-10-CM

## 2024-11-22 LAB
ABO GROUP BLD: NORMAL
ALBUMIN SERPL BCG-MCNC: 4.1 G/DL (ref 3.5–5)
ALP SERPL-CCNC: 35 U/L (ref 34–104)
ALT SERPL W P-5'-P-CCNC: 8 U/L (ref 7–52)
ANION GAP SERPL CALCULATED.3IONS-SCNC: 4 MMOL/L (ref 4–13)
AST SERPL W P-5'-P-CCNC: 12 U/L (ref 13–39)
BASOPHILS # BLD AUTO: 0.03 THOUSANDS/ÂΜL (ref 0–0.1)
BASOPHILS NFR BLD AUTO: 1 % (ref 0–1)
BILIRUB SERPL-MCNC: 0.63 MG/DL (ref 0.2–1)
BLD GP AB SCN SERPL QL: NEGATIVE
BUN SERPL-MCNC: 12 MG/DL (ref 5–25)
CALCIUM SERPL-MCNC: 9.1 MG/DL (ref 8.4–10.2)
CHLORIDE SERPL-SCNC: 104 MMOL/L (ref 96–108)
CO2 SERPL-SCNC: 29 MMOL/L (ref 21–32)
CREAT SERPL-MCNC: 0.63 MG/DL (ref 0.6–1.3)
EOSINOPHIL # BLD AUTO: 0.16 THOUSAND/ÂΜL (ref 0–0.61)
EOSINOPHIL NFR BLD AUTO: 3 % (ref 0–6)
ERYTHROCYTE [DISTWIDTH] IN BLOOD BY AUTOMATED COUNT: 12.8 % (ref 11.6–15.1)
EST. AVERAGE GLUCOSE BLD GHB EST-MCNC: 100 MG/DL
GFR SERPL CREATININE-BSD FRML MDRD: 116 ML/MIN/1.73SQ M
GLUCOSE SERPL-MCNC: 99 MG/DL (ref 65–140)
HBA1C MFR BLD: 5.1 %
HCT VFR BLD AUTO: 37.7 % (ref 34.8–46.1)
HGB BLD-MCNC: 12.5 G/DL (ref 11.5–15.4)
IMM GRANULOCYTES # BLD AUTO: 0 THOUSAND/UL (ref 0–0.2)
IMM GRANULOCYTES NFR BLD AUTO: 0 % (ref 0–2)
LYMPHOCYTES # BLD AUTO: 1.5 THOUSANDS/ÂΜL (ref 0.6–4.47)
LYMPHOCYTES NFR BLD AUTO: 29 % (ref 14–44)
MCH RBC QN AUTO: 28.2 PG (ref 26.8–34.3)
MCHC RBC AUTO-ENTMCNC: 33.2 G/DL (ref 31.4–37.4)
MCV RBC AUTO: 85 FL (ref 82–98)
MONOCYTES # BLD AUTO: 0.39 THOUSAND/ÂΜL (ref 0.17–1.22)
MONOCYTES NFR BLD AUTO: 8 % (ref 4–12)
NEUTROPHILS # BLD AUTO: 3.06 THOUSANDS/ÂΜL (ref 1.85–7.62)
NEUTS SEG NFR BLD AUTO: 59 % (ref 43–75)
NRBC BLD AUTO-RTO: 0 /100 WBCS
PLATELET # BLD AUTO: 254 THOUSANDS/UL (ref 149–390)
PMV BLD AUTO: 11.4 FL (ref 8.9–12.7)
POTASSIUM SERPL-SCNC: 4.1 MMOL/L (ref 3.5–5.3)
PROT SERPL-MCNC: 6.7 G/DL (ref 6.4–8.4)
RBC # BLD AUTO: 4.43 MILLION/UL (ref 3.81–5.12)
RH BLD: POSITIVE
SODIUM SERPL-SCNC: 137 MMOL/L (ref 135–147)
SPECIMEN EXPIRATION DATE: NORMAL
WBC # BLD AUTO: 5.14 THOUSAND/UL (ref 4.31–10.16)

## 2024-11-22 PROCEDURE — 80053 COMPREHEN METABOLIC PANEL: CPT

## 2024-11-22 PROCEDURE — 86901 BLOOD TYPING SEROLOGIC RH(D): CPT | Performed by: OBSTETRICS & GYNECOLOGY

## 2024-11-22 PROCEDURE — 83036 HEMOGLOBIN GLYCOSYLATED A1C: CPT

## 2024-11-22 PROCEDURE — 86900 BLOOD TYPING SEROLOGIC ABO: CPT | Performed by: OBSTETRICS & GYNECOLOGY

## 2024-11-22 PROCEDURE — 85025 COMPLETE CBC W/AUTO DIFF WBC: CPT

## 2024-11-22 PROCEDURE — 36415 COLL VENOUS BLD VENIPUNCTURE: CPT

## 2024-11-22 PROCEDURE — 86850 RBC ANTIBODY SCREEN: CPT | Performed by: OBSTETRICS & GYNECOLOGY

## 2024-11-26 LAB
ABO GROUP BLD: NORMAL
BLD GP AB SCN SERPL QL: NEGATIVE
RH BLD: POSITIVE
SPECIMEN EXPIRATION DATE: NORMAL

## 2024-11-27 NOTE — PRE-PROCEDURE INSTRUCTIONS
Pre-Surgery Instructions:   Medication Instructions    Emollient (Collagen) CREA Hold day of surgery.    Multiple Vitamin (MULTIVITAMINS PO) Stop taking 7 days prior to surgery.      Medication instructions for day surgery reviewed. Please use only a sip of water to take your instructed medications. Avoid all over the counter vitamins, supplements and NSAIDS for one week prior to surgery per anesthesia guidelines. Tylenol is ok to take as needed.     You will receive a call one business day prior to surgery with an arrival time and hospital directions. If your surgery is scheduled on a Monday, the hospital will be calling you on the Friday prior to your surgery. If you have not heard from anyone by 8pm, please call the hospital supervisor through the hospital  at [phone]. ([place] [phone] or [place] [phone]).    Do not eat or drink anything after midnight the night before your surgery, including candy, mints, lifesavers, or chewing gum. Do not drink alcohol 24hrs before your surgery. Try not to smoke at least 24hrs before your surgery.       Follow the pre surgery showering instructions as listed in the “My Surgical Experience Booklet” or otherwise provided by your surgeon's office. Do not use a blade to shave the surgical area 1 week before surgery. It is okay to use a clean electric clippers up to 24 hours before surgery. Do not apply any lotions, creams, including makeup, cologne, deodorant, or perfumes after showering on the day of your surgery. Do not use dry shampoo, hair spray, hair gel, or any type of hair products.     No contact lenses, eye make-up, or artificial eyelashes. Remove nail polish, including gel polish, and any artificial, gel, or acrylic nails if possible. Remove all jewelry including rings and body piercing jewelry.     Wear causal clothing that is easy to take on and off. Consider your type of surgery.    Keep any valuables, jewelry, piercings at home. Please bring  any specially ordered equipment (sling, braces) if indicated.    Arrange for a responsible person to drive you to and from the hospital on the day of your surgery. Please confirm the visitor policy for the day of your procedure when you receive your phone call with an arrival time.     Call the surgeon's office with any new illnesses, exposures, or additional questions prior to surgery.    Please reference your “My Surgical Experience Booklet” for additional information to prepare for your upcoming surgery.

## 2024-12-05 PROCEDURE — NC001 PR NO CHARGE: Performed by: OBSTETRICS & GYNECOLOGY

## 2024-12-05 NOTE — H&P
Bertha Rodríguez is a 34-year-old  with heavy menses bleeding past protection 4 days out of a 7-day cycle monthly accompanied by significant dysmenorrhea.  Patient has tried Nexplanon and OCP with side effects and continued bleeding.  Ultrasound consistent with adenomyosis.  After reviewing options to treat patient desires definitive therapy.  We discussed LAVH bilateral salpingectomy with exam under anesthesia and cystoscopy procedure as well as risks and benefits expected outcomes and surgical instructions.  Patient reports no further questions and desires to proceed.    GYN history  Contraception: Condoms  Last Pap 2024 normal    Past Medical History:   Diagnosis Date    Abnormal Pap smear of cervix     Abnormal placenta, antepartum     resolved 17    Depression     post partum    HPV (human papilloma virus) infection     Maternal morbid obesity, antepartum (HCC)     resolved 17 obeity complicating pregnancy, child birth or puerperium, antepartum    Varicella     varicella vaccine     Past Surgical History:   Procedure Laterality Date     SECTION  2015    [place]  DELIV[place] ONLY N/A 2019    Procedure:  SECTION () REPEAT;  Surgeon: Sissy Pang MD;  Location: [facility];  Service: Obstetrics    WISDOM TOOTH EXTRACTION N/A      OB History          2    Para   2    Term   2            AB        Living   2         SAB        IAB        Ectopic        Multiple   0    Live Births   2           Obstetric Comments   : 2 C/S             Current Outpatient Medications   Medication Instructions    albuterol (PROVENTIL HFA,VENTOLIN HFA) 90 mcg/act inhaler 2 puffs, Every 4 hours PRN    amoxicillin (AMOXIL) 875 mg tablet TAKE ONE TABLET BY MOUTH TWO TIMES A DAY FOR 10 DAYS    amoxicillin-clavulanate (AUGMENTIN) 875-125 mg per tablet     cetirizine (ZYRTEC) 10 mg, Oral, Daily    Emollient (Collagen) CREA Apply topically    Multiple Vitamin (MULTIVITAMINS PO)  Take by mouth    predniSONE 10 mg tablet take 2 tablets by mouth daily for 5 days    Turmeric (QC TUMERIC COMPLEX PO) Oral     Allergies   Allergen Reactions    Doxycycline Hives     Social History     Tobacco Use    Smoking status: Never    Smokeless tobacco: Never   Vaping Use    Vaping status: Never Used   Substance Use Topics    Alcohol use: Not Currently     Alcohol/week: 1.0 standard drink of alcohol     Types: 1 Glasses of wine per week     Comment: social    Drug use: No     Physical exam: Height 6 feet, weight 244 pounds (111 kg), BMI 33.09, /80  Head: Normocephalic atraumatic  Lungs: Clear to auscultation bilaterally  Heart: Regular rate rhythm S1-S2  Abdomen: Soft nontender plus bowel sounds organomegaly well-healed Pfannenstiel incision  Pelvic: Normal external female genitalia, vagina normal without discharge, uterus normal shape size and consistency anteverted no cervical motion tenderness no adnexal masses or tenderness  Extremities: Normal warm well-perfused no cyanosis clubbing or edema    Assessment: 34-year-old G2, P2 with menorrhagia suspected adenomyosis desiring definitive therapy.  Plan: LAVH bilateral salpingectomy exam under anesthesia cystoscopy

## 2024-12-09 ENCOUNTER — ANESTHESIA EVENT (OUTPATIENT)
Dept: PERIOP | Facility: HOSPITAL | Age: 34
End: 2024-12-09
Payer: COMMERCIAL

## 2024-12-10 ENCOUNTER — HOSPITAL ENCOUNTER (OUTPATIENT)
Facility: HOSPITAL | Age: 34
Setting detail: OUTPATIENT SURGERY
Discharge: HOME | End: 2024-12-10
Attending: OBSTETRICS & GYNECOLOGY | Admitting: OBSTETRICS & GYNECOLOGY
Payer: COMMERCIAL

## 2024-12-10 ENCOUNTER — ANESTHESIA (OUTPATIENT)
Dept: PERIOP | Facility: HOSPITAL | Age: 34
End: 2024-12-10
Payer: COMMERCIAL

## 2024-12-10 VITALS
TEMPERATURE: 98 F | OXYGEN SATURATION: 97 % | HEIGHT: 72 IN | RESPIRATION RATE: 17 BRPM | WEIGHT: 255.73 LBS | BODY MASS INDEX: 34.64 KG/M2 | DIASTOLIC BLOOD PRESSURE: 89 MMHG | SYSTOLIC BLOOD PRESSURE: 140 MMHG | HEART RATE: 66 BPM

## 2024-12-10 DIAGNOSIS — N80.03 ADENOMYOSIS: ICD-10-CM

## 2024-12-10 DIAGNOSIS — G89.18 POST-OP PAIN: Primary | ICD-10-CM

## 2024-12-10 DIAGNOSIS — N92.0 MENORRHAGIA WITH REGULAR CYCLE: ICD-10-CM

## 2024-12-10 PROBLEM — F32.A DEPRESSION: Status: RESOLVED | Noted: 2019-12-31 | Resolved: 2024-12-10

## 2024-12-10 PROBLEM — N93.9 ABNORMAL UTERINE BLEEDING (AUB): Status: ACTIVE | Noted: 2024-12-10

## 2024-12-10 LAB
EXT PREGNANCY TEST URINE: NEGATIVE
EXT. CONTROL: NORMAL
GLUCOSE SERPL-MCNC: 109 MG/DL (ref 65–140)

## 2024-12-10 PROCEDURE — 88307 TISSUE EXAM BY PATHOLOGIST: CPT | Performed by: PATHOLOGY

## 2024-12-10 PROCEDURE — 81025 URINE PREGNANCY TEST: CPT | Performed by: OBSTETRICS & GYNECOLOGY

## 2024-12-10 PROCEDURE — 82948 REAGENT STRIP/BLOOD GLUCOSE: CPT

## 2024-12-10 PROCEDURE — 58552 LAPARO-VAG HYST INCL T/O: CPT | Performed by: OBSTETRICS & GYNECOLOGY

## 2024-12-10 RX ORDER — FENTANYL CITRATE/PF 50 MCG/ML
25 SYRINGE (ML) INJECTION
Status: DISCONTINUED | OUTPATIENT
Start: 2024-12-10 | End: 2024-12-10 | Stop reason: HOSPADM

## 2024-12-10 RX ORDER — PROMETHAZINE HYDROCHLORIDE 25 MG/ML
25 INJECTION, SOLUTION INTRAMUSCULAR; INTRAVENOUS ONCE AS NEEDED
Status: DISCONTINUED | OUTPATIENT
Start: 2024-12-10 | End: 2024-12-10 | Stop reason: HOSPADM

## 2024-12-10 RX ORDER — DEXAMETHASONE SODIUM PHOSPHATE 10 MG/ML
INJECTION, SOLUTION INTRAMUSCULAR; INTRAVENOUS AS NEEDED
Status: DISCONTINUED | OUTPATIENT
Start: 2024-12-10 | End: 2024-12-10

## 2024-12-10 RX ORDER — ONDANSETRON 2 MG/ML
4 INJECTION INTRAMUSCULAR; INTRAVENOUS EVERY 6 HOURS PRN
Status: DISCONTINUED | OUTPATIENT
Start: 2024-12-10 | End: 2024-12-10 | Stop reason: HOSPADM

## 2024-12-10 RX ORDER — CEFAZOLIN SODIUM 2 G/50ML
2000 SOLUTION INTRAVENOUS ONCE
Status: COMPLETED | OUTPATIENT
Start: 2024-12-10 | End: 2024-12-10

## 2024-12-10 RX ORDER — SODIUM CHLORIDE, SODIUM LACTATE, POTASSIUM CHLORIDE, CALCIUM CHLORIDE 600; 310; 30; 20 MG/100ML; MG/100ML; MG/100ML; MG/100ML
125 INJECTION, SOLUTION INTRAVENOUS CONTINUOUS
Status: DISCONTINUED | OUTPATIENT
Start: 2024-12-10 | End: 2024-12-10 | Stop reason: HOSPADM

## 2024-12-10 RX ORDER — ALBUTEROL SULFATE 90 UG/1
2 INHALANT RESPIRATORY (INHALATION) EVERY 4 HOURS PRN
Status: DISCONTINUED | OUTPATIENT
Start: 2024-12-10 | End: 2024-12-10 | Stop reason: HOSPADM

## 2024-12-10 RX ORDER — KETOROLAC TROMETHAMINE 30 MG/ML
INJECTION, SOLUTION INTRAMUSCULAR; INTRAVENOUS AS NEEDED
Status: DISCONTINUED | OUTPATIENT
Start: 2024-12-10 | End: 2024-12-10

## 2024-12-10 RX ORDER — ROCURONIUM BROMIDE 10 MG/ML
INJECTION, SOLUTION INTRAVENOUS AS NEEDED
Status: DISCONTINUED | OUTPATIENT
Start: 2024-12-10 | End: 2024-12-10

## 2024-12-10 RX ORDER — HYDROMORPHONE HCL/PF 1 MG/ML
SYRINGE (ML) INJECTION AS NEEDED
Status: DISCONTINUED | OUTPATIENT
Start: 2024-12-10 | End: 2024-12-10

## 2024-12-10 RX ORDER — OXYCODONE HYDROCHLORIDE 5 MG/1
5 TABLET ORAL EVERY 4 HOURS PRN
Status: DISCONTINUED | OUTPATIENT
Start: 2024-12-10 | End: 2024-12-10 | Stop reason: HOSPADM

## 2024-12-10 RX ORDER — ONDANSETRON 2 MG/ML
4 INJECTION INTRAMUSCULAR; INTRAVENOUS ONCE AS NEEDED
Status: DISCONTINUED | OUTPATIENT
Start: 2024-12-10 | End: 2024-12-10 | Stop reason: HOSPADM

## 2024-12-10 RX ORDER — IBUPROFEN 600 MG/1
600 TABLET, FILM COATED ORAL EVERY 6 HOURS PRN
Status: DISCONTINUED | OUTPATIENT
Start: 2024-12-10 | End: 2024-12-10 | Stop reason: HOSPADM

## 2024-12-10 RX ORDER — DIPHENHYDRAMINE HYDROCHLORIDE 50 MG/ML
12.5 INJECTION INTRAMUSCULAR; INTRAVENOUS ONCE AS NEEDED
Status: DISCONTINUED | OUTPATIENT
Start: 2024-12-10 | End: 2024-12-10 | Stop reason: HOSPADM

## 2024-12-10 RX ORDER — LIDOCAINE HYDROCHLORIDE 10 MG/ML
INJECTION, SOLUTION EPIDURAL; INFILTRATION; INTRACAUDAL; PERINEURAL AS NEEDED
Status: DISCONTINUED | OUTPATIENT
Start: 2024-12-10 | End: 2024-12-10

## 2024-12-10 RX ORDER — SENNOSIDES 8.6 MG
650 CAPSULE ORAL EVERY 8 HOURS PRN
Qty: 30 TABLET | Refills: 0 | Status: SHIPPED | OUTPATIENT
Start: 2024-12-10

## 2024-12-10 RX ORDER — ONDANSETRON 2 MG/ML
INJECTION INTRAMUSCULAR; INTRAVENOUS AS NEEDED
Status: DISCONTINUED | OUTPATIENT
Start: 2024-12-10 | End: 2024-12-10

## 2024-12-10 RX ORDER — FENTANYL CITRATE 50 UG/ML
INJECTION, SOLUTION INTRAMUSCULAR; INTRAVENOUS AS NEEDED
Status: DISCONTINUED | OUTPATIENT
Start: 2024-12-10 | End: 2024-12-10

## 2024-12-10 RX ORDER — BUPIVACAINE HYDROCHLORIDE 2.5 MG/ML
INJECTION, SOLUTION EPIDURAL; INFILTRATION; INTRACAUDAL AS NEEDED
Status: DISCONTINUED | OUTPATIENT
Start: 2024-12-10 | End: 2024-12-10 | Stop reason: HOSPADM

## 2024-12-10 RX ORDER — OXYCODONE HYDROCHLORIDE 5 MG/1
5 TABLET ORAL EVERY 6 HOURS PRN
Qty: 15 TABLET | Refills: 0 | Status: SHIPPED | OUTPATIENT
Start: 2024-12-10 | End: 2024-12-20

## 2024-12-10 RX ORDER — IBUPROFEN 600 MG/1
600 TABLET, FILM COATED ORAL EVERY 6 HOURS PRN
Qty: 30 TABLET | Refills: 0 | Status: SHIPPED | OUTPATIENT
Start: 2024-12-10

## 2024-12-10 RX ORDER — SODIUM CHLORIDE, SODIUM LACTATE, POTASSIUM CHLORIDE, CALCIUM CHLORIDE 600; 310; 30; 20 MG/100ML; MG/100ML; MG/100ML; MG/100ML
125 INJECTION, SOLUTION INTRAVENOUS CONTINUOUS
Status: DISCONTINUED | OUTPATIENT
Start: 2024-12-10 | End: 2024-12-10

## 2024-12-10 RX ORDER — MIDAZOLAM HYDROCHLORIDE 2 MG/2ML
INJECTION, SOLUTION INTRAMUSCULAR; INTRAVENOUS AS NEEDED
Status: DISCONTINUED | OUTPATIENT
Start: 2024-12-10 | End: 2024-12-10

## 2024-12-10 RX ORDER — ACETAMINOPHEN 325 MG/1
975 TABLET ORAL EVERY 6 HOURS PRN
Status: DISCONTINUED | OUTPATIENT
Start: 2024-12-10 | End: 2024-12-10 | Stop reason: HOSPADM

## 2024-12-10 RX ORDER — IBUPROFEN 600 MG/1
600 TABLET, FILM COATED ORAL EVERY 6 HOURS PRN
Qty: 60 TABLET | Refills: 0 | Status: SHIPPED | OUTPATIENT
Start: 2024-12-10

## 2024-12-10 RX ORDER — PROPOFOL 10 MG/ML
INJECTION, EMULSION INTRAVENOUS AS NEEDED
Status: DISCONTINUED | OUTPATIENT
Start: 2024-12-10 | End: 2024-12-10

## 2024-12-10 RX ORDER — FENTANYL CITRATE/PF 50 MCG/ML
50 SYRINGE (ML) INJECTION
Status: DISCONTINUED | OUTPATIENT
Start: 2024-12-10 | End: 2024-12-10 | Stop reason: HOSPADM

## 2024-12-10 RX ORDER — SODIUM CHLORIDE, SODIUM LACTATE, POTASSIUM CHLORIDE, CALCIUM CHLORIDE 600; 310; 30; 20 MG/100ML; MG/100ML; MG/100ML; MG/100ML
INJECTION, SOLUTION INTRAVENOUS CONTINUOUS PRN
Status: DISCONTINUED | OUTPATIENT
Start: 2024-12-10 | End: 2024-12-10

## 2024-12-10 RX ORDER — HYDROMORPHONE HCL/PF 1 MG/ML
0.5 SYRINGE (ML) INJECTION
Status: DISCONTINUED | OUTPATIENT
Start: 2024-12-10 | End: 2024-12-10 | Stop reason: HOSPADM

## 2024-12-10 RX ADMIN — FENTANYL CITRATE 25 MCG: 50 INJECTION INTRAMUSCULAR; INTRAVENOUS at 18:15

## 2024-12-10 RX ADMIN — ROCURONIUM BROMIDE 50 MG: 10 INJECTION, SOLUTION INTRAVENOUS at 15:08

## 2024-12-10 RX ADMIN — PROPOFOL 200 MG: 10 INJECTION, EMULSION INTRAVENOUS at 15:08

## 2024-12-10 RX ADMIN — FENTANYL CITRATE 50 MCG: 50 INJECTION INTRAMUSCULAR; INTRAVENOUS at 18:20

## 2024-12-10 RX ADMIN — ROCURONIUM BROMIDE 10 MG: 10 INJECTION, SOLUTION INTRAVENOUS at 16:04

## 2024-12-10 RX ADMIN — SODIUM CHLORIDE, SODIUM LACTATE, POTASSIUM CHLORIDE, AND CALCIUM CHLORIDE 125 ML/HR: .6; .31; .03; .02 INJECTION, SOLUTION INTRAVENOUS at 11:44

## 2024-12-10 RX ADMIN — SUGAMMADEX 200 MG: 100 INJECTION, SOLUTION INTRAVENOUS at 17:26

## 2024-12-10 RX ADMIN — HYDROMORPHONE HYDROCHLORIDE 0.5 MG: 1 INJECTION, SOLUTION INTRAMUSCULAR; INTRAVENOUS; SUBCUTANEOUS at 18:35

## 2024-12-10 RX ADMIN — DEXAMETHASONE SODIUM PHOSPHATE 10 MG: 10 INJECTION, SOLUTION INTRAMUSCULAR; INTRAVENOUS at 15:09

## 2024-12-10 RX ADMIN — LIDOCAINE HYDROCHLORIDE 50 MG: 10 INJECTION, SOLUTION EPIDURAL; INFILTRATION; INTRACAUDAL; PERINEURAL at 15:08

## 2024-12-10 RX ADMIN — FENTANYL CITRATE 25 MCG: 50 INJECTION INTRAMUSCULAR; INTRAVENOUS at 18:10

## 2024-12-10 RX ADMIN — HYDROMORPHONE HYDROCHLORIDE 0.5 MG: 1 INJECTION, SOLUTION INTRAMUSCULAR; INTRAVENOUS; SUBCUTANEOUS at 16:21

## 2024-12-10 RX ADMIN — SODIUM CHLORIDE, SODIUM LACTATE, POTASSIUM CHLORIDE, AND CALCIUM CHLORIDE: .6; .31; .03; .02 INJECTION, SOLUTION INTRAVENOUS at 16:50

## 2024-12-10 RX ADMIN — FENTANYL CITRATE 100 MCG: 50 INJECTION INTRAMUSCULAR; INTRAVENOUS at 15:08

## 2024-12-10 RX ADMIN — HYDROMORPHONE HYDROCHLORIDE 0.5 MG: 1 INJECTION, SOLUTION INTRAMUSCULAR; INTRAVENOUS; SUBCUTANEOUS at 18:45

## 2024-12-10 RX ADMIN — KETOROLAC TROMETHAMINE 30 MG: 30 INJECTION, SOLUTION INTRAMUSCULAR; INTRAVENOUS at 17:31

## 2024-12-10 RX ADMIN — SODIUM CHLORIDE, SODIUM LACTATE, POTASSIUM CHLORIDE, AND CALCIUM CHLORIDE: .6; .31; .03; .02 INJECTION, SOLUTION INTRAVENOUS at 15:02

## 2024-12-10 RX ADMIN — MIDAZOLAM 2 MG: 1 INJECTION INTRAMUSCULAR; INTRAVENOUS at 15:00

## 2024-12-10 RX ADMIN — HYDROMORPHONE HYDROCHLORIDE 0.5 MG: 1 INJECTION, SOLUTION INTRAMUSCULAR; INTRAVENOUS; SUBCUTANEOUS at 18:30

## 2024-12-10 RX ADMIN — HYDROMORPHONE HYDROCHLORIDE 0.5 MG: 1 INJECTION, SOLUTION INTRAMUSCULAR; INTRAVENOUS; SUBCUTANEOUS at 17:35

## 2024-12-10 RX ADMIN — ONDANSETRON 4 MG: 2 INJECTION INTRAMUSCULAR; INTRAVENOUS at 17:16

## 2024-12-10 RX ADMIN — CEFAZOLIN SODIUM 2000 MG: 2 SOLUTION INTRAVENOUS at 15:08

## 2024-12-10 NOTE — ANESTHESIA POSTPROCEDURE EVALUATION
Post-Op Assessment Note    CV Status:  Stable  Pain Score: 3    Pain management: adequate       Mental Status:  Awake   Hydration Status:  Stable   PONV Controlled:  None   Airway Patency:  Patent     Post Op Vitals Reviewed: Yes    No anethesia notable event occurred.    Staff: Anesthesiologist, CRNA           Last Filed PACU Vitals:  Vitals Value Taken Time   Temp 99.2 °F (37.3 °C) 12/10/24 1737   Pulse 79 12/10/24 1739   /78 12/10/24 1737   Resp 16 12/10/24 1739   SpO2 100 % 12/10/24 1739   Vitals shown include unfiled device data.    Modified Swati:  Activity: 2 (12/10/2024  5:37 PM)  Respiration: 2 (12/10/2024  5:37 PM)  Circulation: 2 (12/10/2024  5:37 PM)  Consciousness: 1 (12/10/2024  5:37 PM)  Oxygen Saturation: 1 (12/10/2024  5:37 PM)  Modified Swati Score: 8 (12/10/2024  5:37 PM)

## 2024-12-10 NOTE — OP NOTE
OPERATIVE REPORT  PATIENT NAME: Bertha Rodríguez    :  1990  MRN: [id number]  Pt Location: [facility] OR ROOM 05    SURGERY DATE: 12/10/2024    Surgeons and Role:     * Liza Busby MD - Primary     * Bianca Rivers MD - Assisting     * Armida Gray MD - Assisting    Preop Diagnosis:  Menorrhagia with regular cycle [N92.0]  Adenomyosis [N80.03]    Post-Op Diagnosis Codes:     * Menorrhagia with regular cycle [N92.0]     * Adenomyosis [N80.03]    Procedure(s):  Bilateral - HYSTERECTOMY LAPAROSCOPIC ASSISTED VAGINAL (LAVH) WITH BILATERAL SALPINGECTOMY. EXAM UNDER ANESTHESIA  CYSTOSCOPY    Specimen(s):  * No specimens in log *    Estimated Blood Loss:   50mL    Operative Findings:  Normal appearing external female genitalia, vaginal mucosa and cervix  Small, anteverted, mobile uterus which sounded to 8 cm  On laparoscopic evaluation, grossly normal abdominal survey including a normal liver edge, and stomach without evidence of injury or gross pathology   Small omental adhesion on anterior abdominal wall just below the umbilicus  Uterus grossly normal with mild adhesions between bladder peritoneum and anterior serosa, consistent with patient's 2 prior  sections  Grossly normal appearing bilateral fallopian tubes and ovaries bilaterally with 2 simple, likely physiologic cysts noted within the left ovary  Transperitoneal course of both ureters identified with vermiculation noted  On cystoscopic evaluation, normal appearing, intact bladder lumen without evidence of injury or foreign body  Bilateral ureteral efflux appreciated     Procedure Details:  Patient was identified in the preoperative area as well as the operating suite. After successful induction of general endotracheal anesthesia patient was placed in the dorsal lithotomy position using yellowfin stirrups. All pressure points were padded and both a Jax hugger and SCDs were placed. She received Ancef 2g IV for preoperative prophylaxis. A time  out was performed and the above information was confirmed.      Exam under anesthesia revealed moderate uterine descensus with good mobility and no palpable adnexal masses. She was prepped and draped in the usual sterile fashion, using Chloraprep on the abdomen and chlorhexidine on the vagina and perineum. A Strauss catheter was aseptically inserted. The anterior lip of the cervix was grasped with a single tooth tenaculum and a Chiara uterine manipulator was placed without difficulty. Sterile gloves are changed and attention was drawn to the patient's abdomen.      Infraumbilical umbilical skin fold was infiltrated using 0.25% Marcaine. A 5 mm incision was then made for introduction of a 5mm trocar, which was done under direct visualization.  Intraperitoneal placement was confirmed and CO2 was used to establish pneumoperitoneum. Mid abdominal port sites were identified in the bilateral lower quadrants, 3cm superior and medial to the ASIS. Each site was infiltrated with 0.25% Marcaine and a 5 mm incision was made for placement of 5mm trocars, which was also done under direct visualization. The entire abdomen and pelvis were inspected and there was no evidence for injury to bowel, bladder, vasculature, or other structures. The above findings were noted.      Attention was drawn to the pelvis. The left fallopian tube was grasped at its fimbriated end and Enseal was used to ligate along the mesosalpinx, working proximally. Enseal was then used to cauterize and divide the left utero-ovarian and round ligaments. The same series of steps were then performed on the right. Good hemostasis was confirmed following this portion of the procedure.    A bladder flap was created with minimal thermal usage and the bladder was gently pushed down over the cup of the manipulator. The Enseal was then used to clamp, coagulate, and divide the uterine arteries    At this point, we proceeded vaginally. A weighted speculum was placed vaginally  the cervix was then grasped with double-tooth tenaculums.    Vasopressin was injected into the bilateral uterosacral ligaments and around the cervicovaginal junction.    The cervicovaginal junction was then incised using scalpel and bluntly dissected using a Ray-Mai. The posterior cul-de-sac was able to be identified and entered sharply. The peritoneum was then tacked to the posterior vaginal cuff using an 0 Vicryl suture.  Bilateral uterosacral ligaments were also tagged with 0 Vicryl suture for support later on in the case. The gooseneck weighted speculum was placed into the posterior cul-de-sac.  Using the clamp cut tie technique, bilateral paracervical and parametrial tissues were taken down with pedicles secured using 0 Vicryl. The anterior cul-de-sac was then further dissected sharply using the scissors and all remaining attachments to the uterus were divided and tied off.  Once there was noted to be no further attachment from the uterus to the parametria, the uterus was delivered vaginally.    The specimen, consisting of the uterus, cervix, and bilateral fallopian tubes, was then handed off and sent for routine pathology. The vaginal cuff was then reapproximated with figure-of-eight stitches with 0 Vicryl suture. Care was taken to incorporate the angles of the cuff to the uterosacral ligaments for prolapse support.    Sterile gloves were changed and attention was drawn to return to the laparoscopic portion. The pneumoperitoneum was reestablished to a maximum of 15mmHg. Pelvis (including surgical pedicles) and anterior abdominal wall were again inspected and were hemostatic. The pelvis was irrigated and the irrigation fluid was suctioned from the pelvis. Vaginal cuff appeared intact and hemostasis was excellent.  Pneumoperitoneum was allowed to escape. Laparoscope and trocars were removed. The skin incisions were closed with 4-0 Monocryl and exofin. The Strauss catheter was removed.    A cystoscopy was then  performed by filling the bladder retrograde with the suction  device and placement of the laparoscope.  There was noted to be a bubble at the top of the bladder and brisk jets were seen bilaterally from the ureteral orifices. The bladder was drained.    Metrogel was placed to the vagina to soothe and protect the vaginal epithelium and reduce abscess formation    At the conclusion of the procedure, all needle, sponge, and instrument counts were noted to be correct x2. Patient tolerated the procedure well and was extubated prior to leaving OR. She was transferred to PACU in stable condition. Dr. Busby was present and participated in all key portions of the procedure.         SIGNATURE: Bianca Rivers MD  DATE: December 10, 2024  TIME: 5:01 PM    Armida Gray MD  PGY 2, Obstetrics and Gynecology  12/10/2024  5:42 PM

## 2024-12-10 NOTE — DISCHARGE INSTR - AVS FIRST PAGE
Please take the following medications:   Alternate tylenol, motrin  Oxycodone (prescription provided) as needed every 4 hours for severe pain   If you are taking oxycodone, please take a stool softener or Miralax as needed for constipation    2. Activity restrictions:  No heavy lifting > 10 lbs.    Nothing in the vagina until your follow-up visit  No tub baths or swimming  Walking is encouraged. Take care when going up and down stairs.    3. You may shower. You can use soapy water surrounding the incision and let the water run over it. Pat the incision dry after your shower.     4. You have surgical glue over your incision which will dissolve on its own.     5. If you have heavy vaginal bleeding soaking through more than 1 pad per hour, fever with temperature > 100.4F or 38C, nausea/vomiting, severe abdominal pain, or notice redness or drainage from your incision, please call the office.     6. You have a follow-up appointment listed below.

## 2024-12-10 NOTE — INTERVAL H&P NOTE
H&P reviewed. After examining the patient I find no changes in the patients condition since the H&P had been written.    Vitals:    12/10/24 1121   BP: 135/85   Pulse: 74   Resp: 18   Temp: (!) 97.3 °F (36.3 °C)   SpO2: 100%

## 2024-12-10 NOTE — ANESTHESIA PREPROCEDURE EVALUATION
"Procedure:  HYSTERECTOMY LAPAROSCOPIC ASSISTED VAGINAL (LAVH) WITH BILATERAL SALPINGECTOMY, EXAM UNDER ANESTHESIA (Bilateral: Abdomen)  CYSTOSCOPY (Bladder)    Relevant Problems   NEURO/PSYCH   (+) Depression      Other   (+) Obesity     Per gyn H&P:Ultrasound consistent with adenomyosis. After reviewing options to treat patient desires definitive therapy.      Physical Exam    Airway    Mallampati score: II  TM Distance: >3 FB  Neck ROM: full     Dental       Cardiovascular      Pulmonary      Other Findings  post-pubertal.      Anesthesia Plan  ASA Score- 1     Anesthesia Type- general with ASA Monitors.         Additional Monitors:     Airway Plan: ETT.    Comment: Recent labs personally reviewed:  Lab Results       Component                Value               Date                       WBC                      5.14                11/22/2024                 HGB                      12.5                11/22/2024                 PLT                      254                 11/22/2024            Lab Results       Component                Value               Date                       NA                       140                 09/12/2015                 K                        4.1                 11/22/2024                 BUN                      12                  11/22/2024                 CREATININE               0.63                11/22/2024                 GLUCOSE                  78                  09/12/2015            No results found for: \"PTT\"   No results found for: \"INR\"    Blood type O      IVarsha MD, have personally seen and evaluated the patient prior to anesthetic care.  I have reviewed the pre-anesthetic record, medical history, allergies, medications and any other medical records if appropriate to the anesthetic care.  If a CRNA is involved in the case, I have reviewed the CRNA assessment, if present, and agree. I consented the patient for general anesthesia with appropriate airway " support as indicated. We reviewed the risks associated including PONV, sore throat, allergic reaction to anesthetics and management plan to address these issues. We discussed the indication and risks associated with any invasive monitors that would be placed. We discussed post op pain control and expectations. We discussed rare complications including hypoxia, perioperative cardiac and neurologic events, and death based on the patient's baseline risk. All questions and concerns were addressed.     .       Plan Factors-Exercise tolerance (METS): >4 METS.    Chart reviewed.   Existing labs reviewed. Patient summary reviewed.    Patient is not a current smoker.  Patient did not smoke on day of surgery.    Obstructive sleep apnea risk education given perioperatively.        Induction- intravenous.    Postoperative Plan-         Informed Consent- Anesthetic plan and risks discussed with patient.  I personally reviewed this patient with the CRNA. Discussed and agreed on the Anesthesia Plan with the CRNA..

## 2024-12-12 NOTE — ANESTHESIA POSTPROCEDURE EVALUATION
Post-Op Assessment Note    CV Status:  Stable    Pain management: adequate       Mental Status:  Alert and awake   Hydration Status:  Euvolemic   PONV Controlled:  Controlled   Airway Patency:  Patent     Post Op Vitals Reviewed: Yes    No anethesia notable event occurred.    Staff: Anesthesiologist           Last Filed PACU Vitals:  Vitals Value Taken Time   Temp 98.8 °F (37.1 °C) 12/10/24 1900   Pulse 72 12/10/24 1909   /76 12/10/24 1901   Resp 55 12/10/24 1909   SpO2 97 % 12/10/24 1909   Vitals shown include unfiled device data.    Modified Swati:  No data recorded

## 2024-12-20 ENCOUNTER — OFFICE VISIT (OUTPATIENT)
Dept: OBGYN CLINIC | Facility: CLINIC | Age: 34
End: 2024-12-20

## 2024-12-20 VITALS
HEIGHT: 72 IN | DIASTOLIC BLOOD PRESSURE: 76 MMHG | WEIGHT: 243 LBS | SYSTOLIC BLOOD PRESSURE: 120 MMHG | BODY MASS INDEX: 32.91 KG/M2

## 2024-12-20 DIAGNOSIS — Z90.710 S/P LAPAROSCOPIC ASSISTED VAGINAL HYSTERECTOMY (LAVH): Primary | ICD-10-CM

## 2024-12-20 PROCEDURE — 99024 POSTOP FOLLOW-UP VISIT: CPT | Performed by: OBSTETRICS & GYNECOLOGY

## 2024-12-20 NOTE — PROGRESS NOTES
Subjective     Bertha Rodríguez is a 34 y.o. G2, P2 female here for a postop visit.  Patient is about 10 days post LAVH bilateral salpingectomy.  Reviewed pathology and finding of adenomyosis.  Patient has been needing to take it somewhat slowly but steadily recovering increasing activity.  Patient is tolerating regular diet with appropriate appetite, voiding and bowel without difficulty though bowels are moving a little slower than usual.  A little bit of bleeding and discharge from the vaginal area minimal.  When patient is more active, took son to a birthday party and was standing for a while more pressure and discomfort and then had to rest for 2 days.  Reviewed surgical photos.     Gynecologic History  Patient's last menstrual period was 2024.  Contraception: status post hysterectomy  Last Pap: 2024. Results were: normal      Obstetric History  OB History    Para Term  AB Living   2 2 2   2   SAB IAB Ectopic Multiple Live Births      0 2      # Outcome Date GA Lbr Chan/2nd Weight Sex Type Anes PTL Lv   2 Term 19 37w6d  3527 g (7 lb 12.4 oz) M CS-LTranv Spinal  XAVIER   1 Term 09/12/15 39w0d  3090 g (6 lb 13 oz) F CS-LTranv EPI  XAVIER      Complications: Fetal Intolerance      Obstetric Comments   : 2 C/S         The following portions of the patient's history were reviewed and updated as appropriate: allergies, current medications, past family history, past medical history, past social history, past surgical history, and problem list.    Review of Systems  Review of Systems   Constitutional:  Negative for fatigue, fever and unexpected weight change.   HENT:  Negative for dental problem, sinus pressure and sinus pain.    Eyes:  Negative for visual disturbance.   Respiratory:  Negative for cough, shortness of breath and wheezing.    Cardiovascular:  Negative for chest pain and leg swelling.   Gastrointestinal:  Negative for blood in stool, constipation, diarrhea, nausea and vomiting.    Endocrine: Negative for cold intolerance, heat intolerance and polydipsia.   Genitourinary:  Positive for vaginal bleeding. Negative for dysuria, frequency, hematuria, menstrual problem and pelvic pain.   Musculoskeletal:  Negative for arthralgias and back pain.   Neurological:  Negative for dizziness, seizures and headaches.   Psychiatric/Behavioral:  The patient is not nervous/anxious.         Objective     /76 (BP Location: Right arm, Patient Position: Sitting)   Ht 6' (1.829 m)   Wt 110 kg (243 lb)   LMP 12/05/2024   BMI 32.96 kg/m²   General appearance: alert and oriented, in no acute distress  Head: Normocephalic, without obvious abnormality, atraumatic  Lungs: clear to auscultation bilaterally  Heart: regular rate and rhythm, S1, S2 normal, no murmur, click, rub or gallop  Abdomen: soft, non-tender; bowel sounds normal; no masses,  no organomegaly and incisions clean dry intact healing well no glue at umbilicus minimal glue and lower incisions reviewed using Vaseline and gently removing glue  Extremities: extremities normal, warm and well-perfused; no cyanosis, clubbing, or edema      Assessment  34-year-old G2, P2 10 days post LAVH bilateral salpingectomy steadily recovering.  Encouraged to increase activity slowly.     Plan  Return in 2 to 3 weeks for next postop

## 2025-01-09 ENCOUNTER — OFFICE VISIT (OUTPATIENT)
Dept: OBGYN CLINIC | Facility: CLINIC | Age: 35
End: 2025-01-09

## 2025-01-09 VITALS
WEIGHT: 250 LBS | DIASTOLIC BLOOD PRESSURE: 80 MMHG | HEIGHT: 72 IN | SYSTOLIC BLOOD PRESSURE: 134 MMHG | BODY MASS INDEX: 33.86 KG/M2

## 2025-01-09 DIAGNOSIS — Z90.710 S/P LAPAROSCOPIC ASSISTED VAGINAL HYSTERECTOMY (LAVH): Primary | ICD-10-CM

## 2025-01-09 PROCEDURE — 99024 POSTOP FOLLOW-UP VISIT: CPT | Performed by: OBSTETRICS & GYNECOLOGY

## 2025-01-09 NOTE — PROGRESS NOTES
Subjective     Bertha Rodríguez is a 34 y.o. G2, P2 female here for a postop visit.  Patient is about 4 weeks post LAVH bilateral salpingectomy.  Patient has generally been steadily improving however still noting some fatigue and muscular weakness.  Constipation is resolved and voiding without difficulty.  Tolerating regular diet.  Patient had journey to [place] to visit friends and generally rested during the trip but after returning she has noted some mid back strain.  We discussed that this may be because abdominal muscles are not as strong currently and not supporting her back reviewed Motrin and Tylenol and encouraged her to let me know if this is not recovering.  Patient reports no bleeding or discharge vaginally.  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  Discussed waiting till 8 weeks for that type of strain.     Gynecologic History  Patient's last menstrual period was 2024.  Contraception: status post hysterectomy  Last Pap: 2024. Results were: normal      Obstetric History  OB History    Para Term  AB Living   2 2 2   2   SAB IAB Ectopic Multiple Live Births      0 2      # Outcome Date GA Lbr Chan/2nd Weight Sex Type Anes PTL Lv   2 Term 19 37w6d  3527 g (7 lb 12.4 oz) M CS-LTranv Spinal  XAVIER   1 Term 09/12/15 39w0d  3090 g (6 lb 13 oz) F CS-LTranv EPI  XAVIER      Complications: Fetal Intolerance      Obstetric Comments   : 2 C/S         The following portions of the patient's history were reviewed and updated as appropriate: allergies, current medications, past family history, past medical history, past social history, past surgical history, and problem list.    Review of Systems  Review of Systems   Constitutional: Negative.  Negative for chills, fatigue, fever and unexpected weight change.   HENT: Negative.  Negative for dental problem, sinus pressure and sinus pain.     Eyes: Negative.  Negative for visual disturbance.   Respiratory: Negative.  Negative for cough, shortness of breath and wheezing.    Cardiovascular: Negative.  Negative for chest pain and leg swelling.   Gastrointestinal: Negative.  Negative for constipation, diarrhea, nausea and vomiting.   Genitourinary: Negative.  Negative for menstrual problem, pelvic pain and urgency.   Musculoskeletal:  Positive for back pain.   Allergic/Immunologic: Positive for environmental allergies.   Neurological: Negative.  Negative for dizziness and headaches.        Objective     /80 (BP Location: Left arm, Patient Position: Sitting, Cuff Size: Standard)   Ht 6' (1.829 m)   Wt 113 kg (250 lb)   LMP 12/05/2024   BMI 33.91 kg/m²   General appearance: alert and oriented, in no acute distress  Head: Normocephalic, without obvious abnormality, atraumatic  Lungs: clear to auscultation bilaterally  Heart: regular rate and rhythm, S1, S2 normal, no murmur, click, rub or gallop  Abdomen: soft, non-tender; bowel sounds normal; no masses,  no organomegaly and incisions clean dry intact healing well  Pelvic: external genitalia normal, vagina normal without discharge, no adnexal masses or tenderness, and cuff is nontender on palpation no collection and intact stitches are still visible  Extremities: extremities normal, warm and well-perfused; no cyanosis, clubbing, or edema      Assessment  34-year-old G2, P2 4 weeks post LAVH bilateral salpingectomy steadily recovering.     Plan  Plan for 8 weeks out due to work.  Must be able to return without lifting restrictions.  Return in 2 to 3 weeks for next postop.  Scheduled for annual in July

## 2025-01-22 ENCOUNTER — OFFICE VISIT (OUTPATIENT)
Dept: OBGYN CLINIC | Facility: CLINIC | Age: 35
End: 2025-01-22

## 2025-01-22 VITALS
HEIGHT: 72 IN | WEIGHT: 242 LBS | BODY MASS INDEX: 32.78 KG/M2 | SYSTOLIC BLOOD PRESSURE: 124 MMHG | DIASTOLIC BLOOD PRESSURE: 78 MMHG

## 2025-01-22 DIAGNOSIS — Z90.710 S/P LAPAROSCOPIC ASSISTED VAGINAL HYSTERECTOMY (LAVH): Primary | ICD-10-CM

## 2025-01-22 PROCEDURE — 99024 POSTOP FOLLOW-UP VISIT: CPT | Performed by: OBSTETRICS & GYNECOLOGY

## 2025-01-22 NOTE — PROGRESS NOTES
Subjective     Bertha Rodríguez is a 35 y.o. G2, P2 female here for a postop visit.  Patient is almost 7 weeks post LAVH bilateral salpingectomy with steady recovery.  Patient's energy is not yet normal she is slowly increasing activity and feels that her back pain and weakness has improved she plans to continue to increase activity so she is ready for full duty at work.  Due to patient's work situations needing to be ready for no restrictions at return she is out until 8 weeks postop.  Her energy is improving and her strength is improving.  Appetite and digestion are normal and appropriate, no bleeding or discharge vaginally, urine and bowels are normal.  We discussed the possibility of cycle symptoms that may be upcoming with her timing of cycle.     Gynecologic History  Patient's last menstrual period was 2024.  Contraception: status post hysterectomy  Last Pap: 2024. Results were: normal      Obstetric History  OB History    Para Term  AB Living   2 2 2   2   SAB IAB Ectopic Multiple Live Births      0 2      # Outcome Date GA Lbr Chan/2nd Weight Sex Type Anes PTL Lv   2 Term 19 37w6d  3527 g (7 lb 12.4 oz) M CS-LTranv Spinal  XAVIER   1 Term 09/12/15 39w0d  3090 g (6 lb 13 oz) F CS-LTranv EPI  XAVIER      Complications: Fetal Intolerance      Obstetric Comments   : 2 C/S         The following portions of the patient's history were reviewed and updated as appropriate: allergies, current medications, past family history, past medical history, past social history, past surgical history, and problem list.    Review of Systems  Review of Systems   Constitutional: Negative.  Negative for chills, fatigue, fever and unexpected weight change.   HENT: Negative.  Negative for dental problem, sinus pressure and sinus pain.    Eyes: Negative.  Negative for visual disturbance.   Respiratory: Negative.  Negative for cough, shortness of breath and wheezing.    Cardiovascular: Negative.  Negative for  chest pain and leg swelling.   Gastrointestinal: Negative.  Negative for constipation, diarrhea, nausea and vomiting.   Genitourinary: Negative.  Negative for menstrual problem, pelvic pain and urgency.   Musculoskeletal: Negative.  Negative for back pain.   Allergic/Immunologic: Positive for environmental allergies.   Neurological:  Positive for headaches. Negative for dizziness.        Objective     /78 (BP Location: Left arm, Patient Position: Sitting, Cuff Size: Standard)   Ht 6' (1.829 m)   Wt 110 kg (242 lb)   LMP 12/05/2024   BMI 32.82 kg/m²   General appearance: alert and oriented, in no acute distress  Head: Normocephalic, without obvious abnormality, atraumatic  Lungs: clear to auscultation bilaterally  Heart: regular rate and rhythm, S1, S2 normal, no murmur, click, rub or gallop  Abdomen: soft, non-tender; bowel sounds normal; no masses,  no organomegaly and incisions clean dry intact healing well still some irritation at umbilicus but no glue remaining  Pelvic: external genitalia normal, vagina normal without discharge, and no adnexal masses or tenderness  Extremities: extremities normal, warm and well-perfused; no cyanosis, clubbing, or edema      Assessment  35-year-old G2, P2 almost 7 weeks post LAVH BS steadily improving regaining energy and strength.     Plan  Return for annual next July or sooner as needed

## 2025-01-29 ENCOUNTER — TELEPHONE (OUTPATIENT)
Age: 35
End: 2025-01-29

## 2025-01-29 NOTE — TELEPHONE ENCOUNTER
Patient is requesting [employer] extension be faxed her her employer, [employer].  Fax # [phone].      Patient states she was approved for  eight weeks.  She returns to work on 2/4/25

## 2025-01-31 ENCOUNTER — TELEPHONE (OUTPATIENT)
Age: 35
End: 2025-01-31

## 2025-01-31 NOTE — TELEPHONE ENCOUNTER
Pt called requesting for the office to create a letter stating pt maternity leave is extended to 02/04/25 and may resume back to work with no restrictions as of that day. Please advise and place [facility]

## 2025-02-03 ENCOUNTER — TELEPHONE (OUTPATIENT)
Age: 35
End: 2025-02-03

## 2025-02-03 NOTE — TELEPHONE ENCOUNTER
Patient states she can not get the FMLA papers from her [employer] messages - she only gets 1 to 2 pages   Also patient needs the FMLA short term disability (16 pages ) re-faxed to [facility] [employer] [facility] at [phone] they never received them

## 2025-02-03 NOTE — TELEPHONE ENCOUNTER
Patient called again asking for the extension letter.  She needs it asap because return to work is tomorrow 2/4/25.  Could we get this done for her and let her know.

## 2025-07-10 NOTE — PROGRESS NOTES
Assessment & Plan   Problem List Items Addressed This Visit    None  Visit Diagnoses       Well woman exam    -  Primary      History of laparoscopic-assisted vaginal hysterectomy              Discussion    All questions have been answered to her satisfaction  RTO for APE or sooner if needed  Pap no longer indicated due to hysterectomy with benign pathology.       Subjective     HPI   Bertha Rodríguez is a 35 y.o. female who presents for annual well woman exam.     LMP -s/p hysterectomy-LAVH, BS due to heavy menses  Pt moving to [place]  She is getting , amicable.     No vulvar itch/burn; No vaginal itch/burn; No abn discharge or odor; No urinary sx - burning/pain/frequency/hematuria    No concerning breast masses, asymmetry, nipple discharge or bleeding, changes in skin of breast, or breast tenderness bilaterally    No abd/pelvic pain or HAs;     Pt denies new sexual partner. No STI concerns.  No issues with intercourse; She declines sti/hiv/hep testing; Feels safe at home  Current contraception: NA    (+) PCP for routine Bw/care;    Last Pap : 24 wnl neg HPV  History of abnormal Pap smear: h/o HPV in her early 20s, resolved      Review of Systems   Constitutional: Negative.    Respiratory: Negative.     Gastrointestinal: Negative.    Endocrine: Negative.    Genitourinary: Negative.        The following portions of the patient's history were reviewed and updated as appropriate: allergies, current medications, past family history, past medical history, past social history, past surgical history, and problem list.         OB History        2    Para   2    Term   2            AB        Living   2       SAB        IAB        Ectopic        Multiple   0    Live Births   2           Obstetric Comments   : 2 C/S             Past Medical History[1]    Past Surgical History[2]    Family History[3]    Social History     Socioeconomic History   • Marital status: /Civil Union     Spouse name:  Not on file   • Number of children: Not on file   • Years of education: Not on file   • Highest education level: Not on file   Occupational History   • Not on file   Tobacco Use   • Smoking status: Never   • Smokeless tobacco: Never   Vaping Use   • Vaping status: Never Used   Substance and Sexual Activity   • Alcohol use: Not Currently     Alcohol/week: 1.0 standard drink of alcohol     Types: 1 Glasses of wine per week     Comment: social   • Drug use: No   • Sexual activity: Yes     Partners: Male     Birth control/protection: Female Sterilization     Comment: LMP 11/7/24   Other Topics Concern   • Not on file   Social History Narrative    Feels safe at home     Social Drivers of Health     Financial Resource Strain: Not on file   Food Insecurity: Not on file   Transportation Needs: Not on file   Physical Activity: Not on file   Stress: Not on file   Social Connections: Not on file   Intimate Partner Violence: Not on file   Housing Stability: Not on file       Current Medications[4]    Allergies[5]    Objective   Vitals:    07/14/25 1422   BP: 120/74   BP Location: Left arm   Patient Position: Sitting   Cuff Size: Standard   Weight: 103 kg (226 lb)     Physical Exam  Vitals reviewed.   HENT:      Head: Normocephalic and atraumatic.     Cardiovascular:      Rate and Rhythm: Normal rate and regular rhythm.   Pulmonary:      Effort: Pulmonary effort is normal.      Breath sounds: Normal breath sounds.   Chest:   Breasts:     Breasts are symmetrical.      Right: No swelling, bleeding, inverted nipple, mass, nipple discharge, skin change or tenderness.      Left: No swelling, bleeding, inverted nipple, mass, nipple discharge, skin change or tenderness.   Abdominal:      General: Abdomen is flat. Bowel sounds are normal.      Palpations: Abdomen is soft.      Tenderness: There is no abdominal tenderness. There is no right CVA tenderness, left CVA tenderness or guarding.   Genitourinary:     General: Normal vulva.       Pubic Area: No rash.       Labia:         Right: No rash, tenderness, lesion or injury.         Left: No rash, tenderness, lesion or injury.       Urethra: No prolapse, urethral pain, urethral swelling or urethral lesion.      Vagina: Normal. No signs of injury and foreign body. No vaginal discharge or erythema.      Uterus: Absent.       Adnexa: Right adnexa normal and left adnexa normal.     Musculoskeletal:      Cervical back: Neck supple.   Lymphadenopathy:      Upper Body:      Right upper body: No axillary adenopathy.      Left upper body: No axillary adenopathy.     Skin:     General: Skin is warm and dry.     Neurological:      Mental Status: She is alert and oriented to person, place, and time.     Psychiatric:         Mood and Affect: Mood normal.         Behavior: Behavior normal.         Thought Content: Thought content normal.         Judgment: Judgment normal.         There are no Patient Instructions on file for this visit.           [1]  Past Medical History:  Diagnosis Date   • Abnormal Pap smear of cervix    • Abnormal placenta, antepartum     resolved 17   • Depression     post partum   • HPV (human papilloma virus) infection    • Maternal morbid obesity, antepartum (HCC)     resolved 17 obeity complicating pregnancy, child birth or puerperium, antepartum   • Varicella     varicella vaccine   [2]  Past Surgical History:  Procedure Laterality Date   •  SECTION  2015   • MS  DELIVERY ONLY N/A 2019    Procedure:  SECTION () REPEAT;  Surgeon: Sissy Pang MD;  Location: [facility];  Service: Obstetrics   • MS CYSTOURETHROSCOPY N/A 12/10/2024    Procedure: CYSTOSCOPY;  Surgeon: Liza Busby MD;  Location: [facility];  Service: Gynecology   • MS LAPS W/VAG HYSTERECT 250 GM/&RMVL TUBE&/OVARIES Bilateral 12/10/2024    Procedure: HYSTERECTOMY LAPAROSCOPIC ASSISTED VAGINAL (LAVH) WITH BILATERAL SALPINGECTOMY, EXAM UNDER ANESTHESIA;  Surgeon: Liza Busby  MD;  Location: [facility];  Service: Gynecology   • WISDOM TOOTH EXTRACTION N/A    [3]  Family History  Problem Relation Name Age of Onset   • No Known Problems Mother     • Hypertension Father Shahab blake    • Celiac disease Sister     • Stroke Maternal Grandfather Immanuel jamison         Had multiple mini strokes before being diagnosed   [4]    Current Outpatient Medications:   •  acetaminophen (TYLENOL) 650 mg CR tablet, Take 1 tablet (650 mg total) by mouth every 8 (eight) hours as needed for mild pain, Disp: 30 tablet, Rfl: 0  •  albuterol (PROVENTIL HFA,VENTOLIN HFA) 90 mcg/act inhaler, Inhale 2 puffs every 4 (four) hours as needed, Disp: , Rfl:   •  cetirizine (ZyrTEC) 10 mg tablet, Take 10 mg by mouth in the morning., Disp: , Rfl:   •  Emollient (Collagen) CREA, Apply topically, Disp: , Rfl:   •  ibuprofen (MOTRIN) 600 mg tablet, Take 1 tablet (600 mg total) by mouth every 6 (six) hours as needed for mild pain, Disp: 60 tablet, Rfl: 0  •  ibuprofen (MOTRIN) 600 mg tablet, Take 1 tablet (600 mg total) by mouth every 6 (six) hours as needed for moderate pain (cramping), Disp: 30 tablet, Rfl: 0  •  Multiple Vitamin (MULTIVITAMINS PO), Take by mouth, Disp: , Rfl:   •  Turmeric (QC TUMERIC COMPLEX PO), Take by mouth, Disp: , Rfl: [5]  Allergies  Allergen Reactions   • Doxycycline Hives

## 2025-07-14 ENCOUNTER — ANNUAL EXAM (OUTPATIENT)
Age: 35
End: 2025-07-14
Payer: COMMERCIAL

## 2025-07-14 VITALS — BODY MASS INDEX: 30.65 KG/M2 | DIASTOLIC BLOOD PRESSURE: 74 MMHG | WEIGHT: 226 LBS | SYSTOLIC BLOOD PRESSURE: 120 MMHG

## 2025-07-14 DIAGNOSIS — Z90.710 HISTORY OF LAPAROSCOPIC-ASSISTED VAGINAL HYSTERECTOMY: ICD-10-CM

## 2025-07-14 DIAGNOSIS — Z01.419 WELL WOMAN EXAM: Primary | ICD-10-CM

## 2025-07-14 PROCEDURE — S0612 ANNUAL GYNECOLOGICAL EXAMINA: HCPCS | Performed by: PHYSICIAN ASSISTANT

## (undated) DEVICE — GAUZE SPONGES,16 PLY: Brand: CURITY

## (undated) DEVICE — EXOFIN PRECISION PEN HIGH VISCOSITY TOPICAL SKIN ADHESIVE: Brand: EXOFIN PRECISION PEN, 1G

## (undated) DEVICE — TRAY FOLEY 16FR URIMETER SILICONE SURESTEP

## (undated) DEVICE — SUT VICRYL 0 CT-1 CR/8 27 IN JJ41G

## (undated) DEVICE — CHLORAPREP HI-LITE 26ML ORANGE

## (undated) DEVICE — SKIN MARKER DUAL TIP WITH RULER CAP, FLEXIBLE RULER AND LABELS: Brand: DEVON

## (undated) DEVICE — HYDROPHILIC WOUND DRESSING WITH ZINC PLUS VITAMINS A AND B6.: Brand: DERMAGRAN®-B

## (undated) DEVICE — UTERINE MANIPULATOR RUMI 6.7 X 8 CM

## (undated) DEVICE — 2, DISPOSABLE SUCTION/IRRIGATOR WITHOUT DISPOSABLE TIP: Brand: STRYKEFLOW

## (undated) DEVICE — TELFA NON-ADHERENT ABSORBENT DRESSING: Brand: TELFA

## (undated) DEVICE — SURGICEL 2 X 3

## (undated) DEVICE — PACK C-SECTION PBDS

## (undated) DEVICE — CHLORHEXIDINE 4PCT 4 OZ

## (undated) DEVICE — ADHESIVE SKIN HIGH VISCOSITY EXOFIN 1ML

## (undated) DEVICE — TROCAR: Brand: KII® SLEEVE

## (undated) DEVICE — GLOVE INDICATOR PI UNDERGLOVE SZ 6.5 BLUE

## (undated) DEVICE — SUT VICRYL 0 CT-1 27 IN J260H

## (undated) DEVICE — DRESSING TELFA 2 X 3 IN STRL

## (undated) DEVICE — TROCAR: Brand: KII FIOS FIRST ENTRY

## (undated) DEVICE — 1840 FOAM BLOCK NEEDLE COUNTER: Brand: DEVON

## (undated) DEVICE — SUT VICRYL 4-0 PS-2 27 IN J426H

## (undated) DEVICE — Device

## (undated) DEVICE — DRAPE SHEET THREE QUARTER

## (undated) DEVICE — GLOVE SRG BIOGEL ECLIPSE 6.5

## (undated) DEVICE — INTENDED FOR TISSUE SEPARATION, AND OTHER PROCEDURES THAT REQUIRE A SHARP SURGICAL BLADE TO PUNCTURE OR CUT.: Brand: BARD-PARKER SAFETY BLADES SIZE 11, STERILE

## (undated) DEVICE — ABDOMINAL PAD: Brand: DERMACEA

## (undated) DEVICE — INTENDED FOR TISSUE SEPARATION, AND OTHER PROCEDURES THAT REQUIRE A SHARP SURGICAL BLADE TO PUNCTURE OR CUT.: Brand: BARD-PARKER SAFETY BLADES SIZE 15, STERILE

## (undated) DEVICE — MEDI-VAC YANK SUCT HNDL W/TPRD BULBOUS TIP: Brand: CARDINAL HEALTH

## (undated) DEVICE — GLOVE PI ULTRA TOUCH SZ.7.0

## (undated) DEVICE — NEEDLE SPINAL18G X 3.5 IN QUINCKE

## (undated) DEVICE — GLOVE INDICATOR PI UNDERGLOVE SZ 7 BLUE

## (undated) DEVICE — ENSEAL X1 TISSUE SEALER, CURVED JAW, 37 CM SHAFT LENGTH: Brand: ENSEAL

## (undated) DEVICE — PREMIUM DRY TRAY LF: Brand: MEDLINE INDUSTRIES, INC.

## (undated) DEVICE — BETHLEHEM UNIVERSAL GYN LAP PK: Brand: CARDINAL HEALTH

## (undated) DEVICE — SUT MONOCRYL 0 36 IN UNDYED Y946H

## (undated) DEVICE — MAYO STAND COVER: Brand: CONVERTORS

## (undated) DEVICE — TUBING SMOKE EVAC W/FILTRATION DEVICE PLUMEPORT ACTIV

## (undated) DEVICE — ALL PURPOSE SPONGES,NONWOVEN, 4 PLY: Brand: CURITY

## (undated) DEVICE — TUBING SUCTION 5MM X 12 FT